# Patient Record
Sex: MALE | Race: BLACK OR AFRICAN AMERICAN | Employment: OTHER
[De-identification: names, ages, dates, MRNs, and addresses within clinical notes are randomized per-mention and may not be internally consistent; named-entity substitution may affect disease eponyms.]

---

## 2022-03-18 PROBLEM — R42 POSTURAL DIZZINESS WITH PRESYNCOPE: Status: ACTIVE | Noted: 2020-01-04

## 2022-03-18 PROBLEM — E78.5 DYSLIPIDEMIA: Status: ACTIVE | Noted: 2018-09-12

## 2022-03-18 PROBLEM — N17.9 AKI (ACUTE KIDNEY INJURY) (HCC): Status: ACTIVE | Noted: 2020-01-04

## 2022-03-18 PROBLEM — I95.1 ORTHOSTATIC HYPOTENSION: Status: ACTIVE | Noted: 2020-01-04

## 2022-03-18 PROBLEM — Z71.89 ACP (ADVANCE CARE PLANNING): Status: ACTIVE | Noted: 2018-09-27

## 2022-03-18 PROBLEM — R55 POSTURAL DIZZINESS WITH PRESYNCOPE: Status: ACTIVE | Noted: 2020-01-04

## 2022-03-18 PROBLEM — F39 MOOD DISORDER (HCC): Status: ACTIVE | Noted: 2018-10-29

## 2022-03-19 PROBLEM — I50.32 CHF (CONGESTIVE HEART FAILURE), NYHA CLASS III, CHRONIC, DIASTOLIC (HCC): Status: ACTIVE | Noted: 2020-01-29

## 2022-03-19 PROBLEM — I25.10 CORONARY ARTERY DISEASE INVOLVING NATIVE HEART: Status: ACTIVE | Noted: 2018-09-12

## 2022-03-19 PROBLEM — J44.9 CHRONIC OBSTRUCTIVE PULMONARY DISEASE (HCC): Status: ACTIVE | Noted: 2018-09-12

## 2022-03-19 PROBLEM — I48.92 ATRIAL FLUTTER (HCC): Status: ACTIVE | Noted: 2018-10-04

## 2022-03-19 PROBLEM — R06.09 DYSPNEA ON EXERTION: Status: ACTIVE | Noted: 2020-01-10

## 2022-03-19 PROBLEM — I25.708 CORONARY ARTERY DISEASE OF BYPASS GRAFT WITH STABLE ANGINA PECTORIS (HCC): Status: ACTIVE | Noted: 2018-10-04

## 2022-03-19 PROBLEM — E11.21 TYPE 2 DIABETES WITH NEPHROPATHY (HCC): Status: ACTIVE | Noted: 2018-10-29

## 2022-03-19 PROBLEM — I50.9 CONGESTIVE HEART FAILURE (HCC): Status: ACTIVE | Noted: 2020-03-21

## 2022-03-19 PROBLEM — E11.65 TYPE 2 DIABETES MELLITUS WITH HYPERGLYCEMIA, WITHOUT LONG-TERM CURRENT USE OF INSULIN (HCC): Status: ACTIVE | Noted: 2018-09-12

## 2022-03-20 PROBLEM — I20.89 STABLE ANGINA: Status: ACTIVE | Noted: 2020-01-10

## 2022-03-20 PROBLEM — I10 ESSENTIAL HYPERTENSION: Status: ACTIVE | Noted: 2018-09-12

## 2022-03-20 PROBLEM — I50.32 CHRONIC DIASTOLIC CONGESTIVE HEART FAILURE (HCC): Status: ACTIVE | Noted: 2018-10-04

## 2022-03-20 PROBLEM — Z95.1 S/P CABG X 4: Status: ACTIVE | Noted: 2018-09-12

## 2023-01-23 PROBLEM — I77.819 AORTIC DILATATION (HCC): Status: ACTIVE | Noted: 2023-01-23

## 2023-01-23 PROBLEM — N18.30 CHRONIC RENAL DISEASE, STAGE III (HCC): Status: ACTIVE | Noted: 2023-01-23

## 2023-06-10 ENCOUNTER — APPOINTMENT (OUTPATIENT)
Facility: HOSPITAL | Age: 83
End: 2023-06-10
Payer: MEDICARE

## 2023-06-10 ENCOUNTER — HOSPITAL ENCOUNTER (EMERGENCY)
Facility: HOSPITAL | Age: 83
Discharge: HOME | End: 2023-06-10
Attending: EMERGENCY MEDICINE
Payer: MEDICARE

## 2023-06-10 VITALS
TEMPERATURE: 99 F | HEIGHT: 70 IN | BODY MASS INDEX: 35.79 KG/M2 | HEART RATE: 76 BPM | WEIGHT: 250 LBS | OXYGEN SATURATION: 93 % | DIASTOLIC BLOOD PRESSURE: 61 MMHG | RESPIRATION RATE: 18 BRPM | SYSTOLIC BLOOD PRESSURE: 118 MMHG

## 2023-06-10 DIAGNOSIS — M71.22 SYNOVIAL CYST OF LEFT POPLITEAL SPACE: ICD-10-CM

## 2023-06-10 DIAGNOSIS — M25.562 ACUTE PAIN OF LEFT KNEE: Primary | ICD-10-CM

## 2023-06-10 PROCEDURE — 73562 X-RAY EXAM OF KNEE 3: CPT

## 2023-06-10 PROCEDURE — 73610 X-RAY EXAM OF ANKLE: CPT

## 2023-06-10 PROCEDURE — 6360000002 HC RX W HCPCS: Performed by: EMERGENCY MEDICINE

## 2023-06-10 PROCEDURE — 73502 X-RAY EXAM HIP UNI 2-3 VIEWS: CPT

## 2023-06-10 PROCEDURE — 73700 CT LOWER EXTREMITY W/O DYE: CPT

## 2023-06-10 PROCEDURE — 6370000000 HC RX 637 (ALT 250 FOR IP): Performed by: EMERGENCY MEDICINE

## 2023-06-10 RX ORDER — KETOROLAC TROMETHAMINE 15 MG/ML
30 INJECTION, SOLUTION INTRAMUSCULAR; INTRAVENOUS
Status: COMPLETED | OUTPATIENT
Start: 2023-06-10 | End: 2023-06-10

## 2023-06-10 RX ORDER — HYDROCODONE BITARTRATE AND ACETAMINOPHEN 5; 325 MG/1; MG/1
1 TABLET ORAL
Status: COMPLETED | OUTPATIENT
Start: 2023-06-10 | End: 2023-06-10

## 2023-06-10 RX ADMIN — KETOROLAC TROMETHAMINE 30 MG: 15 INJECTION, SOLUTION INTRAMUSCULAR; INTRAVENOUS at 10:57

## 2023-06-10 RX ADMIN — HYDROCODONE BITARTRATE AND ACETAMINOPHEN 1 TABLET: 5; 325 TABLET ORAL at 13:29

## 2023-06-10 ASSESSMENT — PAIN SCALES - GENERAL: PAINLEVEL_OUTOF10: 5

## 2023-06-10 ASSESSMENT — LIFESTYLE VARIABLES
HOW OFTEN DO YOU HAVE A DRINK CONTAINING ALCOHOL: MONTHLY OR LESS
HOW MANY STANDARD DRINKS CONTAINING ALCOHOL DO YOU HAVE ON A TYPICAL DAY: 1 OR 2

## 2023-06-10 ASSESSMENT — PAIN - FUNCTIONAL ASSESSMENT: PAIN_FUNCTIONAL_ASSESSMENT: 0-10

## 2023-06-10 NOTE — ED NOTES
CT left knee completed. Pt sitting on edge of bed.  Waiting for results     Anna Vences RN  06/10/23 1400

## 2023-06-10 NOTE — ED NOTES
Attempted to ambulate the pt.  Pt stood and stated he could not take a step or bear weight on his left leg due to knee pain     Carli Yousif RN  06/10/23 8829

## 2023-06-10 NOTE — ED PROVIDER NOTES
sulfate HFA (PROVENTIL;VENTOLIN;PROAIR) 108 (90 Base) MCG/ACT inhaler Inhale 1 puff into the lungs every 4 hours as needed      ammonium lactate (LAC-HYDRIN) 12 % lotion Apply 12 Bottles topically 2 times daily as needed      apixaban (ELIQUIS) 5 MG TABS tablet Take 0.5 tablets by mouth 2 times daily      aspirin 81 MG EC tablet Take 1 tablet by mouth daily      vitamin D (CHOLECALCIFEROL) 25 MCG (1000 UT) TABS tablet Take 1 tablet by mouth 2 times daily      docusate (COLACE, DULCOLAX) 100 MG CAPS Take 100 mg by mouth daily      ferrous sulfate (FE TABS 325) 325 (65 Fe) MG EC tablet Take 1 tablet by mouth daily      fluticasone-umeclidin-vilant (TRELEGY ELLIPTA) 200-62.5-25 MCG/ACT AEPB inhaler Inhale 1 puff into the lungs daily      furosemide (LASIX) 40 MG tablet Take 1 tablet by mouth daily      metOLazone (ZAROXOLYN) 5 MG tablet Take 1 tablet by mouth daily      nitroGLYCERIN (NITROSTAT) 0.3 MG SL tablet Place 1 tablet under the tongue      pantoprazole (PROTONIX) 40 MG tablet Take 1 tablet by mouth daily      potassium chloride (KLOR-CON M) 20 MEQ extended release tablet Take 1 tablet by mouth 2 times daily      pravastatin (PRAVACHOL) 20 MG tablet Take 1 tablet by mouth      sertraline (ZOLOFT) 100 MG tablet Take 1.5 tablets by mouth daily      Magnesium 500 MG TABS Take 500 mg by mouth daily 90 tablet 1       ALLERGIES:  Allergies   Allergen Reactions    Penicillin G Swelling       SOCIAL DETERMINANTS OF HEALTH:  Social Determinants of Health     Tobacco Use: Low Risk     Smoking Tobacco Use: Never    Smokeless Tobacco Use: Never    Passive Exposure: Not on file   Alcohol Use: Not on file   Financial Resource Strain: Low Risk     Difficulty of Paying Living Expenses: Not very hard   Food Insecurity: No Food Insecurity    Worried About Running Out of Food in the Last Year: Never true    Ran Out of Food in the Last Year: Never true   Transportation Needs: Unknown    Lack of Transportation (Medical):  Not on

## 2023-06-10 NOTE — ED NOTES
Patient poor historian, wife not at bedside.    Triage- home medications pending per her arrival.      Jazmyn Sales RN  06/10/23 9902

## 2023-06-26 ENCOUNTER — OFFICE VISIT (OUTPATIENT)
Age: 83
End: 2023-06-26

## 2023-06-26 ENCOUNTER — TELEPHONE (OUTPATIENT)
Dept: SLEEP MEDICINE | Facility: HOSPITAL | Age: 83
End: 2023-06-26

## 2023-06-26 VITALS
WEIGHT: 235.2 LBS | HEART RATE: 68 BPM | DIASTOLIC BLOOD PRESSURE: 71 MMHG | TEMPERATURE: 98.2 F | OXYGEN SATURATION: 94 % | HEIGHT: 70 IN | RESPIRATION RATE: 18 BRPM | BODY MASS INDEX: 33.67 KG/M2 | SYSTOLIC BLOOD PRESSURE: 140 MMHG

## 2023-06-26 DIAGNOSIS — G47.33 OSA AND COPD OVERLAP SYNDROME (HCC): Primary | ICD-10-CM

## 2023-06-26 DIAGNOSIS — J44.9 OSA AND COPD OVERLAP SYNDROME (HCC): Primary | ICD-10-CM

## 2023-06-26 DIAGNOSIS — E66.9 OBESITY (BMI 30-39.9): ICD-10-CM

## 2023-06-26 DIAGNOSIS — I50.32 CHRONIC DIASTOLIC CONGESTIVE HEART FAILURE (HCC): ICD-10-CM

## 2023-06-26 DIAGNOSIS — I10 HYPERTENSION, UNSPECIFIED TYPE: ICD-10-CM

## 2023-06-26 DIAGNOSIS — I27.20 PULMONARY HYPERTENSION, UNSPECIFIED (HCC): ICD-10-CM

## 2023-06-26 ASSESSMENT — SLEEP AND FATIGUE QUESTIONNAIRES
HOW LIKELY ARE YOU TO NOD OFF OR FALL ASLEEP WHILE SITTING INACTIVE IN A PUBLIC PLACE: 2
ESS TOTAL SCORE: 16
HOW LIKELY ARE YOU TO NOD OFF OR FALL ASLEEP WHILE WATCHING TV: 3
HOW LIKELY ARE YOU TO NOD OFF OR FALL ASLEEP WHEN YOU ARE A PASSENGER IN A CAR FOR AN HOUR WITHOUT A BREAK: 2
HOW LIKELY ARE YOU TO NOD OFF OR FALL ASLEEP WHILE SITTING AND READING: 1
HOW LIKELY ARE YOU TO NOD OFF OR FALL ASLEEP IN A CAR, WHILE STOPPED FOR A FEW MINUTES IN TRAFFIC: 0
HOW LIKELY ARE YOU TO NOD OFF OR FALL ASLEEP WHILE LYING DOWN TO REST IN THE AFTERNOON WHEN CIRCUMSTANCES PERMIT: 3
NECK CIRCUMFERENCE (INCHES): 16
HOW LIKELY ARE YOU TO NOD OFF OR FALL ASLEEP WHILE SITTING AND TALKING TO SOMEONE: 2
HOW LIKELY ARE YOU TO NOD OFF OR FALL ASLEEP WHILE SITTING QUIETLY AFTER LUNCH WITHOUT ALCOHOL: 3

## 2023-06-26 ASSESSMENT — PATIENT HEALTH QUESTIONNAIRE - PHQ9
SUM OF ALL RESPONSES TO PHQ QUESTIONS 1-9: 0
SUM OF ALL RESPONSES TO PHQ9 QUESTIONS 1 & 2: 0
2. FEELING DOWN, DEPRESSED OR HOPELESS: 0
SUM OF ALL RESPONSES TO PHQ QUESTIONS 1-9: 0
1. LITTLE INTEREST OR PLEASURE IN DOING THINGS: 0
SUM OF ALL RESPONSES TO PHQ QUESTIONS 1-9: 0
SUM OF ALL RESPONSES TO PHQ QUESTIONS 1-9: 0

## 2023-06-27 ENCOUNTER — OFFICE VISIT (OUTPATIENT)
Facility: CLINIC | Age: 83
End: 2023-06-27
Payer: MEDICARE

## 2023-06-27 VITALS
SYSTOLIC BLOOD PRESSURE: 171 MMHG | HEART RATE: 94 BPM | DIASTOLIC BLOOD PRESSURE: 97 MMHG | WEIGHT: 234 LBS | TEMPERATURE: 97.8 F | BODY MASS INDEX: 34.66 KG/M2 | RESPIRATION RATE: 20 BRPM | HEIGHT: 69 IN | OXYGEN SATURATION: 91 %

## 2023-06-27 DIAGNOSIS — I25.708 ATHEROSCLEROSIS OF CORONARY ARTERY BYPASS GRAFT(S), UNSPECIFIED, WITH OTHER FORMS OF ANGINA PECTORIS (HCC): ICD-10-CM

## 2023-06-27 DIAGNOSIS — E78.5 HYPERLIPIDEMIA, UNSPECIFIED HYPERLIPIDEMIA TYPE: ICD-10-CM

## 2023-06-27 DIAGNOSIS — R26.89 POOR BALANCE: ICD-10-CM

## 2023-06-27 DIAGNOSIS — Z74.09 MOBILITY IMPAIRED: ICD-10-CM

## 2023-06-27 DIAGNOSIS — I50.32 CHRONIC DIASTOLIC (CONGESTIVE) HEART FAILURE (HCC): Primary | ICD-10-CM

## 2023-06-27 DIAGNOSIS — I10 ESSENTIAL (PRIMARY) HYPERTENSION: ICD-10-CM

## 2023-06-27 DIAGNOSIS — K59.00 CONSTIPATION, UNSPECIFIED CONSTIPATION TYPE: ICD-10-CM

## 2023-06-27 DIAGNOSIS — N18.30 STAGE 3 CHRONIC KIDNEY DISEASE, UNSPECIFIED WHETHER STAGE 3A OR 3B CKD (HCC): ICD-10-CM

## 2023-06-27 DIAGNOSIS — J44.9 CHRONIC OBSTRUCTIVE PULMONARY DISEASE, UNSPECIFIED COPD TYPE (HCC): ICD-10-CM

## 2023-06-27 DIAGNOSIS — E87.6 HYPOKALEMIA: ICD-10-CM

## 2023-06-27 DIAGNOSIS — Z91.81 AT HIGH RISK FOR FALLS: ICD-10-CM

## 2023-06-27 PROBLEM — M54.50 LOW BACK PAIN: Status: ACTIVE | Noted: 2023-06-27

## 2023-06-27 PROBLEM — H93.19 TINNITUS: Status: ACTIVE | Noted: 2023-06-27

## 2023-06-27 PROBLEM — J98.4 RESTRICTIVE LUNG DISEASE: Status: ACTIVE | Noted: 2023-06-27

## 2023-06-27 PROBLEM — K57.30 DIVERTICULAR DISEASE OF COLON: Status: ACTIVE | Noted: 2023-06-27

## 2023-06-27 PROBLEM — K70.0 ALCOHOLIC FATTY LIVER: Status: ACTIVE | Noted: 2023-06-27

## 2023-06-27 PROBLEM — D13.1 BENIGN NEOPLASM OF STOMACH: Status: ACTIVE | Noted: 2023-06-27

## 2023-06-27 PROBLEM — N18.2 BENIGN HYPERTENSIVE HEART AND KIDNEY DISEASE WITH CHRONIC KIDNEY DISEASE, STAGE II: Status: ACTIVE | Noted: 2023-06-27

## 2023-06-27 PROBLEM — D12.6 BENIGN NEOPLASM OF COLON: Status: ACTIVE | Noted: 2023-06-27

## 2023-06-27 PROBLEM — F43.10 POSTTRAUMATIC STRESS DISORDER: Status: ACTIVE | Noted: 2023-06-27

## 2023-06-27 PROBLEM — I25.2 OLD MYOCARDIAL INFARCTION: Status: ACTIVE | Noted: 2023-06-27

## 2023-06-27 PROBLEM — H90.3 ASYMMETRICAL SENSORINEURAL HEARING LOSS: Status: ACTIVE | Noted: 2023-06-27

## 2023-06-27 PROBLEM — E78.00 HYPERCHOLESTEROLEMIA: Status: ACTIVE | Noted: 2023-06-27

## 2023-06-27 PROBLEM — H25.019 CORTICAL SENILE CATARACT: Status: ACTIVE | Noted: 2023-06-27

## 2023-06-27 PROBLEM — H11.30 CONJUNCTIVAL HEMORRHAGE: Status: ACTIVE | Noted: 2023-06-27

## 2023-06-27 PROBLEM — I67.9 CEREBROVASCULAR DISEASE: Status: ACTIVE | Noted: 2023-06-27

## 2023-06-27 PROBLEM — E66.01 MORBID OBESITY (HCC): Status: ACTIVE | Noted: 2020-01-04

## 2023-06-27 PROBLEM — G47.33 OBSTRUCTIVE SLEEP APNEA SYNDROME: Status: ACTIVE | Noted: 2023-06-27

## 2023-06-27 PROBLEM — I13.10 BENIGN HYPERTENSIVE HEART AND KIDNEY DISEASE WITH CHRONIC KIDNEY DISEASE, STAGE II: Status: ACTIVE | Noted: 2023-06-27

## 2023-06-27 PROBLEM — B35.1 ONYCHOMYCOSIS: Status: ACTIVE | Noted: 2023-06-27

## 2023-06-27 PROBLEM — H04.129 TEAR FILM INSUFFICIENCY: Status: ACTIVE | Noted: 2023-06-27

## 2023-06-27 PROBLEM — R69 OTHER ILL-DEFINED AND UNKNOWN CAUSES OF MORBIDITY AND MORTALITY: Status: ACTIVE | Noted: 2023-06-27

## 2023-06-27 PROBLEM — K21.9 GASTROESOPHAGEAL REFLUX DISEASE: Status: ACTIVE | Noted: 2023-06-27

## 2023-06-27 PROBLEM — H52.4 PRESBYOPIA: Status: ACTIVE | Noted: 2023-06-27

## 2023-06-27 PROCEDURE — 3023F SPIROM DOC REV: CPT | Performed by: FAMILY MEDICINE

## 2023-06-27 PROCEDURE — 3074F SYST BP LT 130 MM HG: CPT | Performed by: FAMILY MEDICINE

## 2023-06-27 PROCEDURE — G8417 CALC BMI ABV UP PARAM F/U: HCPCS | Performed by: FAMILY MEDICINE

## 2023-06-27 PROCEDURE — G8427 DOCREV CUR MEDS BY ELIG CLIN: HCPCS | Performed by: FAMILY MEDICINE

## 2023-06-27 PROCEDURE — 1123F ACP DISCUSS/DSCN MKR DOCD: CPT | Performed by: FAMILY MEDICINE

## 2023-06-27 PROCEDURE — 3078F DIAST BP <80 MM HG: CPT | Performed by: FAMILY MEDICINE

## 2023-06-27 PROCEDURE — 1036F TOBACCO NON-USER: CPT | Performed by: FAMILY MEDICINE

## 2023-06-27 PROCEDURE — 99215 OFFICE O/P EST HI 40 MIN: CPT | Performed by: FAMILY MEDICINE

## 2023-06-27 RX ORDER — PRAVASTATIN SODIUM 20 MG
20 TABLET ORAL DAILY
Qty: 90 TABLET | Refills: 1 | Status: SHIPPED | OUTPATIENT
Start: 2023-06-27

## 2023-06-27 RX ORDER — POTASSIUM CHLORIDE 20 MEQ/1
20 TABLET, EXTENDED RELEASE ORAL 2 TIMES DAILY
Qty: 180 TABLET | Refills: 1 | Status: SHIPPED | OUTPATIENT
Start: 2023-06-27

## 2023-06-27 RX ORDER — PSEUDOEPHEDRINE HCL 30 MG
100 TABLET ORAL DAILY PRN
Qty: 90 CAPSULE | Refills: 1 | Status: SHIPPED | OUTPATIENT
Start: 2023-06-27

## 2023-06-27 SDOH — ECONOMIC STABILITY: INCOME INSECURITY: HOW HARD IS IT FOR YOU TO PAY FOR THE VERY BASICS LIKE FOOD, HOUSING, MEDICAL CARE, AND HEATING?: NOT VERY HARD

## 2023-06-27 SDOH — ECONOMIC STABILITY: HOUSING INSECURITY
IN THE LAST 12 MONTHS, WAS THERE A TIME WHEN YOU DID NOT HAVE A STEADY PLACE TO SLEEP OR SLEPT IN A SHELTER (INCLUDING NOW)?: NO

## 2023-06-27 SDOH — ECONOMIC STABILITY: FOOD INSECURITY: WITHIN THE PAST 12 MONTHS, YOU WORRIED THAT YOUR FOOD WOULD RUN OUT BEFORE YOU GOT MONEY TO BUY MORE.: NEVER TRUE

## 2023-06-27 SDOH — ECONOMIC STABILITY: FOOD INSECURITY: WITHIN THE PAST 12 MONTHS, THE FOOD YOU BOUGHT JUST DIDN'T LAST AND YOU DIDN'T HAVE MONEY TO GET MORE.: NEVER TRUE

## 2023-06-27 ASSESSMENT — PATIENT HEALTH QUESTIONNAIRE - PHQ9
SUM OF ALL RESPONSES TO PHQ QUESTIONS 1-9: 0
SUM OF ALL RESPONSES TO PHQ QUESTIONS 1-9: 0
1. LITTLE INTEREST OR PLEASURE IN DOING THINGS: 0
2. FEELING DOWN, DEPRESSED OR HOPELESS: 0
SUM OF ALL RESPONSES TO PHQ QUESTIONS 1-9: 0
SUM OF ALL RESPONSES TO PHQ QUESTIONS 1-9: 0
SUM OF ALL RESPONSES TO PHQ9 QUESTIONS 1 & 2: 0

## 2023-06-30 RX ORDER — AMLODIPINE BESYLATE 10 MG/1
10 TABLET ORAL DAILY
Qty: 30 TABLET | Refills: 5 | Status: SHIPPED | OUTPATIENT
Start: 2023-06-30

## 2023-07-05 ENCOUNTER — TELEPHONE (OUTPATIENT)
Facility: CLINIC | Age: 83
End: 2023-07-05

## 2023-07-05 NOTE — TELEPHONE ENCOUNTER
----- Message from Patrizia Kern MD sent at 6/30/2023  2:56 PM EDT -----  Please let patient know his blood pressure is elevated. I will send in amlodipine to take 10 mg daily. I will follow-up at next visit.   Patrizia Kern MD      Message left on voicemail

## 2023-07-10 ENCOUNTER — TELEPHONE (OUTPATIENT)
Age: 83
End: 2023-07-10

## 2023-07-10 ENCOUNTER — OFFICE VISIT (OUTPATIENT)
Age: 83
End: 2023-07-10
Payer: MEDICARE

## 2023-07-10 VITALS
DIASTOLIC BLOOD PRESSURE: 80 MMHG | RESPIRATION RATE: 18 BRPM | BODY MASS INDEX: 35.43 KG/M2 | HEIGHT: 69 IN | OXYGEN SATURATION: 93 % | WEIGHT: 239.2 LBS | SYSTOLIC BLOOD PRESSURE: 133 MMHG | TEMPERATURE: 97.9 F | HEART RATE: 94 BPM

## 2023-07-10 DIAGNOSIS — I27.20 PULMONARY HYPERTENSION, UNSPECIFIED (HCC): ICD-10-CM

## 2023-07-10 DIAGNOSIS — R53.81 PHYSICAL DECONDITIONING: ICD-10-CM

## 2023-07-10 DIAGNOSIS — B37.0 THRUSH: ICD-10-CM

## 2023-07-10 DIAGNOSIS — J43.2 CENTRILOBULAR EMPHYSEMA (HCC): Primary | ICD-10-CM

## 2023-07-10 DIAGNOSIS — M79.89 LEG SWELLING: ICD-10-CM

## 2023-07-10 PROCEDURE — 3074F SYST BP LT 130 MM HG: CPT | Performed by: INTERNAL MEDICINE

## 2023-07-10 PROCEDURE — G8417 CALC BMI ABV UP PARAM F/U: HCPCS | Performed by: INTERNAL MEDICINE

## 2023-07-10 PROCEDURE — 99213 OFFICE O/P EST LOW 20 MIN: CPT | Performed by: INTERNAL MEDICINE

## 2023-07-10 PROCEDURE — 3023F SPIROM DOC REV: CPT | Performed by: INTERNAL MEDICINE

## 2023-07-10 PROCEDURE — 1036F TOBACCO NON-USER: CPT | Performed by: INTERNAL MEDICINE

## 2023-07-10 PROCEDURE — G8428 CUR MEDS NOT DOCUMENT: HCPCS | Performed by: INTERNAL MEDICINE

## 2023-07-10 PROCEDURE — 3078F DIAST BP <80 MM HG: CPT | Performed by: INTERNAL MEDICINE

## 2023-07-10 PROCEDURE — 1123F ACP DISCUSS/DSCN MKR DOCD: CPT | Performed by: INTERNAL MEDICINE

## 2023-07-10 RX ORDER — FLUTICASONE FUROATE, UMECLIDINIUM BROMIDE AND VILANTEROL TRIFENATATE 200; 62.5; 25 UG/1; UG/1; UG/1
1 POWDER RESPIRATORY (INHALATION) DAILY
Qty: 3 EACH | Refills: 3 | Status: SHIPPED | OUTPATIENT
Start: 2023-07-10

## 2023-07-10 RX ORDER — ALBUTEROL SULFATE 2.5 MG/3ML
2.5 SOLUTION RESPIRATORY (INHALATION) EVERY 4 HOURS PRN
Qty: 120 EACH | Refills: 5 | Status: SHIPPED | OUTPATIENT
Start: 2023-07-10

## 2023-07-10 RX ORDER — ALBUTEROL SULFATE 90 UG/1
1 AEROSOL, METERED RESPIRATORY (INHALATION) EVERY 4 HOURS PRN
Qty: 18 G | Refills: 5 | Status: SHIPPED | OUTPATIENT
Start: 2023-07-10

## 2023-07-10 NOTE — PATIENT INSTRUCTIONS
What is the plan?  -Continue Trelegy and Albuterol rescue inhaler as prescribed  -Rinse mouth after each use of your Trelegy inhaler    -Start on Nystatin as prescribed  -Start physical therapy    -Complete ultrasound of your legs    -Follow-up your Cardiologist as well.

## 2023-07-10 NOTE — PROGRESS NOTES
Kartik Emerson presents today for   Chief Complaint   Patient presents with    COPD       Is someone accompanying this pt? Wife     Is the patient using any DME equipment during OV? Cpap and Wheelchair     -DME Company NA    Depression Screening:    PHQ-9 Questionaire 6/27/2023   Little interest or pleasure in doing things 0   Feeling down, depressed, or hopeless 0   Trouble falling or staying asleep, or sleeping too much -   Feeling tired or having little energy -   Poor appetite or overeating -   Feeling bad about yourself - or that you are a failure or have let yourself or your family down -   Trouble concentrating on things, such as reading the newspaper or watching television -   Moving or speaking so slowly that other people could have noticed. Or the opposite - being so fidgety or restless that you have been moving around a lot more than usual -   PHQ-9 Total Score 0       Learning Assessment:    No flowsheet data found. Abuse Screening:    No flowsheet data found. Fall Risk    No flowsheet data found. Coordination of Care:    1. Have you been to the ER, urgent care clinic since your last visit? Hospitalized since your last visit? No    2. Have you seen or consulted any other health care providers outside of the [address] [place] [address] since your last visit? Include any pap smears or colon screening. No    Medication list has been update per patient.
Low back pain    Morbid obesity (HCC)    Cerebrovascular disease    Obstructive sleep apnea syndrome    Old myocardial infarction    Onychomycosis    Other ill-defined and unknown causes of morbidity and mortality    Posttraumatic stress disorder    Presbyopia    Restrictive lung disease    Tear film insufficiency    Secondary pulmonary hypertension    Tinnitus       IMPRESSION:   Obstructive lung disease: Never smoker, etiology of his obstructive lung disease unknown at this time. ?Occupational exposures leading to presentation. Suspect symptoms also likely secondary to HFpEF/hypervolemia with pulmonary hypertension. Prior lab work negative for anemia. SpO2 93% on room air in clinic today. PFT w/ moderate airflow obstruction with BD reactivity along with reduced DLCO. SMW without hypoxia but ambulation severely limited by knee pain. Pulmonary hypertension: PASP noted at 64 mmHg on most recent TTE. Likely group 2 & 3 disease. No RHC available for review. JUAN on CPAP: following with sleep medicine at the [facility]. Has had machine for ~8 years. Defer management to Sleep medicine - seeing. Dr. Isreal Ruggiero. Leg swelling: noted interval worsening of LE edema; asymmetric and recent fall. Oropharyngeal thrush  Comorbid conditions: HFrEF(RV dysfunction) and diastolic HF, Afib (on Eliquis). RECOMMENDATIONS:   Obtain LE U/S r/o DVT. Continue Trelegy ellipta 200/62.5/25 mcg inhaler 1 puff daily as it continues to provide significant relief; patient instructed to rinse mouth after each use  Continue albuterol rescue inhaler 1 to 2 puffs every 4-6 hours as needed for shortness of breath  Start on Nystatin oral suspension  Continue albuterol nebulizer every 4-6 hours for shortness of breath  Recommend continued follow-up with Cardiology along with strict adherence to diuretic regimen  Pulmonary rehab - referral provided; patient declined. Amenable to home PT - order placed.   Vaccination: recommend all age-appropriate

## 2023-07-13 ENCOUNTER — TELEPHONE (OUTPATIENT)
Age: 83
End: 2023-07-13

## 2023-07-13 NOTE — TELEPHONE ENCOUNTER
Sent order to [facility] to fax [phone]

## 2023-07-14 DIAGNOSIS — M79.89 LEG SWELLING: Primary | ICD-10-CM

## 2023-07-14 DIAGNOSIS — R53.81 PHYSICAL DECONDITIONING: ICD-10-CM

## 2023-07-17 ENCOUNTER — TELEPHONE (OUTPATIENT)
Age: 83
End: 2023-07-17

## 2023-07-17 NOTE — TELEPHONE ENCOUNTER
Sent Physical Theraphy order to  [employer] at fax [phone], telephone# [phone] [address] [place] [address]

## 2023-07-19 ENCOUNTER — HOSPITAL ENCOUNTER (OUTPATIENT)
Facility: HOSPITAL | Age: 83
Discharge: HOME | End: 2023-07-21
Attending: INTERNAL MEDICINE
Payer: MEDICARE

## 2023-07-19 DIAGNOSIS — M79.89 LEG SWELLING: ICD-10-CM

## 2023-07-19 PROCEDURE — 93970 EXTREMITY STUDY: CPT | Performed by: INTERNAL MEDICINE

## 2023-07-19 PROCEDURE — 93970 EXTREMITY STUDY: CPT

## 2023-07-24 ENCOUNTER — CLINICAL DOCUMENTATION (OUTPATIENT)
Facility: CLINIC | Age: 83
End: 2023-07-24

## 2023-07-24 NOTE — PROGRESS NOTES
Last office notes, face sheet and orders for motorized scooter and shower grab bars faxed to [facility]

## 2023-08-08 ENCOUNTER — OFFICE VISIT (OUTPATIENT)
Facility: CLINIC | Age: 83
End: 2023-08-08
Payer: MEDICARE

## 2023-08-08 VITALS
TEMPERATURE: 97.7 F | HEIGHT: 69 IN | OXYGEN SATURATION: 95 % | BODY MASS INDEX: 35.4 KG/M2 | WEIGHT: 239 LBS | DIASTOLIC BLOOD PRESSURE: 79 MMHG | RESPIRATION RATE: 16 BRPM | SYSTOLIC BLOOD PRESSURE: 137 MMHG | HEART RATE: 100 BPM

## 2023-08-08 DIAGNOSIS — Z74.09 IMPAIRED MOBILITY: ICD-10-CM

## 2023-08-08 DIAGNOSIS — M19.90 ARTHRITIS: ICD-10-CM

## 2023-08-08 DIAGNOSIS — Z74.09 MOBILITY IMPAIRED: ICD-10-CM

## 2023-08-08 DIAGNOSIS — N18.30 STAGE 3 CHRONIC KIDNEY DISEASE, UNSPECIFIED WHETHER STAGE 3A OR 3B CKD (HCC): ICD-10-CM

## 2023-08-08 DIAGNOSIS — E78.5 HYPERLIPIDEMIA, UNSPECIFIED HYPERLIPIDEMIA TYPE: ICD-10-CM

## 2023-08-08 DIAGNOSIS — J44.9 CHRONIC OBSTRUCTIVE PULMONARY DISEASE, UNSPECIFIED COPD TYPE (HCC): ICD-10-CM

## 2023-08-08 DIAGNOSIS — I50.32 CHRONIC DIASTOLIC (CONGESTIVE) HEART FAILURE (HCC): Primary | ICD-10-CM

## 2023-08-08 DIAGNOSIS — K21.9 GASTRO-ESOPHAGEAL REFLUX DISEASE WITHOUT ESOPHAGITIS: ICD-10-CM

## 2023-08-08 DIAGNOSIS — E66.9 OBESITY (BMI 30-39.9): ICD-10-CM

## 2023-08-08 DIAGNOSIS — R26.89 POOR BALANCE: ICD-10-CM

## 2023-08-08 DIAGNOSIS — I25.708 ATHEROSCLEROSIS OF CORONARY ARTERY BYPASS GRAFT(S), UNSPECIFIED, WITH OTHER FORMS OF ANGINA PECTORIS (HCC): ICD-10-CM

## 2023-08-08 DIAGNOSIS — I10 ESSENTIAL (PRIMARY) HYPERTENSION: ICD-10-CM

## 2023-08-08 DIAGNOSIS — Z91.81 AT HIGH RISK FOR FALLS: ICD-10-CM

## 2023-08-08 PROCEDURE — G8417 CALC BMI ABV UP PARAM F/U: HCPCS | Performed by: FAMILY MEDICINE

## 2023-08-08 PROCEDURE — 99215 OFFICE O/P EST HI 40 MIN: CPT | Performed by: FAMILY MEDICINE

## 2023-08-08 PROCEDURE — G8427 DOCREV CUR MEDS BY ELIG CLIN: HCPCS | Performed by: FAMILY MEDICINE

## 2023-08-08 PROCEDURE — 1036F TOBACCO NON-USER: CPT | Performed by: FAMILY MEDICINE

## 2023-08-08 PROCEDURE — 1123F ACP DISCUSS/DSCN MKR DOCD: CPT | Performed by: FAMILY MEDICINE

## 2023-08-08 PROCEDURE — 3023F SPIROM DOC REV: CPT | Performed by: FAMILY MEDICINE

## 2023-08-08 PROCEDURE — 3074F SYST BP LT 130 MM HG: CPT | Performed by: FAMILY MEDICINE

## 2023-08-08 PROCEDURE — 3078F DIAST BP <80 MM HG: CPT | Performed by: FAMILY MEDICINE

## 2023-08-08 RX ORDER — METOLAZONE 5 MG/1
5 TABLET ORAL DAILY
Qty: 30 TABLET | Refills: 1 | Status: SHIPPED | OUTPATIENT
Start: 2023-08-08

## 2023-08-08 SDOH — ECONOMIC STABILITY: FOOD INSECURITY: WITHIN THE PAST 12 MONTHS, YOU WORRIED THAT YOUR FOOD WOULD RUN OUT BEFORE YOU GOT MONEY TO BUY MORE.: NEVER TRUE

## 2023-08-08 SDOH — ECONOMIC STABILITY: INCOME INSECURITY: HOW HARD IS IT FOR YOU TO PAY FOR THE VERY BASICS LIKE FOOD, HOUSING, MEDICAL CARE, AND HEATING?: NOT HARD AT ALL

## 2023-08-08 SDOH — ECONOMIC STABILITY: FOOD INSECURITY: WITHIN THE PAST 12 MONTHS, THE FOOD YOU BOUGHT JUST DIDN'T LAST AND YOU DIDN'T HAVE MONEY TO GET MORE.: NEVER TRUE

## 2023-08-08 ASSESSMENT — PATIENT HEALTH QUESTIONNAIRE - PHQ9
SUM OF ALL RESPONSES TO PHQ QUESTIONS 1-9: 0
1. LITTLE INTEREST OR PLEASURE IN DOING THINGS: 0
SUM OF ALL RESPONSES TO PHQ QUESTIONS 1-9: 0
SUM OF ALL RESPONSES TO PHQ QUESTIONS 1-9: 0
2. FEELING DOWN, DEPRESSED OR HOPELESS: 0
SUM OF ALL RESPONSES TO PHQ9 QUESTIONS 1 & 2: 0
SUM OF ALL RESPONSES TO PHQ QUESTIONS 1-9: 0

## 2023-08-14 ENCOUNTER — HOSPITAL ENCOUNTER (EMERGENCY)
Facility: HOSPITAL | Age: 83
Discharge: HOME | End: 2023-08-14
Attending: STUDENT IN AN ORGANIZED HEALTH CARE EDUCATION/TRAINING PROGRAM
Payer: MEDICARE

## 2023-08-14 ENCOUNTER — APPOINTMENT (OUTPATIENT)
Facility: HOSPITAL | Age: 83
End: 2023-08-14
Payer: MEDICARE

## 2023-08-14 VITALS
TEMPERATURE: 97.5 F | BODY MASS INDEX: 35.4 KG/M2 | WEIGHT: 239 LBS | DIASTOLIC BLOOD PRESSURE: 73 MMHG | OXYGEN SATURATION: 95 % | HEART RATE: 81 BPM | RESPIRATION RATE: 17 BRPM | SYSTOLIC BLOOD PRESSURE: 119 MMHG | HEIGHT: 69 IN

## 2023-08-14 DIAGNOSIS — R60.9 FLUID RETENTION: Primary | ICD-10-CM

## 2023-08-14 DIAGNOSIS — I50.9 CONGESTIVE HEART FAILURE, UNSPECIFIED HF CHRONICITY, UNSPECIFIED HEART FAILURE TYPE (HCC): ICD-10-CM

## 2023-08-14 LAB
ANION GAP SERPL CALC-SCNC: 6 MMOL/L (ref 3–18)
BASOPHILS # BLD: 0 K/UL (ref 0–0.1)
BASOPHILS NFR BLD: 0 % (ref 0–2)
BUN SERPL-MCNC: 25 MG/DL (ref 7–18)
BUN/CREAT SERPL: 17 (ref 12–20)
CALCIUM SERPL-MCNC: 9.1 MG/DL (ref 8.5–10.1)
CHLORIDE SERPL-SCNC: 102 MMOL/L (ref 100–111)
CO2 SERPL-SCNC: 32 MMOL/L (ref 21–32)
CREAT SERPL-MCNC: 1.49 MG/DL (ref 0.6–1.3)
DIFFERENTIAL METHOD BLD: ABNORMAL
EKG ATRIAL RATE: 92 BPM
EKG DIAGNOSIS: NORMAL
EKG Q-T INTERVAL: 318 MS
EKG QRS DURATION: 90 MS
EKG QTC CALCULATION (BAZETT): 408 MS
EKG R AXIS: -59 DEGREES
EKG T AXIS: 79 DEGREES
EKG VENTRICULAR RATE: 99 BPM
EOSINOPHIL # BLD: 0.1 K/UL (ref 0–0.4)
EOSINOPHIL NFR BLD: 1 % (ref 0–5)
ERYTHROCYTE [DISTWIDTH] IN BLOOD BY AUTOMATED COUNT: 15.2 % (ref 11.6–14.5)
GLUCOSE SERPL-MCNC: 108 MG/DL (ref 74–99)
HCT VFR BLD AUTO: 38.4 % (ref 36–48)
HGB BLD-MCNC: 12.3 G/DL (ref 13–16)
IMM GRANULOCYTES # BLD AUTO: 0 K/UL (ref 0–0.04)
IMM GRANULOCYTES NFR BLD AUTO: 0 % (ref 0–0.5)
LYMPHOCYTES # BLD: 2.4 K/UL (ref 0.9–3.6)
LYMPHOCYTES NFR BLD: 31 % (ref 21–52)
MAGNESIUM SERPL-MCNC: 1.4 MG/DL (ref 1.6–2.6)
MCH RBC QN AUTO: 31.1 PG (ref 24–34)
MCHC RBC AUTO-ENTMCNC: 32 G/DL (ref 31–37)
MCV RBC AUTO: 97.2 FL (ref 78–100)
MONOCYTES # BLD: 1.1 K/UL (ref 0.05–1.2)
MONOCYTES NFR BLD: 14 % (ref 3–10)
NEUTS SEG # BLD: 4.1 K/UL (ref 1.8–8)
NEUTS SEG NFR BLD: 54 % (ref 40–73)
NRBC # BLD: 0 K/UL (ref 0–0.01)
NRBC BLD-RTO: 0 PER 100 WBC
NT PRO BNP: 7921 PG/ML (ref 0–1800)
PLATELET # BLD AUTO: 143 K/UL (ref 135–420)
PMV BLD AUTO: 10.7 FL (ref 9.2–11.8)
POTASSIUM SERPL-SCNC: 3.7 MMOL/L (ref 3.5–5.5)
RBC # BLD AUTO: 3.95 M/UL (ref 4.35–5.65)
SODIUM SERPL-SCNC: 140 MMOL/L (ref 136–145)
TROPONIN I SERPL HS-MCNC: 156 NG/L (ref 0–78)
TROPONIN I SERPL HS-MCNC: 171 NG/L (ref 0–78)
WBC # BLD AUTO: 7.6 K/UL (ref 4.6–13.2)

## 2023-08-14 PROCEDURE — 84484 ASSAY OF TROPONIN QUANT: CPT

## 2023-08-14 PROCEDURE — 6360000002 HC RX W HCPCS: Performed by: STUDENT IN AN ORGANIZED HEALTH CARE EDUCATION/TRAINING PROGRAM

## 2023-08-14 PROCEDURE — 80048 BASIC METABOLIC PNL TOTAL CA: CPT

## 2023-08-14 PROCEDURE — 83735 ASSAY OF MAGNESIUM: CPT

## 2023-08-14 PROCEDURE — 93005 ELECTROCARDIOGRAM TRACING: CPT | Performed by: STUDENT IN AN ORGANIZED HEALTH CARE EDUCATION/TRAINING PROGRAM

## 2023-08-14 PROCEDURE — 99285 EMERGENCY DEPT VISIT HI MDM: CPT

## 2023-08-14 PROCEDURE — 83880 ASSAY OF NATRIURETIC PEPTIDE: CPT

## 2023-08-14 PROCEDURE — 85025 COMPLETE CBC W/AUTO DIFF WBC: CPT

## 2023-08-14 PROCEDURE — 94761 N-INVAS EAR/PLS OXIMETRY MLT: CPT

## 2023-08-14 PROCEDURE — 93010 ELECTROCARDIOGRAM REPORT: CPT | Performed by: INTERNAL MEDICINE

## 2023-08-14 PROCEDURE — 71045 X-RAY EXAM CHEST 1 VIEW: CPT

## 2023-08-14 PROCEDURE — 96374 THER/PROPH/DIAG INJ IV PUSH: CPT

## 2023-08-14 RX ORDER — FUROSEMIDE 10 MG/ML
40 INJECTION INTRAMUSCULAR; INTRAVENOUS
Status: COMPLETED | OUTPATIENT
Start: 2023-08-14 | End: 2023-08-14

## 2023-08-14 RX ADMIN — FUROSEMIDE 40 MG: 10 INJECTION, SOLUTION INTRAMUSCULAR; INTRAVENOUS at 07:30

## 2023-08-14 ASSESSMENT — ENCOUNTER SYMPTOMS
VOMITING: 0
SHORTNESS OF BREATH: 1
DIARRHEA: 0
ABDOMINAL PAIN: 0
CHEST TIGHTNESS: 0
NAUSEA: 0
SORE THROAT: 0

## 2023-08-14 NOTE — ED NOTES
Patient ambulated, O2 saturation 99% on RA. Patient denies SOB at this time. Patient returned back to room, resting on stretcher. No acute distress noted. Patient reconnected to cardiac monitor.      Lynne Wharton RN  08/14/23 0900

## 2023-08-14 NOTE — ED NOTES
Received nursing report from [place], [place]. Patient resting comfortably on stretcher, spouse at bedside. Provider talking with family. VSS. Awaiting further orders from provider.      Lynne Wharton RN  08/14/23 9156

## 2023-08-14 NOTE — ED TRIAGE NOTES
Pt present with SOB for past couple days and edema to BLE worsening today. Pt has hx CHF. Pt feels as if having gas and belching.

## 2023-08-14 NOTE — ED NOTES
Pt A0X4, 1X assist to stretcher. Noted pt SOB repositioning self from wheelchair to stretcher. Placed pt on cardiac monitor noted afib, 94% on room air, bed low and locked, call bell within reach, will continue to monitor.      Dayo Molina RN  08/14/23 6425

## 2023-08-14 NOTE — ED PROVIDER NOTES
Absolute 0.0 0.0 - 0.1 K/UL    Absolute Immature Granulocyte 0.0 0.00 - 0.04 K/UL    Differential Type AUTOMATED     Magnesium    Collection Time: 08/14/23  6:38 AM   Result Value Ref Range    Magnesium 1.4 (L) 1.6 - 2.6 mg/dL   Troponin    Collection Time: 08/14/23  6:38 AM   Result Value Ref Range    Troponin, High Sensitivity 171 (HH) 0 - 78 ng/L   Brain Natriuretic Peptide    Collection Time: 08/14/23  6:38 AM   Result Value Ref Range    NT Pro-BNP 7,921 (H) 0 - 1,800 PG/ML   Troponin    Collection Time: 08/14/23  7:33 AM   Result Value Ref Range    Troponin, High Sensitivity 156 (HH) 0 - 78 ng/L       Radiologic Studies -   Non-plain film images such as CT, Ultrasound and MRI are read by the radiologist. Plain radiographic images are visualized and preliminarily interpreted by the emergency physician. XR CHEST PORTABLE   Final Result      Stable mild cardiomegaly. Atherosclerosis. Hypoinflation. Medical Decision Making   I am the first provider for this patient. I reviewed the vital signs, available nursing notes, past medical history, past surgical history, family history and social history. Vital Signs-Reviewed the patient's vital signs. EKG: All EKG's are interpreted by the Emergency Department Physician who either signs or Co-signs this chart in the absence of a cardiologist.      Atrial fibrillation. Interpretation per the Radiologist below, if available at the time of this note:    ED Course: Progress Notes, Reevaluation, and Consults:    Provider Notes (Medical Decision Making):       MDM  Number of Diagnoses or Management Options  Congestive heart failure, unspecified HF chronicity, unspecified heart failure type ([address])  Fluid retention  Diagnosis management comments: Patient in no acute distress. Vital signs stable. 95% on room air. Is afebrile. Appears to have some fluid retention with pitting edema and crackles in his lung bases.   Screening labs obtained

## 2023-08-14 NOTE — DISCHARGE INSTRUCTIONS
Please make a follow up appointment with your primary care doctor to discuss you medications for adjustments as necessary for your fluid retention.  Return to the emergency department for any new or worsening symptoms

## 2023-08-15 ENCOUNTER — OFFICE VISIT (OUTPATIENT)
Age: 83
End: 2023-08-15
Payer: MEDICARE

## 2023-08-15 ENCOUNTER — OFFICE VISIT (OUTPATIENT)
Facility: CLINIC | Age: 83
End: 2023-08-15
Payer: MEDICARE

## 2023-08-15 VITALS
SYSTOLIC BLOOD PRESSURE: 149 MMHG | BODY MASS INDEX: 35.55 KG/M2 | WEIGHT: 240 LBS | HEART RATE: 44 BPM | HEIGHT: 69 IN | DIASTOLIC BLOOD PRESSURE: 60 MMHG | OXYGEN SATURATION: 97 %

## 2023-08-15 VITALS
RESPIRATION RATE: 20 BRPM | HEIGHT: 69 IN | SYSTOLIC BLOOD PRESSURE: 123 MMHG | DIASTOLIC BLOOD PRESSURE: 68 MMHG | HEART RATE: 76 BPM | OXYGEN SATURATION: 95 % | BODY MASS INDEX: 35.1 KG/M2 | WEIGHT: 237 LBS | TEMPERATURE: 98.2 F

## 2023-08-15 DIAGNOSIS — I25.708 ATHEROSCLEROSIS OF CORONARY ARTERY BYPASS GRAFT(S), UNSPECIFIED, WITH OTHER FORMS OF ANGINA PECTORIS (HCC): ICD-10-CM

## 2023-08-15 DIAGNOSIS — I50.32 CHRONIC DIASTOLIC (CONGESTIVE) HEART FAILURE (HCC): Primary | ICD-10-CM

## 2023-08-15 DIAGNOSIS — I48.19 OTHER PERSISTENT ATRIAL FIBRILLATION (HCC): ICD-10-CM

## 2023-08-15 DIAGNOSIS — E66.9 OBESITY (BMI 30-39.9): ICD-10-CM

## 2023-08-15 DIAGNOSIS — I50.33 ACUTE ON CHRONIC DIASTOLIC HEART FAILURE (HCC): ICD-10-CM

## 2023-08-15 DIAGNOSIS — I25.708 ATHEROSCLEROSIS OF CORONARY ARTERY BYPASS GRAFT(S), UNSPECIFIED, WITH OTHER FORMS OF ANGINA PECTORIS (HCC): Primary | ICD-10-CM

## 2023-08-15 DIAGNOSIS — R77.8 ELEVATED TROPONIN: ICD-10-CM

## 2023-08-15 DIAGNOSIS — Z95.1 PRESENCE OF AORTOCORONARY BYPASS GRAFT: ICD-10-CM

## 2023-08-15 DIAGNOSIS — J44.9 CHRONIC OBSTRUCTIVE PULMONARY DISEASE, UNSPECIFIED COPD TYPE (HCC): ICD-10-CM

## 2023-08-15 DIAGNOSIS — I10 ESSENTIAL (PRIMARY) HYPERTENSION: ICD-10-CM

## 2023-08-15 DIAGNOSIS — Z79.01 LONG TERM (CURRENT) USE OF ANTICOAGULANTS: ICD-10-CM

## 2023-08-15 DIAGNOSIS — Z95.818 PRESENCE OF CARDIOMEMS HF SYSTEM: ICD-10-CM

## 2023-08-15 PROCEDURE — 3077F SYST BP >= 140 MM HG: CPT | Performed by: INTERNAL MEDICINE

## 2023-08-15 PROCEDURE — 99214 OFFICE O/P EST MOD 30 MIN: CPT | Performed by: INTERNAL MEDICINE

## 2023-08-15 PROCEDURE — 3078F DIAST BP <80 MM HG: CPT | Performed by: FAMILY MEDICINE

## 2023-08-15 PROCEDURE — G8427 DOCREV CUR MEDS BY ELIG CLIN: HCPCS | Performed by: INTERNAL MEDICINE

## 2023-08-15 PROCEDURE — 99214 OFFICE O/P EST MOD 30 MIN: CPT | Performed by: FAMILY MEDICINE

## 2023-08-15 PROCEDURE — G8427 DOCREV CUR MEDS BY ELIG CLIN: HCPCS | Performed by: FAMILY MEDICINE

## 2023-08-15 PROCEDURE — 1036F TOBACCO NON-USER: CPT | Performed by: FAMILY MEDICINE

## 2023-08-15 PROCEDURE — G8417 CALC BMI ABV UP PARAM F/U: HCPCS | Performed by: FAMILY MEDICINE

## 2023-08-15 PROCEDURE — G8417 CALC BMI ABV UP PARAM F/U: HCPCS | Performed by: INTERNAL MEDICINE

## 2023-08-15 PROCEDURE — 3078F DIAST BP <80 MM HG: CPT | Performed by: INTERNAL MEDICINE

## 2023-08-15 PROCEDURE — 1036F TOBACCO NON-USER: CPT | Performed by: INTERNAL MEDICINE

## 2023-08-15 PROCEDURE — 3023F SPIROM DOC REV: CPT | Performed by: FAMILY MEDICINE

## 2023-08-15 PROCEDURE — 3074F SYST BP LT 130 MM HG: CPT | Performed by: FAMILY MEDICINE

## 2023-08-15 PROCEDURE — 1123F ACP DISCUSS/DSCN MKR DOCD: CPT | Performed by: FAMILY MEDICINE

## 2023-08-15 PROCEDURE — 1123F ACP DISCUSS/DSCN MKR DOCD: CPT | Performed by: INTERNAL MEDICINE

## 2023-08-15 RX ORDER — THIAMINE HCL 100 MG
500 TABLET ORAL DAILY
Qty: 90 TABLET | Refills: 1 | Status: SHIPPED | OUTPATIENT
Start: 2023-08-15

## 2023-08-15 RX ORDER — LIDOCAINE 50 MG/G
1 PATCH TOPICAL DAILY PRN
COMMUNITY

## 2023-08-15 RX ORDER — METHOCARBAMOL 500 MG/1
TABLET, FILM COATED ORAL
COMMUNITY
Start: 2023-02-07 | End: 2024-02-07

## 2023-08-15 RX ORDER — PANTOPRAZOLE SODIUM 40 MG/1
40 TABLET, DELAYED RELEASE ORAL DAILY
Qty: 90 TABLET | Refills: 1 | Status: SHIPPED | OUTPATIENT
Start: 2023-08-15

## 2023-08-15 SDOH — ECONOMIC STABILITY: FOOD INSECURITY: WITHIN THE PAST 12 MONTHS, YOU WORRIED THAT YOUR FOOD WOULD RUN OUT BEFORE YOU GOT MONEY TO BUY MORE.: NEVER TRUE

## 2023-08-15 SDOH — ECONOMIC STABILITY: FOOD INSECURITY: WITHIN THE PAST 12 MONTHS, THE FOOD YOU BOUGHT JUST DIDN'T LAST AND YOU DIDN'T HAVE MONEY TO GET MORE.: NEVER TRUE

## 2023-08-15 SDOH — ECONOMIC STABILITY: INCOME INSECURITY: HOW HARD IS IT FOR YOU TO PAY FOR THE VERY BASICS LIKE FOOD, HOUSING, MEDICAL CARE, AND HEATING?: NOT VERY HARD

## 2023-08-15 ASSESSMENT — PATIENT HEALTH QUESTIONNAIRE - PHQ9
SUM OF ALL RESPONSES TO PHQ QUESTIONS 1-9: 0
1. LITTLE INTEREST OR PLEASURE IN DOING THINGS: 0
SUM OF ALL RESPONSES TO PHQ QUESTIONS 1-9: 0
SUM OF ALL RESPONSES TO PHQ9 QUESTIONS 1 & 2: 0
SUM OF ALL RESPONSES TO PHQ QUESTIONS 1-9: 0
SUM OF ALL RESPONSES TO PHQ9 QUESTIONS 1 & 2: 0
2. FEELING DOWN, DEPRESSED OR HOPELESS: 0
SUM OF ALL RESPONSES TO PHQ QUESTIONS 1-9: 0
1. LITTLE INTEREST OR PLEASURE IN DOING THINGS: 0
2. FEELING DOWN, DEPRESSED OR HOPELESS: 0

## 2023-08-15 NOTE — PROGRESS NOTES
1. \"Have you been to the ER, urgent care clinic since your last visit? Hospitalized since your last visit? \"Yes Where: [facility]    2. \"Have you seen or consulted any other health care providers outside of the [address] [place] [address] since your last visit? \" No    3. For patients aged 43-73: Has the patient had a colonoscopy / FIT/ Cologuard? Not applicable      If the patient is female:    4. For patients aged 43-66: Has the patient had a mammogram within the past 2 years? Not applicable      5. For patients aged 21-65: Has the patient had a pap smear?  Not applicable Patient returned Nurse's call, Nurse was unavailable, please call, Thanks

## 2023-08-15 NOTE — PROGRESS NOTES
MARLYN Lewis comes in for follow-up care. He is accompanied by his wife. CHF: Patient has a history of chronic diastolic CHF. Patient has acute exacerbation of the CHF. He has lower extremity swelling and had shortness of breath. He was seen in the emergency room and given IV Lasix. He has been seen by the cardiologist this morning. Patient has been advised to increase furosemide and metolazone over the next 3 days. We discussed keeping a weight log over this time. Patient has also been scheduled for echocardiogram and nuclear stress test.  Was noted to have elevated troponins at his visit in the emergency room. Patient will follow-up with the cardiologist and I will follow-up with him at next visit. Patient has CardioMEMS in place. Hypertension: Patient has hypertension. Blood pressure is stable. Patient is on amlodipine. He will continue with the current treatment plan. CAD: Patient has a history of coronary artery disease. He denies chest pain. He has occasional shortness of breath. Patient had a cough but this has improved. This is likely due to exacerbated CHF. Patient denies diaphoresis. Troponin levels were elevated recently in the emergency room. Patient will get nuclear stress test and echocardiogram done. COPD: Patient has COPD. Patient is on albuterol and Trelegy Ellipta. He has been followed up by the pulmonologist.  He will continue with the current treatment plan. Hyperlipidemia: Patient has dyslipidemia. Patient is on pravastatin. Patient will take a diet low in polysaturated fats. We will recheck lipid panel at next visit.       Past Medical History  Past Medical History:   Diagnosis Date    CAD (coronary artery disease)     Congestive heart failure ([address])     Diabetes ([address])     Heart failure ([address])     History of low potassium     Hypertension     Stroke [facility])        Surgical History  Past Surgical History:   Procedure Laterality Date    EYE SURGERY      MAy 2

## 2023-08-16 ENCOUNTER — TELEPHONE (OUTPATIENT)
Age: 83
End: 2023-08-16

## 2023-08-16 NOTE — TELEPHONE ENCOUNTER
Pt spouse advised that the pillow kept repeating the same thing and not transmitting-provided [employer] number for troubleshooting: [phone] as well as my contact for follow up through to resolution.

## 2023-08-16 NOTE — TELEPHONE ENCOUNTER
Rep advised last transmission was over one year ago-no cord with pillow to complete testing--will contact Pt to coordinate testing and possible replacement.

## 2023-08-24 ENCOUNTER — HOSPITAL ENCOUNTER (OUTPATIENT)
Dept: SLEEP MEDICINE | Facility: HOSPITAL | Age: 83
Discharge: HOME | End: 2023-08-24
Attending: INTERNAL MEDICINE
Payer: MEDICARE

## 2023-08-24 DIAGNOSIS — G47.33 OSA AND COPD OVERLAP SYNDROME (HCC): ICD-10-CM

## 2023-08-24 DIAGNOSIS — J44.9 OSA AND COPD OVERLAP SYNDROME (HCC): ICD-10-CM

## 2023-08-24 DIAGNOSIS — I50.32 CHRONIC DIASTOLIC CONGESTIVE HEART FAILURE (HCC): ICD-10-CM

## 2023-08-24 DIAGNOSIS — I10 HYPERTENSION, UNSPECIFIED TYPE: ICD-10-CM

## 2023-08-24 DIAGNOSIS — E66.9 OBESITY (BMI 30-39.9): ICD-10-CM

## 2023-08-24 PROCEDURE — 95811 POLYSOM 6/>YRS CPAP 4/> PARM: CPT

## 2023-08-24 ASSESSMENT — SLEEP AND FATIGUE QUESTIONNAIRES
HOW LIKELY ARE YOU TO NOD OFF OR FALL ASLEEP WHILE SITTING QUIETLY AFTER LUNCH WITHOUT ALCOHOL: 2
HOW LIKELY ARE YOU TO NOD OFF OR FALL ASLEEP WHILE SITTING AND READING: 2
HOW LIKELY ARE YOU TO NOD OFF OR FALL ASLEEP WHILE LYING DOWN TO REST IN THE AFTERNOON WHEN CIRCUMSTANCES PERMIT: 3
HOW LIKELY ARE YOU TO NOD OFF OR FALL ASLEEP IN A CAR, WHILE STOPPED FOR A FEW MINUTES IN TRAFFIC: 0
HOW LIKELY ARE YOU TO NOD OFF OR FALL ASLEEP WHILE WATCHING TV: 3
HOW LIKELY ARE YOU TO NOD OFF OR FALL ASLEEP WHILE SITTING INACTIVE IN A PUBLIC PLACE: 3
HOW LIKELY ARE YOU TO NOD OFF OR FALL ASLEEP WHEN YOU ARE A PASSENGER IN A CAR FOR AN HOUR WITHOUT A BREAK: 3
NECK CIRCUMFERENCE (INCHES): 16.5
ESS TOTAL SCORE: 17
HOW LIKELY ARE YOU TO NOD OFF OR FALL ASLEEP WHILE SITTING AND TALKING TO SOMEONE: 1

## 2023-08-25 VITALS
HEART RATE: 53 BPM | SYSTOLIC BLOOD PRESSURE: 126 MMHG | BODY MASS INDEX: 33 KG/M2 | HEIGHT: 69 IN | WEIGHT: 222.8 LBS | DIASTOLIC BLOOD PRESSURE: 80 MMHG

## 2023-09-06 ENCOUNTER — OFFICE VISIT (OUTPATIENT)
Facility: CLINIC | Age: 83
End: 2023-09-06
Payer: MEDICARE

## 2023-09-06 VITALS
OXYGEN SATURATION: 96 % | WEIGHT: 224 LBS | BODY MASS INDEX: 33.18 KG/M2 | SYSTOLIC BLOOD PRESSURE: 139 MMHG | HEART RATE: 69 BPM | DIASTOLIC BLOOD PRESSURE: 74 MMHG | TEMPERATURE: 98.2 F | RESPIRATION RATE: 20 BRPM | HEIGHT: 69 IN

## 2023-09-06 DIAGNOSIS — I25.708 ATHEROSCLEROSIS OF CORONARY ARTERY BYPASS GRAFT(S), UNSPECIFIED, WITH OTHER FORMS OF ANGINA PECTORIS (HCC): ICD-10-CM

## 2023-09-06 DIAGNOSIS — L30.9 DERMATITIS: ICD-10-CM

## 2023-09-06 DIAGNOSIS — Z23 NEED FOR IMMUNIZATION AGAINST INFLUENZA: ICD-10-CM

## 2023-09-06 DIAGNOSIS — Z74.09 IMPAIRED MOBILITY: ICD-10-CM

## 2023-09-06 DIAGNOSIS — D68.9 COAGULATION DEFECT (HCC): ICD-10-CM

## 2023-09-06 DIAGNOSIS — F39 UNSPECIFIED MOOD (AFFECTIVE) DISORDER (HCC): ICD-10-CM

## 2023-09-06 DIAGNOSIS — E78.5 HYPERLIPIDEMIA, UNSPECIFIED HYPERLIPIDEMIA TYPE: ICD-10-CM

## 2023-09-06 DIAGNOSIS — I50.32 CHRONIC DIASTOLIC (CONGESTIVE) HEART FAILURE (HCC): Primary | ICD-10-CM

## 2023-09-06 DIAGNOSIS — I10 ESSENTIAL (PRIMARY) HYPERTENSION: ICD-10-CM

## 2023-09-06 DIAGNOSIS — K21.9 GASTRO-ESOPHAGEAL REFLUX DISEASE WITHOUT ESOPHAGITIS: ICD-10-CM

## 2023-09-06 DIAGNOSIS — N18.30 STAGE 3 CHRONIC KIDNEY DISEASE, UNSPECIFIED WHETHER STAGE 3A OR 3B CKD (HCC): ICD-10-CM

## 2023-09-06 PROCEDURE — G8427 DOCREV CUR MEDS BY ELIG CLIN: HCPCS | Performed by: FAMILY MEDICINE

## 2023-09-06 PROCEDURE — 1036F TOBACCO NON-USER: CPT | Performed by: FAMILY MEDICINE

## 2023-09-06 PROCEDURE — G8417 CALC BMI ABV UP PARAM F/U: HCPCS | Performed by: FAMILY MEDICINE

## 2023-09-06 PROCEDURE — 99215 OFFICE O/P EST HI 40 MIN: CPT | Performed by: FAMILY MEDICINE

## 2023-09-06 PROCEDURE — 1123F ACP DISCUSS/DSCN MKR DOCD: CPT | Performed by: FAMILY MEDICINE

## 2023-09-06 PROCEDURE — 3078F DIAST BP <80 MM HG: CPT | Performed by: FAMILY MEDICINE

## 2023-09-06 PROCEDURE — G0008 ADMIN INFLUENZA VIRUS VAC: HCPCS | Performed by: FAMILY MEDICINE

## 2023-09-06 PROCEDURE — 90694 VACC AIIV4 NO PRSRV 0.5ML IM: CPT | Performed by: FAMILY MEDICINE

## 2023-09-06 PROCEDURE — 3074F SYST BP LT 130 MM HG: CPT | Performed by: FAMILY MEDICINE

## 2023-09-06 RX ORDER — TRIAMCINOLONE ACETONIDE 0.25 MG/G
CREAM TOPICAL
Qty: 80 G | Refills: 1 | Status: SHIPPED | OUTPATIENT
Start: 2023-09-06

## 2023-09-06 SDOH — ECONOMIC STABILITY: FOOD INSECURITY: WITHIN THE PAST 12 MONTHS, THE FOOD YOU BOUGHT JUST DIDN'T LAST AND YOU DIDN'T HAVE MONEY TO GET MORE.: NEVER TRUE

## 2023-09-06 SDOH — ECONOMIC STABILITY: FOOD INSECURITY: WITHIN THE PAST 12 MONTHS, YOU WORRIED THAT YOUR FOOD WOULD RUN OUT BEFORE YOU GOT MONEY TO BUY MORE.: NEVER TRUE

## 2023-09-06 SDOH — ECONOMIC STABILITY: INCOME INSECURITY: HOW HARD IS IT FOR YOU TO PAY FOR THE VERY BASICS LIKE FOOD, HOUSING, MEDICAL CARE, AND HEATING?: NOT VERY HARD

## 2023-09-06 ASSESSMENT — PATIENT HEALTH QUESTIONNAIRE - PHQ9
2. FEELING DOWN, DEPRESSED OR HOPELESS: 0
SUM OF ALL RESPONSES TO PHQ QUESTIONS 1-9: 0
1. LITTLE INTEREST OR PLEASURE IN DOING THINGS: 0
SUM OF ALL RESPONSES TO PHQ QUESTIONS 1-9: 0
SUM OF ALL RESPONSES TO PHQ9 QUESTIONS 1 & 2: 0
SUM OF ALL RESPONSES TO PHQ QUESTIONS 1-9: 0
SUM OF ALL RESPONSES TO PHQ QUESTIONS 1-9: 0

## 2023-09-06 NOTE — PROGRESS NOTES
Patient received fluad quad adjuvanted immunization IM to left deltoid. Hetolerated procedure well. Patient was observed for 10 minutes, no adverse effects noted. He left ambulatory with no complaints of pain or distress noted. Patient hasreceived immunizations in office prior to today. 1. \"Have you been to the ER, urgent care clinic since your last visit? Hospitalized since your last visit? \"No    2. \"Have you seen or consulted any other health care providers outside of the [address] [place] [address] since your last visit? \" No    3. For patients aged 43-73: Has the patient had a colonoscopy / FIT/ Cologuard? Not applicable      If the patient is female:    4. For patients aged 43-66: Has the patient had a mammogram within the past 2 years? Not applicable      5. For patients aged 21-65: Has the patient had a pap smear?  Not applicable
puff into the lungs daily 3 each 3    amLODIPine (NORVASC) 10 MG tablet Take 1 tablet by mouth daily 30 tablet 5    docusate (COLACE, DULCOLAX) 100 MG CAPS Take 100 mg by mouth daily as needed (constipation) 90 capsule 1    pravastatin (PRAVACHOL) 20 MG tablet Take 1 tablet by mouth daily 90 tablet 1    potassium chloride (KLOR-CON M) 20 MEQ extended release tablet Take 1 tablet by mouth 2 times daily 180 tablet 1    Acetaminophen 325 MG CAPS Take 2 tablets by mouth 4 times daily as needed      ammonium lactate (LAC-HYDRIN) 12 % lotion Apply 12 Bottles topically 2 times daily as needed      apixaban (ELIQUIS) 5 MG TABS tablet Take 0.5 tablets by mouth 2 times daily      aspirin 81 MG EC tablet Take 1 tablet by mouth daily      vitamin D (CHOLECALCIFEROL) 25 MCG (1000 UT) TABS tablet Take 1 tablet by mouth 2 times daily      ferrous sulfate (FE TABS 325) 325 (65 Fe) MG EC tablet Take 1 tablet by mouth daily      furosemide (LASIX) 40 MG tablet Take 1 tablet by mouth daily      nitroGLYCERIN (NITROSTAT) 0.3 MG SL tablet Place 1 tablet under the tongue      sertraline (ZOLOFT) 100 MG tablet Take 1.5 tablets by mouth daily       No current facility-administered medications for this visit. Allergies  Allergies   Allergen Reactions    Penicillin G Swelling    Niacin      itching         Family History  History reviewed. No pertinent family history.     Social History  Social History     Socioeconomic History    Marital status:      Spouse name: Not on file    Number of children: Not on file    Years of education: Not on file    Highest education level: Not on file   Occupational History    Not on file   Tobacco Use    Smoking status: Never     Passive exposure: Never    Smokeless tobacco: Never   Substance and Sexual Activity    Alcohol use: No    Drug use: No    Sexual activity: Not on file   Other Topics Concern    Not on file   Social History Narrative    Not on file     Social Determinants of Health

## 2023-09-07 ENCOUNTER — OFFICE VISIT (OUTPATIENT)
Age: 83
End: 2023-09-07
Payer: MEDICARE

## 2023-09-07 VITALS
HEIGHT: 69 IN | BODY MASS INDEX: 32.88 KG/M2 | OXYGEN SATURATION: 96 % | WEIGHT: 222 LBS | SYSTOLIC BLOOD PRESSURE: 122 MMHG | DIASTOLIC BLOOD PRESSURE: 76 MMHG | HEART RATE: 80 BPM

## 2023-09-07 DIAGNOSIS — Z79.01 LONG TERM (CURRENT) USE OF ANTICOAGULANTS: ICD-10-CM

## 2023-09-07 DIAGNOSIS — I50.33 ACUTE ON CHRONIC DIASTOLIC HEART FAILURE (HCC): ICD-10-CM

## 2023-09-07 DIAGNOSIS — I25.708 ATHEROSCLEROSIS OF CORONARY ARTERY BYPASS GRAFT(S), UNSPECIFIED, WITH OTHER FORMS OF ANGINA PECTORIS (HCC): Primary | ICD-10-CM

## 2023-09-07 DIAGNOSIS — Z95.1 PRESENCE OF AORTOCORONARY BYPASS GRAFT: ICD-10-CM

## 2023-09-07 DIAGNOSIS — I48.19 OTHER PERSISTENT ATRIAL FIBRILLATION (HCC): ICD-10-CM

## 2023-09-07 DIAGNOSIS — I10 ESSENTIAL (PRIMARY) HYPERTENSION: ICD-10-CM

## 2023-09-07 PROCEDURE — 3078F DIAST BP <80 MM HG: CPT | Performed by: NURSE PRACTITIONER

## 2023-09-07 PROCEDURE — 1123F ACP DISCUSS/DSCN MKR DOCD: CPT | Performed by: NURSE PRACTITIONER

## 2023-09-07 PROCEDURE — 1036F TOBACCO NON-USER: CPT | Performed by: NURSE PRACTITIONER

## 2023-09-07 PROCEDURE — G8417 CALC BMI ABV UP PARAM F/U: HCPCS | Performed by: NURSE PRACTITIONER

## 2023-09-07 PROCEDURE — G8427 DOCREV CUR MEDS BY ELIG CLIN: HCPCS | Performed by: NURSE PRACTITIONER

## 2023-09-07 PROCEDURE — 3074F SYST BP LT 130 MM HG: CPT | Performed by: NURSE PRACTITIONER

## 2023-09-07 PROCEDURE — 99214 OFFICE O/P EST MOD 30 MIN: CPT | Performed by: NURSE PRACTITIONER

## 2023-09-07 ASSESSMENT — PATIENT HEALTH QUESTIONNAIRE - PHQ9
SUM OF ALL RESPONSES TO PHQ QUESTIONS 1-9: 0
2. FEELING DOWN, DEPRESSED OR HOPELESS: 0
1. LITTLE INTEREST OR PLEASURE IN DOING THINGS: 0
SUM OF ALL RESPONSES TO PHQ QUESTIONS 1-9: 0
SUM OF ALL RESPONSES TO PHQ9 QUESTIONS 1 & 2: 0

## 2023-09-07 NOTE — PROGRESS NOTES
1. Have you been to the ER, urgent care clinic since your last visit? Hospitalized since your last visit?     no    2. Have you seen or consulted any other health care providers outside of the [address] [place] [address] since your last visit? Include any pap smears or colon screening.       Yes pcp

## 2023-09-07 NOTE — PROGRESS NOTES
HISTORY OF PRESENT ILLNESS  Laura Pastor is a 79 y.o.  male. CHF   The history is provided by the Patient. This is a chronic problem. The problem occurs daily. The problem has been gradually  improving. Associated symptoms include shortness of breath. Hypertension   The history is provided by the Patient. This is a chronic problem. The problem occurs every several days. The problem has been  gradually improving. Associated symptoms include shortness of breath. Shortness of Breath   The history is provided by the Patient. This is a chronic problem. The problem occurs intermittently. The problem has been gradually improving. Pertinent negatives include no fever, no ear pain, no neck pain, no cough, no sputum production, no hemoptysis, no wheezing, no PND,  no orthopnea, no vomiting, no rash, no leg swelling and no claudication. Associated medical issues include CAD and heart failure. Review of Systems    Constitutional:  Negative for chills, diaphoresis, fever, malaise/fatigue and weight loss. HENT:  Negative for congestion, ear discharge, ear pain, hearing loss, nosebleeds and tinnitus. Eyes:  Negative for blurred vision. Respiratory:  Positive for shortness of breath. Negative for cough, hemoptysis, sputum production, wheezing and stridor. Cardiovascular:  Negative for palpitations, orthopnea, claudication, leg swelling and PND. Gastrointestinal:  Negative for heartburn, nausea and vomiting. Musculoskeletal:  Negative for myalgias and neck pain. Skin:  Negative for itching and rash. Neurological:  Negative for tingling, tremors, focal weakness, loss of consciousness and weakness. Psychiatric/Behavioral:  Negative for depression and suicidal ideas. No family history on file.     Past Medical History:   Diagnosis Date    CAD (coronary artery disease)     Congestive heart failure (HCC)     Diabetes ([address] )     Heart failure ([address] )     History of low potassium

## 2023-09-07 NOTE — PATIENT INSTRUCTIONS
Continue current diuretics    Low sodium diet    Follow up regarding cardiac mems pillow    Wear compression stockings

## 2023-09-12 ENCOUNTER — TELEPHONE (OUTPATIENT)
Age: 83
End: 2023-09-12

## 2023-09-12 NOTE — TELEPHONE ENCOUNTER
Pt spouse called to advise she has been working with vendor ([employer]), however feels that she has not received appropriate assistance to resolve the issue.  Pt requested this RN contact vendor to assist.

## 2023-09-12 NOTE — TELEPHONE ENCOUNTER
Contacted [employer] customer care and shared Pt concerns-customer care suggested conferencing with the Pt and the remote care team to facilitate resolution.

## 2023-09-12 NOTE — TELEPHONE ENCOUNTER
This RN contacted Pt spouse to relay information from customer care-- Josh Edwar agreed conference could be beneficial and advised she would contact me tomorrow to complete.

## 2023-09-15 NOTE — TELEPHONE ENCOUNTER
Contacted Pt spouse Ángel Oliver) who advised that she is unable to locate cord that plugs into the pillow for testing--This RN will contact the vendor to request a replacement cord.

## 2023-09-15 NOTE — TELEPHONE ENCOUNTER
Contacted [employer] to request replacement cord for patient. A charge will be required for the replacement cord. Contacted Pt and Pt spouse at number listed on file, validated both sides of cord are lost. Pt aware that charge will be required for replacement cord and would like to move forward. Advised Mr. And Mrs. Mccormick Amanda that I would obtain contact information to complete the transaction and relay to them. Pt requested LVM with information.     LVM for Pt with vendor contact:  Blaise [phone], press option #4

## 2023-09-21 ENCOUNTER — OFFICE VISIT (OUTPATIENT)
Facility: CLINIC | Age: 83
End: 2023-09-21
Payer: MEDICARE

## 2023-09-21 DIAGNOSIS — I50.32 CHRONIC DIASTOLIC (CONGESTIVE) HEART FAILURE (HCC): ICD-10-CM

## 2023-09-21 DIAGNOSIS — K21.9 GASTRO-ESOPHAGEAL REFLUX DISEASE WITHOUT ESOPHAGITIS: ICD-10-CM

## 2023-09-21 DIAGNOSIS — J44.9 CHRONIC OBSTRUCTIVE PULMONARY DISEASE, UNSPECIFIED COPD TYPE (HCC): ICD-10-CM

## 2023-09-21 DIAGNOSIS — N18.30 STAGE 3 CHRONIC KIDNEY DISEASE, UNSPECIFIED WHETHER STAGE 3A OR 3B CKD (HCC): ICD-10-CM

## 2023-09-21 DIAGNOSIS — Z00.00 MEDICARE ANNUAL WELLNESS VISIT, SUBSEQUENT: Primary | ICD-10-CM

## 2023-09-21 DIAGNOSIS — I10 ESSENTIAL (PRIMARY) HYPERTENSION: ICD-10-CM

## 2023-09-21 DIAGNOSIS — E66.9 OBESITY (BMI 30-39.9): ICD-10-CM

## 2023-09-21 DIAGNOSIS — E78.5 HYPERLIPIDEMIA, UNSPECIFIED HYPERLIPIDEMIA TYPE: ICD-10-CM

## 2023-09-21 PROCEDURE — 1123F ACP DISCUSS/DSCN MKR DOCD: CPT | Performed by: FAMILY MEDICINE

## 2023-09-21 PROCEDURE — 3074F SYST BP LT 130 MM HG: CPT | Performed by: FAMILY MEDICINE

## 2023-09-21 PROCEDURE — G0439 PPPS, SUBSEQ VISIT: HCPCS | Performed by: FAMILY MEDICINE

## 2023-09-21 PROCEDURE — 3078F DIAST BP <80 MM HG: CPT | Performed by: FAMILY MEDICINE

## 2023-09-21 SDOH — ECONOMIC STABILITY: FOOD INSECURITY: WITHIN THE PAST 12 MONTHS, YOU WORRIED THAT YOUR FOOD WOULD RUN OUT BEFORE YOU GOT MONEY TO BUY MORE.: NEVER TRUE

## 2023-09-21 SDOH — ECONOMIC STABILITY: INCOME INSECURITY: HOW HARD IS IT FOR YOU TO PAY FOR THE VERY BASICS LIKE FOOD, HOUSING, MEDICAL CARE, AND HEATING?: NOT VERY HARD

## 2023-09-21 SDOH — ECONOMIC STABILITY: FOOD INSECURITY: WITHIN THE PAST 12 MONTHS, THE FOOD YOU BOUGHT JUST DIDN'T LAST AND YOU DIDN'T HAVE MONEY TO GET MORE.: NEVER TRUE

## 2023-09-21 ASSESSMENT — PATIENT HEALTH QUESTIONNAIRE - PHQ9
SUM OF ALL RESPONSES TO PHQ QUESTIONS 1-9: 0
1. LITTLE INTEREST OR PLEASURE IN DOING THINGS: 0
2. FEELING DOWN, DEPRESSED OR HOPELESS: 0
SUM OF ALL RESPONSES TO PHQ9 QUESTIONS 1 & 2: 0
SUM OF ALL RESPONSES TO PHQ QUESTIONS 1-9: 0

## 2023-09-21 NOTE — PROGRESS NOTES
Medicare Annual Wellness Visit    Jeremy Flow is here for Medicare AWV and Hypertension    Assessment & Plan    Diagnosis Orders   1. Medicare annual wellness visit, subsequent        2. Chronic diastolic (congestive) heart failure ([address])        3. Essential (primary) hypertension        4. Gastro-esophageal reflux disease without esophagitis        5. Hyperlipidemia, unspecified hyperlipidemia type        6. Obesity (BMI 30-39.9)        7. Chronic obstructive pulmonary disease, unspecified COPD type ([address])        8. Stage 3 chronic kidney disease, unspecified whether stage 3a or 3b CKD ([address])          Recommendations for Preventive Services Due: see orders and patient instructions/AVS.  Recommended screening schedule for the next 5-10 years is provided to the patient in written form: see Patient Instructions/AVS.     Return if symptoms worsen or fail to improve, for Hypertension. Subjective   Jeremy Flow comes in for Medicare wellness exam.  CHF: Patient has chronic diastolic CHF. He has been followed up with a cardiologist.  Currently stable. He had pedal edema previously but this has gone down markedly. He is on furosemide, aspirin, Eliquis, metolazone as needed. He also takes potassium supplementation. Overall he is stable. We will continue current treatment plan. Patient has a cardioMEMS in place. He denies chest pain, shortness of breath or diaphoresis. Hypertension: Patient has hypertension. Blood pressure is stable. Patient is on amlodipine. We will continue current treatment plan. GERD: Patient has gastroesophageal reflux disease. He gets heartburn on and off. He is on Protonix. Stable on medication. Denies dark stools or hematemesis. Mood disorder: Patient has mood disorder with anxiety and depression. He takes Zoloft. Stable on medication. Dyslipidemia: Patient has dyslipidemia. He is on pravastatin. Stable on medication.   He will take a diet low in polysaturated

## 2023-09-21 NOTE — PROGRESS NOTES
1. \"Have you been to the ER, urgent care clinic since your last visit? Hospitalized since your last visit? \"No    2. \"Have you seen or consulted any other health care providers outside of the [address] [place] [address] since your last visit? \" No    3. For patients aged 43-73: Has the patient had a colonoscopy / FIT/ Cologuard? Yes      If the patient is female:    4. For patients aged 43-66: Has the patient had a mammogram within the past 2 years? Not applicable      5. For patients aged 21-65: Has the patient had a pap smear?  Not applicable

## 2023-09-24 VITALS
WEIGHT: 215 LBS | OXYGEN SATURATION: 97 % | RESPIRATION RATE: 20 BRPM | HEIGHT: 69 IN | SYSTOLIC BLOOD PRESSURE: 133 MMHG | HEART RATE: 74 BPM | DIASTOLIC BLOOD PRESSURE: 86 MMHG | TEMPERATURE: 98.8 F | BODY MASS INDEX: 31.84 KG/M2

## 2023-11-20 ENCOUNTER — OFFICE VISIT (OUTPATIENT)
Age: 83
End: 2023-11-20
Payer: MEDICARE

## 2023-11-20 VITALS
SYSTOLIC BLOOD PRESSURE: 125 MMHG | OXYGEN SATURATION: 97 % | DIASTOLIC BLOOD PRESSURE: 73 MMHG | BODY MASS INDEX: 31.84 KG/M2 | HEART RATE: 79 BPM | RESPIRATION RATE: 16 BRPM | WEIGHT: 215 LBS | TEMPERATURE: 97.1 F | HEIGHT: 69 IN

## 2023-11-20 DIAGNOSIS — I27.20 PULMONARY HYPERTENSION (HCC): ICD-10-CM

## 2023-11-20 DIAGNOSIS — G47.33 OSA (OBSTRUCTIVE SLEEP APNEA): ICD-10-CM

## 2023-11-20 DIAGNOSIS — J44.9 CHRONIC OBSTRUCTIVE PULMONARY DISEASE, UNSPECIFIED COPD TYPE (HCC): Primary | ICD-10-CM

## 2023-11-20 PROCEDURE — 3074F SYST BP LT 130 MM HG: CPT | Performed by: INTERNAL MEDICINE

## 2023-11-20 PROCEDURE — G8484 FLU IMMUNIZE NO ADMIN: HCPCS | Performed by: INTERNAL MEDICINE

## 2023-11-20 PROCEDURE — G8417 CALC BMI ABV UP PARAM F/U: HCPCS | Performed by: INTERNAL MEDICINE

## 2023-11-20 PROCEDURE — G8428 CUR MEDS NOT DOCUMENT: HCPCS | Performed by: INTERNAL MEDICINE

## 2023-11-20 PROCEDURE — 1036F TOBACCO NON-USER: CPT | Performed by: INTERNAL MEDICINE

## 2023-11-20 PROCEDURE — 3078F DIAST BP <80 MM HG: CPT | Performed by: INTERNAL MEDICINE

## 2023-11-20 PROCEDURE — 99214 OFFICE O/P EST MOD 30 MIN: CPT | Performed by: INTERNAL MEDICINE

## 2023-11-20 PROCEDURE — 3023F SPIROM DOC REV: CPT | Performed by: INTERNAL MEDICINE

## 2023-11-20 PROCEDURE — 1123F ACP DISCUSS/DSCN MKR DOCD: CPT | Performed by: INTERNAL MEDICINE

## 2023-11-20 RX ORDER — ALBUTEROL SULFATE 2.5 MG/3ML
2.5 SOLUTION RESPIRATORY (INHALATION) EVERY 4 HOURS PRN
Qty: 120 EACH | Refills: 5 | Status: SHIPPED | OUTPATIENT
Start: 2023-11-20

## 2023-11-20 RX ORDER — ALBUTEROL SULFATE 90 UG/1
1 AEROSOL, METERED RESPIRATORY (INHALATION) EVERY 4 HOURS PRN
Qty: 18 G | Refills: 5 | Status: SHIPPED | OUTPATIENT
Start: 2023-11-20

## 2023-11-20 RX ORDER — FLUTICASONE FUROATE, UMECLIDINIUM BROMIDE AND VILANTEROL TRIFENATATE 200; 62.5; 25 UG/1; UG/1; UG/1
1 POWDER RESPIRATORY (INHALATION) DAILY
Qty: 3 EACH | Refills: 5 | Status: SHIPPED | OUTPATIENT
Start: 2023-11-20

## 2023-11-20 NOTE — PATIENT INSTRUCTIONS
What is the plan?  -Continue Trelegy as prescribed  -Continue Albuterol rescue inhaler as needed  -Complete X-ray of your chest  -Follow-up with Sleep medicine - Dr. Lionel Chavez  -Increase activity as much as able      SLEEP INSTRUCTION:  Use your PAP during all sleep time including naps. Use it more than 70% of the time. AIM FOR AT LEAST 5 HOURS OF USE PER NIGHT EVERY NIGHT! If not compliant within 90 day trial period you will likely have to repeat testing per your insurance. >Remember that mask fit is very important to success and to call me or your DME supplier right away if you have mask issues after PAP set up. >Remember that current data strongly suggests that adequate CPAP use reduces cardiovascular risk. >Avoid sedating medications, including narcotics, and alcohol, as these may exacerbate sleep apnea and/or underlying respiratory disorder.      > Return for office follow up appointment in 8 weeks for re-evaluation and renewal of your CPAP prescription. Bring your CPAP data card. > Arrive 10 - 15 minutes early to fill out insurance and health update.  > I recommend that you replace your mask every 3 - 6 months to help make sure it's comfortable and easily maintains it's seal.  > If you are sleepy, don't perform activities that require an alert mind to assure safety (eg., driving, operating machinery, etc). > Continue efforts at weight control through diet and prudent exercise.  > Continue the medicines prescribed by you other physicians.   > Review the medication list - if there are any errors, let us know.  > Continue your follow up with your other physicians for your other medical conditions

## 2023-11-20 NOTE — PROGRESS NOTES
[facility]                                                       Pulmonary, Critical Care, and Sleep Medicine      Pulmonary Office Progress Notes. Name: Lalo Lizarraga     : 1940     Date: 2023        Subjective:   Chief complaint: COPD  Patient is a 79 y.o. male with a PMHx of CAD s/p CABG x4, HFrEF s/p CardioMEMs device, CKD, HTN, HFpEF, Afib (on Eliquis), CVA, DM, Dyslipidemia, Mood disorder and Central sleep apnea. HPI(23): In the interim, seen at [facility] ED due to LE edema due to decompensated heart failure. Nuclear stress test which was consistent with moderate risk. Today in clinic, he states that his breathing has improved and he reports significant relief of his symptoms with Trelegy. No cough but admits to intermittent wheezing. Notes that he goes some days without getting up to walk around at all. Ambulates with a walker and wheelchair. Notes that he is limited by leg weakness and dyspnea. Admits to weight loss of ~56Lbs (25Lbs in the past year). Notes he doesn't eat as much as he used to in the past.  Physical Therapy - scheduled for Dec 6th, 2023. Currently on:  -Trelegy 200/62.5/25 mcg - using 1 puff daily; rinsing mouth after use.  -Albuterol rescue inhaler - using prn.  -Albuterol nebulizer - using prn. Sleep - completed split night study. Not currently on CPAP therapy. He is here today with his wife who supplements HPI. HPI (7/10/23): Of note, he was seen in the ED last week due to a fall and notes persistent LLE stiffness. LE CT done with baker's cyst noted on LLE. Today in clinic, he states he feels better overall. Notes significant improvement in his symptoms with use of Trelegy. Admits to slight cough, sometimes productive of clear phlegm. Notes slight weight gain since last office visit. Notes increase in his LE edema in the interim as well.   Per wife,

## 2023-11-20 NOTE — PROGRESS NOTES
Markell Mattson presents today for   Chief Complaint   Patient presents with    COPD       Is someone accompanying this pt? Wife    Is the patient using any DME equipment during OV? Wheelchair    -DME Company NA    Depression Screenin/21/2023    11:02 AM   PHQ-9 Questionaire   Little interest or pleasure in doing things 0   Feeling down, depressed, or hopeless 0   PHQ-9 Total Score 0       Learning Assessment:    No flowsheet data found. Abuse Screening:         No data to display                Fall Risk    No flowsheet data found. Coordination of Care:    1. Have you been to the ER, urgent care clinic since your last visit? Hospitalized since your last visit? No    2. Have you seen or consulted any other health care providers outside of the [address] [place] [address] since your last visit? Include any pap smears or colon screening. No    Medication list has been update per patient.

## 2023-11-22 ENCOUNTER — HOSPITAL ENCOUNTER (OUTPATIENT)
Facility: HOSPITAL | Age: 83
Discharge: HOME | End: 2023-11-25
Payer: MEDICARE

## 2023-11-22 DIAGNOSIS — J44.9 CHRONIC OBSTRUCTIVE PULMONARY DISEASE, UNSPECIFIED COPD TYPE (HCC): ICD-10-CM

## 2023-11-22 PROCEDURE — 71046 X-RAY EXAM CHEST 2 VIEWS: CPT

## 2023-11-30 ENCOUNTER — CLINICAL DOCUMENTATION (OUTPATIENT)
Age: 83
End: 2023-11-30

## 2023-12-05 ENCOUNTER — TELEPHONE (OUTPATIENT)
Facility: HOSPITAL | Age: 83
End: 2023-12-05

## 2023-12-05 NOTE — TELEPHONE ENCOUNTER
Patient Cancel < 24 HRS Pt is not feeling well and asked to reschedule at the end of the month. I did offer pt appt for the 20th, 21, & 22nd but they did not want any of those dates and preferred to wait until after the new year.

## 2023-12-06 ENCOUNTER — OFFICE VISIT (OUTPATIENT)
Facility: CLINIC | Age: 83
End: 2023-12-06
Payer: MEDICARE

## 2023-12-06 VITALS
OXYGEN SATURATION: 97 % | WEIGHT: 197 LBS | SYSTOLIC BLOOD PRESSURE: 131 MMHG | TEMPERATURE: 97.6 F | BODY MASS INDEX: 29.18 KG/M2 | HEART RATE: 59 BPM | RESPIRATION RATE: 20 BRPM | DIASTOLIC BLOOD PRESSURE: 76 MMHG | HEIGHT: 69 IN

## 2023-12-06 DIAGNOSIS — N18.30 STAGE 3 CHRONIC KIDNEY DISEASE, UNSPECIFIED WHETHER STAGE 3A OR 3B CKD (HCC): ICD-10-CM

## 2023-12-06 DIAGNOSIS — E78.5 HYPERLIPIDEMIA, UNSPECIFIED HYPERLIPIDEMIA TYPE: ICD-10-CM

## 2023-12-06 DIAGNOSIS — I10 ESSENTIAL (PRIMARY) HYPERTENSION: ICD-10-CM

## 2023-12-06 DIAGNOSIS — E07.9 THYROID DISORDER: ICD-10-CM

## 2023-12-06 DIAGNOSIS — E11.65 TYPE 2 DIABETES MELLITUS WITH HYPERGLYCEMIA, WITHOUT LONG-TERM CURRENT USE OF INSULIN (HCC): ICD-10-CM

## 2023-12-06 DIAGNOSIS — R19.4 CHANGE IN BOWEL HABIT: ICD-10-CM

## 2023-12-06 DIAGNOSIS — J44.9 CHRONIC OBSTRUCTIVE PULMONARY DISEASE, UNSPECIFIED COPD TYPE (HCC): ICD-10-CM

## 2023-12-06 DIAGNOSIS — R63.4 WEIGHT LOSS: ICD-10-CM

## 2023-12-06 DIAGNOSIS — G47.00 INSOMNIA, UNSPECIFIED TYPE: Primary | ICD-10-CM

## 2023-12-06 DIAGNOSIS — F39 MOOD DISORDER (HCC): ICD-10-CM

## 2023-12-06 PROCEDURE — 3023F SPIROM DOC REV: CPT | Performed by: FAMILY MEDICINE

## 2023-12-06 PROCEDURE — 3044F HG A1C LEVEL LT 7.0%: CPT | Performed by: FAMILY MEDICINE

## 2023-12-06 PROCEDURE — G8484 FLU IMMUNIZE NO ADMIN: HCPCS | Performed by: FAMILY MEDICINE

## 2023-12-06 PROCEDURE — 3078F DIAST BP <80 MM HG: CPT | Performed by: FAMILY MEDICINE

## 2023-12-06 PROCEDURE — 99215 OFFICE O/P EST HI 40 MIN: CPT | Performed by: FAMILY MEDICINE

## 2023-12-06 PROCEDURE — G8417 CALC BMI ABV UP PARAM F/U: HCPCS | Performed by: FAMILY MEDICINE

## 2023-12-06 PROCEDURE — G8427 DOCREV CUR MEDS BY ELIG CLIN: HCPCS | Performed by: FAMILY MEDICINE

## 2023-12-06 PROCEDURE — 3074F SYST BP LT 130 MM HG: CPT | Performed by: FAMILY MEDICINE

## 2023-12-06 PROCEDURE — 1036F TOBACCO NON-USER: CPT | Performed by: FAMILY MEDICINE

## 2023-12-06 PROCEDURE — 1123F ACP DISCUSS/DSCN MKR DOCD: CPT | Performed by: FAMILY MEDICINE

## 2023-12-06 RX ORDER — AMLODIPINE BESYLATE 10 MG/1
10 TABLET ORAL DAILY
Qty: 30 TABLET | Refills: 5 | Status: SHIPPED | OUTPATIENT
Start: 2023-12-06

## 2023-12-06 RX ORDER — MIRTAZAPINE 7.5 MG/1
7.5 TABLET, FILM COATED ORAL NIGHTLY
Qty: 30 TABLET | Refills: 5 | Status: SHIPPED | OUTPATIENT
Start: 2023-12-06

## 2023-12-06 SDOH — ECONOMIC STABILITY: INCOME INSECURITY: HOW HARD IS IT FOR YOU TO PAY FOR THE VERY BASICS LIKE FOOD, HOUSING, MEDICAL CARE, AND HEATING?: NOT VERY HARD

## 2023-12-06 SDOH — ECONOMIC STABILITY: FOOD INSECURITY: WITHIN THE PAST 12 MONTHS, THE FOOD YOU BOUGHT JUST DIDN'T LAST AND YOU DIDN'T HAVE MONEY TO GET MORE.: NEVER TRUE

## 2023-12-06 SDOH — ECONOMIC STABILITY: FOOD INSECURITY: WITHIN THE PAST 12 MONTHS, YOU WORRIED THAT YOUR FOOD WOULD RUN OUT BEFORE YOU GOT MONEY TO BUY MORE.: NEVER TRUE

## 2023-12-06 ASSESSMENT — PATIENT HEALTH QUESTIONNAIRE - PHQ9
SUM OF ALL RESPONSES TO PHQ9 QUESTIONS 1 & 2: 0
1. LITTLE INTEREST OR PLEASURE IN DOING THINGS: 0
SUM OF ALL RESPONSES TO PHQ QUESTIONS 1-9: 0
SUM OF ALL RESPONSES TO PHQ QUESTIONS 1-9: 0
2. FEELING DOWN, DEPRESSED OR HOPELESS: 0
SUM OF ALL RESPONSES TO PHQ QUESTIONS 1-9: 0
SUM OF ALL RESPONSES TO PHQ QUESTIONS 1-9: 0

## 2023-12-06 NOTE — PROGRESS NOTES
1. \"Have you been to the ER, urgent care clinic since your last visit? Hospitalized since your last visit? \"No    2. \"Have you seen or consulted any other health care providers outside of the [address] [place] [address] since your last visit? \" No    3. For patients aged 43-73: Has the patient had a colonoscopy / FIT/ Cologuard? Not applicable      If the patient is female:    4. For patients aged 43-66: Has the patient had a mammogram within the past 2 years? Not applicable      5. For patients aged 21-65: Has the patient had a pap smear?  Not applicable
diet, weight control and daily exercise discussed, home glucose monitoring emphasized, all medications, side effects and compliance discussed carefully, foot care discussed and Podiatry visits discussed, annual eye examinations at Ophthalmology discussed, glycohemoglobin and other lab monitoring discussed, and long term diabetic complications discussed  radiology results and schedule of future radiology studies reviewed with patient      I have discussed the diagnosis with the patient and the intended plan of care as seen in the above orders. The patient has received an after-visit summary and questions were answered concerning future plans. I have discussed medication, side effects, and warnings with the patient in detail. The patient verbalized understanding and is in agreement with the plan of care. The patient will contact the office with any additional concerns. I spent 40 minutes with this established patient. Massimo Le MD    PLEASE NOTE:   This document has been produced using voice recognition software.  Unrecognized errors in transcription may be present

## 2023-12-13 ENCOUNTER — TELEPHONE (OUTPATIENT)
Facility: HOSPITAL | Age: 83
End: 2023-12-13

## 2023-12-15 ENCOUNTER — TELEPHONE (OUTPATIENT)
Facility: CLINIC | Age: 83
End: 2023-12-15

## 2023-12-15 LAB
A/G RATIO: 1.1 RATIO (ref 1.1–2.6)
ALBUMIN SERPL-MCNC: 4.1 G/DL (ref 3.5–5)
ALP BLD-CCNC: 87 U/L (ref 40–125)
ALT SERPL-CCNC: 24 U/L (ref 5–40)
ANION GAP SERPL CALCULATED.3IONS-SCNC: 14 MMOL/L (ref 3–15)
AST SERPL-CCNC: 35 U/L (ref 10–37)
AVERAGE GLUCOSE: 137 MG/DL (ref 91–123)
BASOPHILS # BLD: 1 % (ref 0–2)
BASOPHILS ABSOLUTE: 0 K/UL (ref 0–0.2)
BILIRUB SERPL-MCNC: 0.6 MG/DL (ref 0.2–1.2)
BUN BLDV-MCNC: 50 MG/DL (ref 6–22)
CALCIUM SERPL-MCNC: 10.6 MG/DL (ref 8.4–10.5)
CHLORIDE BLD-SCNC: 92 MMOL/L (ref 98–110)
CO2: 33 MMOL/L (ref 20–32)
CREAT SERPL-MCNC: 1.5 MG/DL (ref 0.8–1.6)
EOSINOPHIL # BLD: 1 % (ref 0–6)
EOSINOPHILS ABSOLUTE: 0.1 K/UL (ref 0–0.5)
GLOBULIN: 3.7 G/DL (ref 2–4)
GLOMERULAR FILTRATION RATE: 44.5 ML/MIN/1.73 SQ.M.
GLUCOSE: 124 MG/DL (ref 70–99)
HBA1C MFR BLD: 6.4 % (ref 4.8–5.6)
HCT VFR BLD CALC: 44.5 % (ref 37.8–52.2)
HEMOGLOBIN: 15 G/DL (ref 12.6–17.1)
LYMPHOCYTES # BLD: 31 % (ref 20–45)
LYMPHOCYTES ABSOLUTE: 2.5 K/UL (ref 1–4.8)
MCH RBC QN AUTO: 31 PG (ref 26–34)
MCHC RBC AUTO-ENTMCNC: 34 G/DL (ref 31–36)
MCV RBC AUTO: 93 FL (ref 80–95)
MONOCYTES ABSOLUTE: 0.7 K/UL (ref 0.1–1)
MONOCYTES: 8 % (ref 3–12)
NEUTROPHILS ABSOLUTE: 4.9 K/UL (ref 1.8–7.7)
NEUTROPHILS: 59 % (ref 40–75)
PDW BLD-RTO: 13.4 % (ref 10–15.5)
PLATELET # BLD: 184 K/UL (ref 140–440)
PMV BLD AUTO: 9.8 FL (ref 9–13)
POTASSIUM SERPL-SCNC: 2.6 MMOL/L (ref 3.5–5.5)
RBC: 4.77 M/UL (ref 3.8–5.8)
SODIUM BLD-SCNC: 139 MMOL/L (ref 133–145)
TOTAL PROTEIN: 7.8 G/DL (ref 6.2–8.1)
TSH SERPL DL<=0.05 MIU/L-ACNC: 2.35 MCU/ML (ref 0.27–4.2)
WBC: 8.3 K/UL (ref 4–11)

## 2023-12-15 NOTE — TELEPHONE ENCOUNTER
Received a critical lab value from [facility] lab. .. potassium level was at 2.6    Per provider at this office ref patient to the ER for evaluation/treatment. Spoke with patient's wife and advised she needed to take patient for treatment for low potassium to the ED and have rechecked.   She verbalized understanding

## 2024-01-04 ENCOUNTER — OFFICE VISIT (OUTPATIENT)
Facility: CLINIC | Age: 84
End: 2024-01-04
Payer: MEDICARE

## 2024-01-04 VITALS
RESPIRATION RATE: 20 BRPM | HEART RATE: 71 BPM | SYSTOLIC BLOOD PRESSURE: 117 MMHG | HEIGHT: 69 IN | TEMPERATURE: 97.9 F | WEIGHT: 187 LBS | BODY MASS INDEX: 27.7 KG/M2 | OXYGEN SATURATION: 95 % | DIASTOLIC BLOOD PRESSURE: 72 MMHG

## 2024-01-04 DIAGNOSIS — I48.19 OTHER PERSISTENT ATRIAL FIBRILLATION (HCC): ICD-10-CM

## 2024-01-04 DIAGNOSIS — K59.00 CONSTIPATION, UNSPECIFIED CONSTIPATION TYPE: ICD-10-CM

## 2024-01-04 DIAGNOSIS — N18.30 STAGE 3 CHRONIC KIDNEY DISEASE, UNSPECIFIED WHETHER STAGE 3A OR 3B CKD (HCC): ICD-10-CM

## 2024-01-04 DIAGNOSIS — E87.6 HYPOKALEMIA: Primary | ICD-10-CM

## 2024-01-04 DIAGNOSIS — E11.65 TYPE 2 DIABETES MELLITUS WITH HYPERGLYCEMIA, WITHOUT LONG-TERM CURRENT USE OF INSULIN (HCC): ICD-10-CM

## 2024-01-04 DIAGNOSIS — J44.9 CHRONIC OBSTRUCTIVE PULMONARY DISEASE, UNSPECIFIED COPD TYPE (HCC): ICD-10-CM

## 2024-01-04 DIAGNOSIS — E11.21 TYPE 2 DIABETES WITH NEPHROPATHY (HCC): ICD-10-CM

## 2024-01-04 DIAGNOSIS — F39 MOOD DISORDER (HCC): ICD-10-CM

## 2024-01-04 DIAGNOSIS — I77.819 AORTIC ECTASIA, UNSPECIFIED SITE (HCC): ICD-10-CM

## 2024-01-04 DIAGNOSIS — D68.9 COAGULATION DEFECT (HCC): ICD-10-CM

## 2024-01-04 DIAGNOSIS — I50.33 ACUTE ON CHRONIC DIASTOLIC HEART FAILURE (HCC): ICD-10-CM

## 2024-01-04 DIAGNOSIS — G47.00 INSOMNIA, UNSPECIFIED TYPE: ICD-10-CM

## 2024-01-04 DIAGNOSIS — Z09 HOSPITAL DISCHARGE FOLLOW-UP: ICD-10-CM

## 2024-01-04 DIAGNOSIS — I25.708 ATHEROSCLEROSIS OF CORONARY ARTERY BYPASS GRAFT(S), UNSPECIFIED, WITH OTHER FORMS OF ANGINA PECTORIS (HCC): ICD-10-CM

## 2024-01-04 DIAGNOSIS — I27.20 PULMONARY HYPERTENSION (HCC): ICD-10-CM

## 2024-01-04 PROBLEM — R55 NEAR SYNCOPE: Status: ACTIVE | Noted: 2024-01-04

## 2024-01-04 PROBLEM — E83.42 HYPOMAGNESEMIA: Status: ACTIVE | Noted: 2024-01-04

## 2024-01-04 PROBLEM — I50.20 UNSPECIFIED SYSTOLIC (CONGESTIVE) HEART FAILURE (HCC): Status: ACTIVE | Noted: 2024-01-04

## 2024-01-04 PROBLEM — I50.9 ACUTE CHF (HCC): Status: ACTIVE | Noted: 2020-03-21

## 2024-01-04 PROBLEM — E43 SEVERE PROTEIN-CALORIE MALNUTRITION (HCC): Status: ACTIVE | Noted: 2023-12-16

## 2024-01-04 PROBLEM — I48.91 ATRIAL FIBRILLATION (HCC): Status: ACTIVE | Noted: 2020-01-19

## 2024-01-04 PROBLEM — I31.9 DISEASE OF PERICARDIUM: Status: ACTIVE | Noted: 2024-01-04

## 2024-01-04 PROCEDURE — 3078F DIAST BP <80 MM HG: CPT | Performed by: FAMILY MEDICINE

## 2024-01-04 PROCEDURE — 3074F SYST BP LT 130 MM HG: CPT | Performed by: FAMILY MEDICINE

## 2024-01-04 PROCEDURE — G8484 FLU IMMUNIZE NO ADMIN: HCPCS | Performed by: FAMILY MEDICINE

## 2024-01-04 PROCEDURE — 1123F ACP DISCUSS/DSCN MKR DOCD: CPT | Performed by: FAMILY MEDICINE

## 2024-01-04 PROCEDURE — 1111F DSCHRG MED/CURRENT MED MERGE: CPT | Performed by: FAMILY MEDICINE

## 2024-01-04 PROCEDURE — 3023F SPIROM DOC REV: CPT | Performed by: FAMILY MEDICINE

## 2024-01-04 PROCEDURE — 1036F TOBACCO NON-USER: CPT | Performed by: FAMILY MEDICINE

## 2024-01-04 PROCEDURE — G8417 CALC BMI ABV UP PARAM F/U: HCPCS | Performed by: FAMILY MEDICINE

## 2024-01-04 PROCEDURE — 99215 OFFICE O/P EST HI 40 MIN: CPT | Performed by: FAMILY MEDICINE

## 2024-01-04 PROCEDURE — G8427 DOCREV CUR MEDS BY ELIG CLIN: HCPCS | Performed by: FAMILY MEDICINE

## 2024-01-04 RX ORDER — PSEUDOEPHEDRINE HCL 30 MG
100 TABLET ORAL DAILY PRN
Qty: 90 CAPSULE | Refills: 1 | Status: SHIPPED | OUTPATIENT
Start: 2024-01-04

## 2024-01-04 RX ORDER — THIAMINE HCL 100 MG
500 TABLET ORAL DAILY
Qty: 90 TABLET | Refills: 1 | Status: SHIPPED | OUTPATIENT
Start: 2024-01-04

## 2024-01-04 RX ORDER — MIRTAZAPINE 7.5 MG/1
7.5 TABLET, FILM COATED ORAL NIGHTLY
Qty: 30 TABLET | Refills: 5 | Status: SHIPPED | OUTPATIENT
Start: 2024-01-04

## 2024-01-04 ASSESSMENT — PATIENT HEALTH QUESTIONNAIRE - PHQ9
SUM OF ALL RESPONSES TO PHQ QUESTIONS 1-9: 0
2. FEELING DOWN, DEPRESSED OR HOPELESS: 0
SUM OF ALL RESPONSES TO PHQ QUESTIONS 1-9: 0
SUM OF ALL RESPONSES TO PHQ9 QUESTIONS 1 & 2: 0
1. LITTLE INTEREST OR PLEASURE IN DOING THINGS: 0
SUM OF ALL RESPONSES TO PHQ QUESTIONS 1-9: 0
SUM OF ALL RESPONSES TO PHQ QUESTIONS 1-9: 0

## 2024-01-07 PROBLEM — E43 SEVERE PROTEIN-CALORIE MALNUTRITION (HCC): Status: RESOLVED | Noted: 2023-12-16 | Resolved: 2024-01-07

## 2024-01-07 NOTE — PROGRESS NOTES
1. \"Have you been to the ER, urgent care clinic since your last visit?  Hospitalized since your last visit?\"Yes When: [facility]    2. \"Have you seen or consulted any other health care providers outside of the [facility] since your last visit?\" No    3. For patients aged 45-75: Has the patient had a colonoscopy / FIT/ Cologuard? N/A      If the patient is female:    4. For patients aged 40-74: Has the patient had a mammogram within the past 2 years? Not applicable      5. For patients aged 21-65: Has the patient had a pap smear? Not applicable  
patient has received an after-visit summary and questions were answered concerning future plans. I have discussed medication, side effects, and warnings with the patient in detail. The patient verbalized understanding and is in agreement with the plan of care. The patient will contact the office with any additional concerns.    I spent 40 minutes with this established patient.    Felton Oneill MD    PLEASE NOTE:   This document has been produced using voice recognition software. Unrecognized errors in transcription may be present

## 2024-01-08 ENCOUNTER — TELEPHONE (OUTPATIENT)
Facility: CLINIC | Age: 84
End: 2024-01-08

## 2024-01-08 LAB
ANION GAP SERPL CALCULATED.3IONS-SCNC: 13 MMOL/L (ref 3–15)
BUN BLDV-MCNC: 36 MG/DL (ref 6–22)
CALCIUM SERPL-MCNC: 10.1 MG/DL (ref 8.4–10.5)
CHLORIDE BLD-SCNC: 93 MMOL/L (ref 98–110)
CHOLESTEROL/HDL RATIO: 4.6 (ref 0–5)
CHOLESTEROL: 222 MG/DL (ref 110–200)
CO2: 34 MMOL/L (ref 20–32)
CREAT SERPL-MCNC: 1.7 MG/DL (ref 0.8–1.6)
GLOMERULAR FILTRATION RATE: 38.6 ML/MIN/1.73 SQ.M.
GLUCOSE: 123 MG/DL (ref 70–99)
HDLC SERPL-MCNC: 48 MG/DL
LDL CHOLESTEROL CALCULATED: 125 MG/DL (ref 50–99)
LDL/HDL RATIO: 2.6
NON-HDL CHOLESTEROL: 174 MG/DL
POTASSIUM SERPL-SCNC: 2.3 MMOL/L (ref 3.5–5.5)
SODIUM BLD-SCNC: 140 MMOL/L (ref 133–145)
TRIGL SERPL-MCNC: 245 MG/DL (ref 40–149)
VLDLC SERPL CALC-MCNC: 49 MG/DL (ref 8–30)

## 2024-01-08 NOTE — TELEPHONE ENCOUNTER
Received critical potassium level lab.    Provider notified and advised patient to go to ED.    Patient wife was notified and verbalized understanding

## 2024-01-18 ENCOUNTER — TELEPHONE (OUTPATIENT)
Facility: CLINIC | Age: 84
End: 2024-01-18

## 2024-01-18 LAB
A/G RATIO: 1.1 RATIO (ref 1.1–2.6)
ALBUMIN SERPL-MCNC: 3.8 G/DL (ref 3.5–5)
ALP BLD-CCNC: 91 U/L (ref 40–125)
ALT SERPL-CCNC: 23 U/L (ref 5–40)
ANION GAP SERPL CALCULATED.3IONS-SCNC: 11 MMOL/L (ref 3–15)
AST SERPL-CCNC: 26 U/L (ref 10–37)
AVERAGE GLUCOSE: 137 MG/DL (ref 91–123)
BASOPHILS # BLD: 0 % (ref 0–2)
BASOPHILS ABSOLUTE: 0 K/UL (ref 0–0.2)
BILIRUB SERPL-MCNC: 0.4 MG/DL (ref 0.2–1.2)
BUN BLDV-MCNC: 21 MG/DL (ref 6–22)
CALCIUM SERPL-MCNC: 9.8 MG/DL (ref 8.4–10.5)
CHLORIDE BLD-SCNC: 99 MMOL/L (ref 98–110)
CO2: 34 MMOL/L (ref 20–32)
CREAT SERPL-MCNC: 1.3 MG/DL (ref 0.8–1.6)
EOSINOPHIL # BLD: 3 % (ref 0–6)
EOSINOPHILS ABSOLUTE: 0.2 K/UL (ref 0–0.5)
GLOBULIN: 3.5 G/DL (ref 2–4)
GLOMERULAR FILTRATION RATE: 52.4 ML/MIN/1.73 SQ.M.
GLUCOSE: 121 MG/DL (ref 70–99)
HBA1C MFR BLD: 6.4 % (ref 4.8–5.6)
HCT VFR BLD CALC: 38.3 % (ref 37.8–52.2)
HEMOGLOBIN: 12.6 G/DL (ref 12.6–17.1)
LYMPHOCYTES # BLD: 33 % (ref 20–45)
LYMPHOCYTES ABSOLUTE: 2.4 K/UL (ref 1–4.8)
MCH RBC QN AUTO: 31 PG (ref 26–34)
MCHC RBC AUTO-ENTMCNC: 33 G/DL (ref 31–36)
MCV RBC AUTO: 95 FL (ref 80–95)
MONOCYTES ABSOLUTE: 0.5 K/UL (ref 0.1–1)
MONOCYTES: 7 % (ref 3–12)
NEUTROPHILS ABSOLUTE: 4.2 K/UL (ref 1.8–7.7)
NEUTROPHILS: 57 % (ref 40–75)
PDW BLD-RTO: 13.6 % (ref 10–15.5)
PLATELET # BLD: 231 K/UL (ref 140–440)
PMV BLD AUTO: 10.1 FL (ref 9–13)
POTASSIUM SERPL-SCNC: 2.6 MMOL/L (ref 3.5–5.5)
RBC: 4.03 M/UL (ref 3.8–5.8)
SODIUM BLD-SCNC: 144 MMOL/L (ref 133–145)
TOTAL PROTEIN: 7.3 G/DL (ref 6.2–8.1)
TSH SERPL DL<=0.05 MIU/L-ACNC: 1.94 MCU/ML (ref 0.27–4.2)
WBC: 7.4 K/UL (ref 4–11)

## 2024-01-18 NOTE — TELEPHONE ENCOUNTER
Received critical lab ref patient potassium at 2.6 drawn at 948 this am by [facility].    Provider notified and advised patient to return to ED for evaluation/treatment.    Patient's wife notified and verbalized understanding to have patient return to ED

## 2024-01-24 ENCOUNTER — OFFICE VISIT (OUTPATIENT)
Age: 84
End: 2024-01-24
Payer: MEDICARE

## 2024-01-24 ENCOUNTER — OFFICE VISIT (OUTPATIENT)
Facility: CLINIC | Age: 84
End: 2024-01-24

## 2024-01-24 VITALS
HEART RATE: 62 BPM | SYSTOLIC BLOOD PRESSURE: 118 MMHG | TEMPERATURE: 98.7 F | WEIGHT: 205 LBS | HEIGHT: 69 IN | RESPIRATION RATE: 20 BRPM | DIASTOLIC BLOOD PRESSURE: 57 MMHG | OXYGEN SATURATION: 97 % | BODY MASS INDEX: 30.36 KG/M2

## 2024-01-24 VITALS
BODY MASS INDEX: 30.51 KG/M2 | SYSTOLIC BLOOD PRESSURE: 137 MMHG | DIASTOLIC BLOOD PRESSURE: 83 MMHG | WEIGHT: 206 LBS | HEIGHT: 69 IN | HEART RATE: 65 BPM | OXYGEN SATURATION: 96 %

## 2024-01-24 DIAGNOSIS — I95.1 ORTHOSTATIC HYPOTENSION: Primary | ICD-10-CM

## 2024-01-24 DIAGNOSIS — N18.30 STAGE 3 CHRONIC KIDNEY DISEASE, UNSPECIFIED WHETHER STAGE 3A OR 3B CKD (HCC): ICD-10-CM

## 2024-01-24 DIAGNOSIS — Z79.01 LONG TERM (CURRENT) USE OF ANTICOAGULANTS: ICD-10-CM

## 2024-01-24 DIAGNOSIS — G47.00 INSOMNIA, UNSPECIFIED TYPE: ICD-10-CM

## 2024-01-24 DIAGNOSIS — M54.50 CHRONIC MIDLINE LOW BACK PAIN, UNSPECIFIED WHETHER SCIATICA PRESENT: ICD-10-CM

## 2024-01-24 DIAGNOSIS — E66.9 OBESITY (BMI 30-39.9): ICD-10-CM

## 2024-01-24 DIAGNOSIS — I10 ESSENTIAL HYPERTENSION: ICD-10-CM

## 2024-01-24 DIAGNOSIS — E78.5 HYPERLIPIDEMIA, UNSPECIFIED HYPERLIPIDEMIA TYPE: ICD-10-CM

## 2024-01-24 DIAGNOSIS — I25.700 CORONARY ARTERY DISEASE INVOLVING CORONARY BYPASS GRAFT OF NATIVE HEART WITH UNSTABLE ANGINA PECTORIS (HCC): ICD-10-CM

## 2024-01-24 DIAGNOSIS — Z09 HOSPITAL DISCHARGE FOLLOW-UP: ICD-10-CM

## 2024-01-24 DIAGNOSIS — I48.3 TYPICAL ATRIAL FLUTTER (HCC): ICD-10-CM

## 2024-01-24 DIAGNOSIS — E87.6 HYPOKALEMIA: ICD-10-CM

## 2024-01-24 DIAGNOSIS — I50.33 ACUTE ON CHRONIC DIASTOLIC HEART FAILURE (HCC): Primary | ICD-10-CM

## 2024-01-24 DIAGNOSIS — G89.29 CHRONIC MIDLINE LOW BACK PAIN, UNSPECIFIED WHETHER SCIATICA PRESENT: ICD-10-CM

## 2024-01-24 DIAGNOSIS — I50.32 CHF (CONGESTIVE HEART FAILURE), NYHA CLASS III, CHRONIC, DIASTOLIC (HCC): ICD-10-CM

## 2024-01-24 DIAGNOSIS — I10 ESSENTIAL (PRIMARY) HYPERTENSION: ICD-10-CM

## 2024-01-24 DIAGNOSIS — I25.708 ATHEROSCLEROSIS OF CORONARY ARTERY BYPASS GRAFT(S), UNSPECIFIED, WITH OTHER FORMS OF ANGINA PECTORIS (HCC): ICD-10-CM

## 2024-01-24 DIAGNOSIS — J44.9 CHRONIC OBSTRUCTIVE PULMONARY DISEASE, UNSPECIFIED COPD TYPE (HCC): ICD-10-CM

## 2024-01-24 DIAGNOSIS — F39 MOOD DISORDER (HCC): ICD-10-CM

## 2024-01-24 PROCEDURE — G8484 FLU IMMUNIZE NO ADMIN: HCPCS | Performed by: NURSE PRACTITIONER

## 2024-01-24 PROCEDURE — 99214 OFFICE O/P EST MOD 30 MIN: CPT | Performed by: NURSE PRACTITIONER

## 2024-01-24 PROCEDURE — 3079F DIAST BP 80-89 MM HG: CPT | Performed by: NURSE PRACTITIONER

## 2024-01-24 PROCEDURE — 3075F SYST BP GE 130 - 139MM HG: CPT | Performed by: NURSE PRACTITIONER

## 2024-01-24 PROCEDURE — 1036F TOBACCO NON-USER: CPT | Performed by: NURSE PRACTITIONER

## 2024-01-24 PROCEDURE — G8417 CALC BMI ABV UP PARAM F/U: HCPCS | Performed by: NURSE PRACTITIONER

## 2024-01-24 PROCEDURE — 1123F ACP DISCUSS/DSCN MKR DOCD: CPT | Performed by: NURSE PRACTITIONER

## 2024-01-24 PROCEDURE — G8428 CUR MEDS NOT DOCUMENT: HCPCS | Performed by: NURSE PRACTITIONER

## 2024-01-24 RX ORDER — SERTRALINE HYDROCHLORIDE 100 MG/1
150 TABLET, FILM COATED ORAL DAILY
Qty: 90 TABLET | Refills: 1 | Status: SHIPPED | OUTPATIENT
Start: 2024-01-24

## 2024-01-24 RX ORDER — LIDOCAINE 50 MG/G
1 PATCH TOPICAL DAILY
Qty: 90 PATCH | Refills: 0 | Status: SHIPPED | OUTPATIENT
Start: 2024-01-24 | End: 2024-04-23

## 2024-01-24 RX ORDER — MIRTAZAPINE 7.5 MG/1
7.5 TABLET, FILM COATED ORAL NIGHTLY
Qty: 90 TABLET | Refills: 1 | Status: SHIPPED | OUTPATIENT
Start: 2024-01-24

## 2024-01-24 ASSESSMENT — PATIENT HEALTH QUESTIONNAIRE - PHQ9
1. LITTLE INTEREST OR PLEASURE IN DOING THINGS: 0
SUM OF ALL RESPONSES TO PHQ QUESTIONS 1-9: 0
SUM OF ALL RESPONSES TO PHQ QUESTIONS 1-9: 0
2. FEELING DOWN, DEPRESSED OR HOPELESS: 0
SUM OF ALL RESPONSES TO PHQ QUESTIONS 1-9: 0
SUM OF ALL RESPONSES TO PHQ QUESTIONS 1-9: 0
SUM OF ALL RESPONSES TO PHQ9 QUESTIONS 1 & 2: 0

## 2024-01-24 NOTE — PROGRESS NOTES
1. Have you been to the ER, urgent care clinic since your last visit?  Hospitalized since your last visit?     Yes [facility] for potassium was low    2. Have you seen or consulted any other health care providers outside of the [facility] since your last visit?  Include any pap smears or colon screening.      Yes pcp

## 2024-01-24 NOTE — PROGRESS NOTES
1. \"Have you been to the ER, urgent care clinic since your last visit?  Hospitalized since your last visit?\"Yes When: [facility]    2. \"Have you seen or consulted any other health care providers outside of the [facility] since your last visit?\" No    3. For patients aged 45-75: Has the patient had a colonoscopy / FIT/ Cologuard? Not applicable      If the patient is female:    4. For patients aged 40-74: Has the patient had a mammogram within the past 2 years? Not applicable      5. For patients aged 21-65: Has the patient had a pap smear? Not applicable  
supple, no significant adenopathy  Lymphatics - no palpable lymphadenopathy  Chest - decreased air entry noted bilateral lung bases  Heart - systolic murmur 3/6 at 2nd right intercostal space  Abdomen - no rebound tenderness noted  Back exam - limited range of motion, pain with motion noted during exam, tenderness noted midline lumbar spine and paralumbar muscles  Neurological - abnormal neurological exam unchanged from prior examinations  Musculoskeletal - osteoarthritic changes noted in both hands  Extremities - no pedal edema noted, intact peripheral pulses      Results  No results found for this visit on 01/24/24.    ASSESSMENT and PLAN    ICD-10-CM    1. Acute on chronic diastolic heart failure (HCC)  I50.33 Basic Metabolic Panel      2. Insomnia, unspecified type  G47.00 mirtazapine (REMERON) 7.5 MG tablet      3. Mood disorder ([facility])  F39 mirtazapine (REMERON) 7.5 MG tablet     sertraline (ZOLOFT) 100 MG tablet      4. Hypokalemia  E87.6       5. Atherosclerosis of coronary artery bypass graft(s), unspecified, with other forms of angina pectoris ([facility])  I25.708       6. Stage 3 chronic kidney disease, unspecified whether stage 3a or 3b CKD ([facility])  N18.30       7. Hyperlipidemia, unspecified hyperlipidemia type  E78.5       8. Essential (primary) hypertension  I10       9. Chronic obstructive pulmonary disease, unspecified COPD type ([facility])  J44.9       10. Obesity (BMI 30-39.9)  E66.9       11. Chronic midline low back pain, unspecified whether sciatica present  M54.50     G89.29       12. Hospital discharge follow-up  Z09 LA DISCHARGE MEDS RECONCILED W/ CURRENT OUTPATIENT MED LIST      lab results and schedule of future lab studies reviewed with patient  reviewed diet, exercise and weight control  cardiovascular risk and specific lipid/LDL goals reviewed  reviewed medications and side effects in detail  radiology results and schedule of future radiology studies reviewed with patient      I have discussed the

## 2024-01-24 NOTE — PROGRESS NOTES
HISTORY OF PRESENT ILLNESS  Jose Corado Jr. is a 82 y.o.  male.    CHF   The history is provided by the Patient. This is a chronic problem. The problem occurs daily. The problem has been gradually  improving. Associated symptoms include shortness of breath.    Hypertension   The history is provided by the Patient. This is a chronic problem. The problem occurs every several days. The problem has been  gradually improving. Associated symptoms include shortness of breath.    Shortness of Breath   The history is provided by the Patient. This is a chronic problem. The problem occurs intermittently.The problem has been gradually improving. Pertinent negatives include no fever, no ear pain, no neck pain, no cough, no sputum production, no hemoptysis, no wheezing, no PND,  no orthopnea, no vomiting, no rash, no leg swelling and no claudication. Associated medical issues include CAD and heart failure.       Review of Systems    Constitutional:  Negative for chills, diaphoresis, fever, malaise/fatigue and weight loss.    HENT:  Negative for congestion, ear discharge, ear pain, hearing loss, nosebleeds and tinnitus.     Eyes:  Negative for blurred vision.    Respiratory:  Positive for shortness of breath. Negative for cough, hemoptysis, sputum production, wheezing and stridor.     Cardiovascular:  Negative for palpitations, orthopnea, claudication, leg swelling and PND.    Gastrointestinal:  Negative for heartburn, nausea and vomiting.    Musculoskeletal:  Negative for myalgias and neck pain.    Skin:  Negative for itching and rash.    Neurological:  Negative for tingling, tremors, focal weakness, loss of consciousness and weakness.    Psychiatric/Behavioral:  Negative for depression and suicidal ideas.    No family history on file.    Past Medical History:   Diagnosis Date    CAD (coronary artery disease)     Congestive heart failure (HCC)     Diabetes (HCC)     Heart failure (HCC)     History of low potassium

## 2024-01-26 DIAGNOSIS — E87.6 HYPOKALEMIA: Primary | ICD-10-CM

## 2024-01-26 LAB
ANION GAP SERPL CALCULATED.3IONS-SCNC: 10 MMOL/L (ref 3–15)
BUN BLDV-MCNC: 16 MG/DL (ref 6–22)
CALCIUM SERPL-MCNC: 9.8 MG/DL (ref 8.4–10.5)
CHLORIDE BLD-SCNC: 101 MMOL/L (ref 98–110)
CO2: 35 MMOL/L (ref 20–32)
CREAT SERPL-MCNC: 1.2 MG/DL (ref 0.8–1.6)
GLOMERULAR FILTRATION RATE: 57.4 ML/MIN/1.73 SQ.M.
GLUCOSE: 98 MG/DL (ref 70–99)
POTASSIUM SERPL-SCNC: 3.3 MMOL/L (ref 3.5–5.5)
SODIUM BLD-SCNC: 146 MMOL/L (ref 133–145)

## 2024-02-05 ENCOUNTER — OFFICE VISIT (OUTPATIENT)
Facility: CLINIC | Age: 84
End: 2024-02-05
Payer: MEDICARE

## 2024-02-05 VITALS
TEMPERATURE: 98 F | DIASTOLIC BLOOD PRESSURE: 65 MMHG | RESPIRATION RATE: 20 BRPM | HEART RATE: 62 BPM | SYSTOLIC BLOOD PRESSURE: 132 MMHG | BODY MASS INDEX: 31.25 KG/M2 | OXYGEN SATURATION: 96 % | WEIGHT: 211 LBS | HEIGHT: 69 IN

## 2024-02-05 DIAGNOSIS — F39 MOOD DISORDER (HCC): ICD-10-CM

## 2024-02-05 DIAGNOSIS — I10 ESSENTIAL (PRIMARY) HYPERTENSION: ICD-10-CM

## 2024-02-05 DIAGNOSIS — N18.30 STAGE 3 CHRONIC KIDNEY DISEASE, UNSPECIFIED WHETHER STAGE 3A OR 3B CKD (HCC): ICD-10-CM

## 2024-02-05 DIAGNOSIS — I25.708 ATHEROSCLEROSIS OF CORONARY ARTERY BYPASS GRAFT(S), UNSPECIFIED, WITH OTHER FORMS OF ANGINA PECTORIS (HCC): ICD-10-CM

## 2024-02-05 DIAGNOSIS — I50.33 ACUTE ON CHRONIC DIASTOLIC HEART FAILURE (HCC): ICD-10-CM

## 2024-02-05 DIAGNOSIS — E87.6 HYPOKALEMIA: Primary | ICD-10-CM

## 2024-02-05 PROCEDURE — 99214 OFFICE O/P EST MOD 30 MIN: CPT | Performed by: FAMILY MEDICINE

## 2024-02-05 PROCEDURE — G8417 CALC BMI ABV UP PARAM F/U: HCPCS | Performed by: FAMILY MEDICINE

## 2024-02-05 PROCEDURE — 3075F SYST BP GE 130 - 139MM HG: CPT | Performed by: FAMILY MEDICINE

## 2024-02-05 PROCEDURE — 3078F DIAST BP <80 MM HG: CPT | Performed by: FAMILY MEDICINE

## 2024-02-05 PROCEDURE — 1123F ACP DISCUSS/DSCN MKR DOCD: CPT | Performed by: FAMILY MEDICINE

## 2024-02-05 PROCEDURE — G8484 FLU IMMUNIZE NO ADMIN: HCPCS | Performed by: FAMILY MEDICINE

## 2024-02-05 PROCEDURE — 1036F TOBACCO NON-USER: CPT | Performed by: FAMILY MEDICINE

## 2024-02-05 PROCEDURE — G8427 DOCREV CUR MEDS BY ELIG CLIN: HCPCS | Performed by: FAMILY MEDICINE

## 2024-02-05 ASSESSMENT — PATIENT HEALTH QUESTIONNAIRE - PHQ9
SUM OF ALL RESPONSES TO PHQ QUESTIONS 1-9: 0
SUM OF ALL RESPONSES TO PHQ9 QUESTIONS 1 & 2: 0
SUM OF ALL RESPONSES TO PHQ QUESTIONS 1-9: 0
2. FEELING DOWN, DEPRESSED OR HOPELESS: 0
SUM OF ALL RESPONSES TO PHQ QUESTIONS 1-9: 0
SUM OF ALL RESPONSES TO PHQ QUESTIONS 1-9: 0
1. LITTLE INTEREST OR PLEASURE IN DOING THINGS: 0

## 2024-02-05 NOTE — PROGRESS NOTES
\"Have you been to the ER, urgent care clinic since your last visit?  Hospitalized since your last visit?\"    NO    “Have you seen or consulted any other health care providers outside of [facility] since your last visit?”    NO         
FAILURE MONITORING DEVICE performed by Mirna Velasco MD at [facility] CARDIAC CATH LAB        Medications  Current Outpatient Medications   Medication Sig Dispense Refill    mirtazapine (REMERON) 7.5 MG tablet Take 1 tablet by mouth nightly 90 tablet 1    sertraline (ZOLOFT) 100 MG tablet Take 1.5 tablets by mouth daily 90 tablet 1    lidocaine (LIDODERM) 5 % Place 1 patch onto the skin daily 12 hours on, 12 hours off. 90 patch 0    diclofenac sodium (VOLTAREN) 1 % GEL Apply 2 g topically 4 times daily 350 g 0    Magnesium 500 MG TABS Take 500 mg by mouth daily 90 tablet 1    docusate (COLACE, DULCOLAX) 100 MG CAPS Take 100 mg by mouth daily as needed (constipation) 90 capsule 1    amLODIPine (NORVASC) 10 MG tablet Take 1 tablet by mouth daily 30 tablet 5    vitamin D (CHOLECALCIFEROL) 25 MCG (1000 UT) TABS tablet Take 1 tablet by mouth 2 times daily 90 tablet 1    albuterol (PROVENTIL) (2.5 MG/3ML) 0.083% nebulizer solution Take 3 mLs by nebulization every 4 hours as needed for Shortness of Breath or Wheezing 120 each 5    fluticasone-umeclidin-vilant (TRELEGY ELLIPTA) 200-62.5-25 MCG/ACT AEPB inhaler Inhale 1 puff into the lungs daily 3 each 5    albuterol sulfate HFA (PROVENTIL;VENTOLIN;PROAIR) 108 (90 Base) MCG/ACT inhaler Inhale 1 puff into the lungs every 4 hours as needed for Shortness of Breath or Wheezing 18 g 5    triamcinolone (KENALOG) 0.025 % cream Apply topically 2 times daily. 80 g 1    glucose-vitamin C 4-6 GM-MG CHEW chewable tablet Take 16 g by mouth as needed      lidocaine (LIDODERM) 5 % Place 1 patch onto the skin daily as needed      methocarbamol (ROBAXIN) 500 MG tablet May cause drowsiness.      pantoprazole (PROTONIX) 40 MG tablet Take 1 tablet by mouth daily 90 tablet 1    metOLazone (ZAROXOLYN) 5 MG tablet Take 1 tablet by mouth daily (Patient taking differently: Take 1 tablet by mouth daily 1 tablet every other day as needed) 30 tablet 1    pravastatin (PRAVACHOL) 20 MG tablet Take 1

## 2024-02-19 ENCOUNTER — TELEPHONE (OUTPATIENT)
Facility: CLINIC | Age: 84
End: 2024-02-19

## 2024-02-19 DIAGNOSIS — E87.6 HYPOKALEMIA: ICD-10-CM

## 2024-02-19 LAB
ANION GAP SERPL CALCULATED.3IONS-SCNC: 9 MMOL/L (ref 3–15)
BUN BLDV-MCNC: 18 MG/DL (ref 6–22)
CALCIUM SERPL-MCNC: 9.7 MG/DL (ref 8.4–10.5)
CHLORIDE BLD-SCNC: 102 MMOL/L (ref 98–110)
CO2: 32 MMOL/L (ref 20–32)
CREAT SERPL-MCNC: 1.2 MG/DL (ref 0.8–1.6)
GLOMERULAR FILTRATION RATE: 57.4 ML/MIN/1.73 SQ.M.
GLUCOSE: 97 MG/DL (ref 70–99)
POTASSIUM SERPL-SCNC: 3 MMOL/L (ref 3.5–5.5)
SODIUM BLD-SCNC: 143 MMOL/L (ref 133–145)

## 2024-02-19 RX ORDER — POTASSIUM CHLORIDE 20 MEQ/1
20 TABLET, EXTENDED RELEASE ORAL 3 TIMES DAILY
Qty: 180 TABLET | Refills: 1 | Status: SHIPPED | OUTPATIENT
Start: 2024-02-19

## 2024-02-19 NOTE — TELEPHONE ENCOUNTER
Maria from [facility] is calling to ask if a speech therapy can go to this patient's home for incoginetive therapy. Please contact back to inform [phone]

## 2024-02-21 ENCOUNTER — OFFICE VISIT (OUTPATIENT)
Facility: CLINIC | Age: 84
End: 2024-02-21

## 2024-02-21 VITALS
SYSTOLIC BLOOD PRESSURE: 135 MMHG | BODY MASS INDEX: 31.1 KG/M2 | TEMPERATURE: 97.8 F | OXYGEN SATURATION: 100 % | HEIGHT: 69 IN | DIASTOLIC BLOOD PRESSURE: 84 MMHG | WEIGHT: 210 LBS | HEART RATE: 65 BPM | RESPIRATION RATE: 20 BRPM

## 2024-02-21 DIAGNOSIS — I25.708 ATHEROSCLEROSIS OF CORONARY ARTERY BYPASS GRAFT(S), UNSPECIFIED, WITH OTHER FORMS OF ANGINA PECTORIS (HCC): ICD-10-CM

## 2024-02-21 DIAGNOSIS — E66.9 OBESITY (BMI 30-39.9): ICD-10-CM

## 2024-02-21 DIAGNOSIS — F39 MOOD DISORDER (HCC): ICD-10-CM

## 2024-02-21 DIAGNOSIS — I50.33 ACUTE ON CHRONIC DIASTOLIC HEART FAILURE (HCC): ICD-10-CM

## 2024-02-21 DIAGNOSIS — J44.9 CHRONIC OBSTRUCTIVE PULMONARY DISEASE, UNSPECIFIED COPD TYPE (HCC): ICD-10-CM

## 2024-02-21 DIAGNOSIS — E87.6 HYPOKALEMIA: Primary | ICD-10-CM

## 2024-02-21 DIAGNOSIS — I67.9 CEREBROVASCULAR DISEASE: ICD-10-CM

## 2024-02-21 DIAGNOSIS — I10 ESSENTIAL (PRIMARY) HYPERTENSION: ICD-10-CM

## 2024-02-21 ASSESSMENT — PATIENT HEALTH QUESTIONNAIRE - PHQ9
1. LITTLE INTEREST OR PLEASURE IN DOING THINGS: 0
2. FEELING DOWN, DEPRESSED OR HOPELESS: 0
SUM OF ALL RESPONSES TO PHQ QUESTIONS 1-9: 0
SUM OF ALL RESPONSES TO PHQ9 QUESTIONS 1 & 2: 0

## 2024-02-21 NOTE — PROGRESS NOTES
HPI  Jose Corado Jr comes in for follow-up care.  Hypokalemia: Patient has a history of chronic hypokalemia.  He is on diuretic medications due to CHF.  Most recent potassium level was 3.0.  Will have him increase his potassium to 20 mill equivalents 3 times a day.  Will recheck labs within a week.  CHF: Patient has a history of chronic systolic CHF.  Patient has a CardioMEMS implant in place.  He has been monitoring his weight.  He is followed up with a cardiologist.  Patient is on furosemide, aspirin, metolazone.  Patient will continue with the current medication.  He will follow-up with his specialist.  CAD: Patient has coronary artery disease.  Denies chest pain, shortness of breath or diaphoresis.  Patient is on nitroglycerin to use as needed.  He is on aspirin, Eliquis, pravastatin.  Patient will continue with the current treatment plan.  Mood disorder: Patient has history of mood disorder.  Patient is on Zoloft and Remeron.  Patient will continue with the current treatment plan.  Dyslipidemia: Patient has dyslipidemia.  Patient is on pravastatin.  Continue current treatment plan.  He will take a diet low in polysaturated fats.  CVA: Patient has a history of CVA.  He has been doing supportive care.  Would like evaluation by speech therapy for speech and cognition.  Referral is placed.  May need to have cognitive therapy done.  COPD: Patient has history of COPD.  He is on Trelegy Ellipta.  He uses albuterol as needed for wheeze and shortness of breath.  He will continue current treatment plan.      Past Medical History  Past Medical History:   Diagnosis Date    CAD (coronary artery disease)     Congestive heart failure (HCC)     Diabetes (HCC)     Heart failure (HCC)     History of low potassium     Hypertension     Stroke (HCC)        Surgical History  Past Surgical History:   Procedure Laterality Date    EYE SURGERY      MAy 2 and May 17 cataract sugery in both eyes    [place] UNLISTED PROCEDURE CARDIAC

## 2024-03-05 ENCOUNTER — OFFICE VISIT (OUTPATIENT)
Facility: CLINIC | Age: 84
End: 2024-03-05
Payer: MEDICARE

## 2024-03-05 VITALS
HEART RATE: 66 BPM | RESPIRATION RATE: 20 BRPM | DIASTOLIC BLOOD PRESSURE: 67 MMHG | HEIGHT: 69 IN | BODY MASS INDEX: 31.55 KG/M2 | WEIGHT: 213 LBS | TEMPERATURE: 97.8 F | SYSTOLIC BLOOD PRESSURE: 120 MMHG | OXYGEN SATURATION: 92 %

## 2024-03-05 DIAGNOSIS — G89.29 CHRONIC PAIN OF LEFT KNEE: ICD-10-CM

## 2024-03-05 DIAGNOSIS — G47.30 SLEEP APNEA, UNSPECIFIED TYPE: ICD-10-CM

## 2024-03-05 DIAGNOSIS — E87.6 HYPOKALEMIA: Primary | ICD-10-CM

## 2024-03-05 DIAGNOSIS — M25.562 CHRONIC PAIN OF LEFT KNEE: ICD-10-CM

## 2024-03-05 DIAGNOSIS — I50.32 CHRONIC DIASTOLIC CONGESTIVE HEART FAILURE (HCC): ICD-10-CM

## 2024-03-05 PROBLEM — E87.8 ELECTROLYTE ABNORMALITY: Status: ACTIVE | Noted: 2024-01-18

## 2024-03-05 PROCEDURE — G8427 DOCREV CUR MEDS BY ELIG CLIN: HCPCS | Performed by: FAMILY MEDICINE

## 2024-03-05 PROCEDURE — G8417 CALC BMI ABV UP PARAM F/U: HCPCS | Performed by: FAMILY MEDICINE

## 2024-03-05 PROCEDURE — 3074F SYST BP LT 130 MM HG: CPT | Performed by: FAMILY MEDICINE

## 2024-03-05 PROCEDURE — 1123F ACP DISCUSS/DSCN MKR DOCD: CPT | Performed by: FAMILY MEDICINE

## 2024-03-05 PROCEDURE — G8484 FLU IMMUNIZE NO ADMIN: HCPCS | Performed by: FAMILY MEDICINE

## 2024-03-05 PROCEDURE — 99214 OFFICE O/P EST MOD 30 MIN: CPT | Performed by: FAMILY MEDICINE

## 2024-03-05 PROCEDURE — 3078F DIAST BP <80 MM HG: CPT | Performed by: FAMILY MEDICINE

## 2024-03-05 PROCEDURE — 1036F TOBACCO NON-USER: CPT | Performed by: FAMILY MEDICINE

## 2024-03-05 ASSESSMENT — PATIENT HEALTH QUESTIONNAIRE - PHQ9
SUM OF ALL RESPONSES TO PHQ9 QUESTIONS 1 & 2: 0
SUM OF ALL RESPONSES TO PHQ QUESTIONS 1-9: 0
SUM OF ALL RESPONSES TO PHQ QUESTIONS 1-9: 0
2. FEELING DOWN, DEPRESSED OR HOPELESS: 0
1. LITTLE INTEREST OR PLEASURE IN DOING THINGS: 0
SUM OF ALL RESPONSES TO PHQ QUESTIONS 1-9: 0
SUM OF ALL RESPONSES TO PHQ QUESTIONS 1-9: 0

## 2024-03-05 NOTE — PROGRESS NOTES
HPI  Jose ASHBY Mak Fry comes in for follow-up care.  He is accompanied by his wife.  Hypokalemia: Patient has hypokalemia.  He is on potassium 20 mill equivalents 3 times daily.  Hypokalemia likely due to use of diuretics.  We have increased his potassium to 3 times daily and his potassium is up to 3.3.  Will recheck this in a month.  Continue current management plan.  CHF: Patient has a history of chronic diastolic CHF.  Currently he is on diuretic medication.  He is on metolazone and furosemide.  He is also on aspirin.  We will continue with this medication.  Chronic knee pain: Patient has chronic left knee pain.  He has had x-rays done in the past that showed degenerative disease.  He has been doing supportive care.  Pain is noted with range of motion including flexion and extension.  We discussed supportive care measures.  He can take Tylenol for pain as needed.  He has diclofenac gel to apply.  I will refer him to the orthopedist for follow-up care.  Sleep apnea: Patient has sleep apnea.  He has been seen in the past by the pulmonologist.  He was told he would need to get a CPAP machine.  [facility]  [facility].  Referral will be placed.      Past Medical History  Past Medical History:   Diagnosis Date    CAD (coronary artery disease)     Congestive heart failure (HCC)     Diabetes (HCC)     Heart failure (HCC)     History of low potassium     Hypertension     Stroke (HCC)        Surgical History  Past Surgical History:   Procedure Laterality Date    EYE SURGERY      MAy 2 and May 17 cataract sugery in both eyes    [place] UNLISTED PROCEDURE CARDIAC SURGERY  2007    bypass    TCAT IMPL WRLS P-ART PRS SNR L-T HEMODYN MNTR Right 2/10/2020    (Cardio MEMS) IMPLANT HEART FAILURE MONITORING DEVICE performed by Mirna Velasco MD at [facility] CARDIAC CATH LAB        Medications  Current Outpatient Medications   Medication Sig

## 2024-03-21 ENCOUNTER — TELEPHONE (OUTPATIENT)
Facility: CLINIC | Age: 84
End: 2024-03-21

## 2024-03-21 NOTE — TELEPHONE ENCOUNTER
[facility] is [facility]. However, the patient has [employer] Medicare as primary and  as his secondary insurance. We need a [facility] referral number in order to [facility] a [facility]. Called pt to discuss, no answer, left VM to call office back.

## 2024-04-08 ENCOUNTER — OFFICE VISIT (OUTPATIENT)
Facility: CLINIC | Age: 84
End: 2024-04-08
Payer: MEDICARE

## 2024-04-08 VITALS
HEIGHT: 69 IN | BODY MASS INDEX: 31.1 KG/M2 | RESPIRATION RATE: 18 BRPM | WEIGHT: 210 LBS | OXYGEN SATURATION: 97 % | TEMPERATURE: 98 F | DIASTOLIC BLOOD PRESSURE: 78 MMHG | SYSTOLIC BLOOD PRESSURE: 117 MMHG | HEART RATE: 75 BPM

## 2024-04-08 DIAGNOSIS — E66.9 OBESITY (BMI 30-39.9): ICD-10-CM

## 2024-04-08 DIAGNOSIS — F39 MOOD DISORDER (HCC): ICD-10-CM

## 2024-04-08 DIAGNOSIS — I50.32 CHRONIC DIASTOLIC CONGESTIVE HEART FAILURE (HCC): ICD-10-CM

## 2024-04-08 DIAGNOSIS — I10 ESSENTIAL (PRIMARY) HYPERTENSION: ICD-10-CM

## 2024-04-08 DIAGNOSIS — E78.5 HYPERLIPIDEMIA, UNSPECIFIED HYPERLIPIDEMIA TYPE: ICD-10-CM

## 2024-04-08 DIAGNOSIS — G47.30 SLEEP APNEA, UNSPECIFIED TYPE: Primary | ICD-10-CM

## 2024-04-08 DIAGNOSIS — N18.30 STAGE 3 CHRONIC KIDNEY DISEASE, UNSPECIFIED WHETHER STAGE 3A OR 3B CKD (HCC): ICD-10-CM

## 2024-04-08 PROCEDURE — 3074F SYST BP LT 130 MM HG: CPT | Performed by: FAMILY MEDICINE

## 2024-04-08 PROCEDURE — 3078F DIAST BP <80 MM HG: CPT | Performed by: FAMILY MEDICINE

## 2024-04-08 PROCEDURE — 1036F TOBACCO NON-USER: CPT | Performed by: FAMILY MEDICINE

## 2024-04-08 PROCEDURE — 99214 OFFICE O/P EST MOD 30 MIN: CPT | Performed by: FAMILY MEDICINE

## 2024-04-08 PROCEDURE — G8427 DOCREV CUR MEDS BY ELIG CLIN: HCPCS | Performed by: FAMILY MEDICINE

## 2024-04-08 PROCEDURE — 1123F ACP DISCUSS/DSCN MKR DOCD: CPT | Performed by: FAMILY MEDICINE

## 2024-04-08 PROCEDURE — G8417 CALC BMI ABV UP PARAM F/U: HCPCS | Performed by: FAMILY MEDICINE

## 2024-04-08 ASSESSMENT — PATIENT HEALTH QUESTIONNAIRE - PHQ9
SUM OF ALL RESPONSES TO PHQ9 QUESTIONS 1 & 2: 0
1. LITTLE INTEREST OR PLEASURE IN DOING THINGS: NOT AT ALL
SUM OF ALL RESPONSES TO PHQ QUESTIONS 1-9: 0
2. FEELING DOWN, DEPRESSED OR HOPELESS: NOT AT ALL

## 2024-04-08 NOTE — PROGRESS NOTES
Food in the Last Year: Never true     Ran Out of Food in the Last Year: Never true   Transportation Needs: Unknown (12/6/2023)    PRAPARE - Transportation     Lack of Transportation (Medical): Not on file     Lack of Transportation (Non-Medical): No   Physical Activity: Inactive (9/21/2023)    Exercise Vital Sign     Days of Exercise per Week: 0 days     Minutes of Exercise per Session: 0 min   Stress: Not on file   Social Connections: Not on file   Intimate Partner Violence: Not on file   Housing Stability: Unknown (12/6/2023)    Housing Stability Vital Sign     Unable to Pay for Housing in the Last Year: Not on file     Number of Places Lived in the Last Year: Not on file     Unstable Housing in the Last Year: No       Review of Systems  Review of Systems - Review of all systems is negative except as noted above in the HPI.    Vital Signs  /78   Pulse 75   Temp 98 °F (36.7 °C) (Oral)   Resp 18   Ht 1.753 m (5' 9\")   Wt 95.3 kg (210 lb)   SpO2 97%   BMI 31.01 kg/m²       Physical Exam  Physical Examination: General appearance - oriented to person, place, and time, acyanotic, in no respiratory distress, and chronically ill appearing  Mental status - affect appropriate to mood  Lymphatics - no palpable lymphadenopathy  Chest - decreased air entry noted bilateral lung bases  Heart - systolic murmur 3/6 at lower left sternal border  Abdomen - no rebound tenderness noted  Back exam - limited range of motion  Neurological - abnormal neurological exam unchanged from prior examinations  Musculoskeletal - osteoarthritic changes noted in both hands  Extremities - pedal edema 1 +, intact peripheral pulses        Results  No results found for this visit on 04/08/24.    ASSESSMENT and PLAN    ICD-10-CM    1. Sleep apnea, unspecified type  G47.30       2. Chronic diastolic congestive heart failure (HCC)  I50.32       3. Mood disorder (HCC)  F39       4. Essential (primary) hypertension  I10       5. Obesity (BMI

## 2024-04-15 ENCOUNTER — APPOINTMENT (OUTPATIENT)
Facility: HOSPITAL | Age: 84
End: 2024-04-15
Payer: MEDICARE

## 2024-04-15 ENCOUNTER — HOSPITAL ENCOUNTER (INPATIENT)
Facility: HOSPITAL | Age: 84
LOS: 4 days | Discharge: HOME HEALTH | End: 2024-04-19
Attending: EMERGENCY MEDICINE | Admitting: INTERNAL MEDICINE
Payer: MEDICARE

## 2024-04-15 DIAGNOSIS — E83.42 HYPOMAGNESEMIA: ICD-10-CM

## 2024-04-15 DIAGNOSIS — E87.8 ELECTROLYTE ABNORMALITY: ICD-10-CM

## 2024-04-15 DIAGNOSIS — M10.9 ACUTE GOUT OF RIGHT WRIST, UNSPECIFIED CAUSE: Primary | ICD-10-CM

## 2024-04-15 DIAGNOSIS — E87.6 HYPOKALEMIA: ICD-10-CM

## 2024-04-15 DIAGNOSIS — R79.89 ELEVATED TROPONIN: ICD-10-CM

## 2024-04-15 DIAGNOSIS — I50.32 CHRONIC DIASTOLIC CONGESTIVE HEART FAILURE (HCC): ICD-10-CM

## 2024-04-15 DIAGNOSIS — N17.9 AKI (ACUTE KIDNEY INJURY) (HCC): ICD-10-CM

## 2024-04-15 DIAGNOSIS — R00.0 WIDE-COMPLEX TACHYCARDIA: ICD-10-CM

## 2024-04-15 DIAGNOSIS — I48.21 PERMANENT ATRIAL FIBRILLATION (HCC): ICD-10-CM

## 2024-04-15 LAB
ALBUMIN SERPL-MCNC: 3.4 G/DL (ref 3.4–5)
ALBUMIN/GLOB SERPL: 0.8 (ref 0.8–1.7)
ALP SERPL-CCNC: 82 U/L (ref 45–117)
ALT SERPL-CCNC: 23 U/L (ref 16–61)
ANION GAP SERPL CALC-SCNC: 6 MMOL/L (ref 3–18)
APPEARANCE FLD: ABNORMAL
AST SERPL-CCNC: 34 U/L (ref 10–38)
BASOPHILS # BLD: 0 K/UL (ref 0–0.1)
BASOPHILS NFR BLD: 0 % (ref 0–2)
BILIRUB SERPL-MCNC: 0.8 MG/DL (ref 0.2–1)
BUN SERPL-MCNC: 45 MG/DL (ref 7–18)
BUN/CREAT SERPL: 23 (ref 12–20)
CALCIUM SERPL-MCNC: 9 MG/DL (ref 8.5–10.1)
CHLORIDE SERPL-SCNC: 97 MMOL/L (ref 100–111)
CO2 SERPL-SCNC: 36 MMOL/L (ref 21–32)
COLOR FLD: ABNORMAL
CREAT SERPL-MCNC: 1.92 MG/DL (ref 0.6–1.3)
DIFFERENTIAL METHOD BLD: ABNORMAL
EKG ATRIAL RATE: 108 BPM
EKG DIAGNOSIS: NORMAL
EKG DIAGNOSIS: NORMAL
EKG Q-T INTERVAL: 400 MS
EKG Q-T INTERVAL: 448 MS
EKG QRS DURATION: 116 MS
EKG QRS DURATION: 138 MS
EKG QTC CALCULATION (BAZETT): 450 MS
EKG QTC CALCULATION (BAZETT): 583 MS
EKG R AXIS: -63 DEGREES
EKG R AXIS: 92 DEGREES
EKG T AXIS: 263 DEGREES
EKG T AXIS: 97 DEGREES
EKG VENTRICULAR RATE: 102 BPM
EKG VENTRICULAR RATE: 76 BPM
EOSINOPHIL # BLD: 0.1 K/UL (ref 0–0.4)
EOSINOPHIL NFR BLD: 1 % (ref 0–5)
EOSINOPHIL NFR FLD MANUAL: 0 %
ERYTHROCYTE [DISTWIDTH] IN BLOOD BY AUTOMATED COUNT: 13.4 % (ref 11.6–14.5)
GLOBULIN SER CALC-MCNC: 4.4 G/DL (ref 2–4)
GLUCOSE SERPL-MCNC: 117 MG/DL (ref 74–99)
HCT VFR BLD AUTO: 41.1 % (ref 36–48)
HGB BLD-MCNC: 13.9 G/DL (ref 13–16)
IMM GRANULOCYTES # BLD AUTO: 0 K/UL (ref 0–0.04)
IMM GRANULOCYTES NFR BLD AUTO: 0 % (ref 0–0.5)
LACTATE BLD-SCNC: 1.06 MMOL/L (ref 0.4–2)
LYMPHOCYTES # BLD: 2.2 K/UL (ref 0.9–3.6)
LYMPHOCYTES NFR BLD: 23 % (ref 21–52)
LYMPHOCYTES NFR FLD: 20 %
MAGNESIUM SERPL-MCNC: 1.3 MG/DL (ref 1.6–2.6)
MCH RBC QN AUTO: 32.3 PG (ref 24–34)
MCHC RBC AUTO-ENTMCNC: 33.8 G/DL (ref 31–37)
MCV RBC AUTO: 95.6 FL (ref 78–100)
MONOCYTES # BLD: 0.9 K/UL (ref 0.05–1.2)
MONOCYTES NFR BLD: 10 % (ref 3–10)
MONOCYTES NFR FLD: 25 %
NEUTROPHILS NFR FLD: 55 % (ref 0–25)
NEUTS BAND # FLD: 0 %
NEUTS SEG # BLD: 6.2 K/UL (ref 1.8–8)
NEUTS SEG NFR BLD: 66 % (ref 40–73)
NRBC # BLD: 0 K/UL (ref 0–0.01)
NRBC BLD-RTO: 0 PER 100 WBC
NUC CELL # FLD: ABNORMAL /CU MM
PLATELET # BLD AUTO: 185 K/UL (ref 135–420)
PMV BLD AUTO: 10.6 FL (ref 9.2–11.8)
POTASSIUM SERPL-SCNC: 2.6 MMOL/L (ref 3.5–5.5)
PROCALCITONIN SERPL-MCNC: 0.1 NG/ML
PROT SERPL-MCNC: 7.8 G/DL (ref 6.4–8.2)
RBC # BLD AUTO: 4.3 M/UL (ref 4.35–5.65)
RBC # FLD: ABNORMAL /CU MM
SODIUM SERPL-SCNC: 139 MMOL/L (ref 136–145)
SPECIMEN SOURCE FLD: ABNORMAL
TROPONIN I SERPL HS-MCNC: 300 NG/L (ref 0–78)
TROPONIN I SERPL HS-MCNC: 310 NG/L (ref 0–78)
URATE SERPL-MCNC: 10.3 MG/DL (ref 2.6–7.2)
WBC # BLD AUTO: 9.5 K/UL (ref 4.6–13.2)

## 2024-04-15 PROCEDURE — 83605 ASSAY OF LACTIC ACID: CPT

## 2024-04-15 PROCEDURE — 80053 COMPREHEN METABOLIC PANEL: CPT

## 2024-04-15 PROCEDURE — 83735 ASSAY OF MAGNESIUM: CPT

## 2024-04-15 PROCEDURE — 96376 TX/PRO/DX INJ SAME DRUG ADON: CPT

## 2024-04-15 PROCEDURE — 6370000000 HC RX 637 (ALT 250 FOR IP): Performed by: PHYSICIAN ASSISTANT

## 2024-04-15 PROCEDURE — 84145 PROCALCITONIN (PCT): CPT

## 2024-04-15 PROCEDURE — 84484 ASSAY OF TROPONIN QUANT: CPT

## 2024-04-15 PROCEDURE — 99285 EMERGENCY DEPT VISIT HI MDM: CPT

## 2024-04-15 PROCEDURE — 93005 ELECTROCARDIOGRAM TRACING: CPT | Performed by: EMERGENCY MEDICINE

## 2024-04-15 PROCEDURE — 93010 ELECTROCARDIOGRAM REPORT: CPT | Performed by: INTERNAL MEDICINE

## 2024-04-15 PROCEDURE — 1100000003 HC PRIVATE W/ TELEMETRY

## 2024-04-15 PROCEDURE — 0R9N3ZZ DRAINAGE OF RIGHT WRIST JOINT, PERCUTANEOUS APPROACH: ICD-10-PCS | Performed by: INTERNAL MEDICINE

## 2024-04-15 PROCEDURE — 84550 ASSAY OF BLOOD/URIC ACID: CPT

## 2024-04-15 PROCEDURE — 6360000002 HC RX W HCPCS: Performed by: PHYSICIAN ASSISTANT

## 2024-04-15 PROCEDURE — 89051 BODY FLUID CELL COUNT: CPT

## 2024-04-15 PROCEDURE — 85025 COMPLETE CBC W/AUTO DIFF WBC: CPT

## 2024-04-15 PROCEDURE — 96365 THER/PROPH/DIAG IV INF INIT: CPT

## 2024-04-15 PROCEDURE — 73130 X-RAY EXAM OF HAND: CPT

## 2024-04-15 PROCEDURE — 2500000003 HC RX 250 WO HCPCS: Performed by: PHYSICIAN ASSISTANT

## 2024-04-15 RX ORDER — MAGNESIUM SULFATE 1 G/100ML
1000 INJECTION INTRAVENOUS
Status: COMPLETED | OUTPATIENT
Start: 2024-04-15 | End: 2024-04-15

## 2024-04-15 RX ORDER — POTASSIUM CHLORIDE 7.45 MG/ML
10 INJECTION INTRAVENOUS
Status: DISPENSED | OUTPATIENT
Start: 2024-04-15 | End: 2024-04-15

## 2024-04-15 RX ORDER — LIDOCAINE HYDROCHLORIDE 10 MG/ML
10 INJECTION, SOLUTION EPIDURAL; INFILTRATION; INTRACAUDAL; PERINEURAL ONCE
Status: COMPLETED | OUTPATIENT
Start: 2024-04-15 | End: 2024-04-15

## 2024-04-15 RX ADMIN — POTASSIUM CHLORIDE 10 MEQ: 7.46 INJECTION, SOLUTION INTRAVENOUS at 19:21

## 2024-04-15 RX ADMIN — POTASSIUM BICARBONATE 40 MEQ: 782 TABLET, EFFERVESCENT ORAL at 16:48

## 2024-04-15 RX ADMIN — POTASSIUM CHLORIDE 10 MEQ: 7.46 INJECTION, SOLUTION INTRAVENOUS at 21:44

## 2024-04-15 RX ADMIN — POTASSIUM CHLORIDE 10 MEQ: 7.46 INJECTION, SOLUTION INTRAVENOUS at 20:26

## 2024-04-15 RX ADMIN — MAGNESIUM SULFATE HEPTAHYDRATE 1000 MG: 1 INJECTION, SOLUTION INTRAVENOUS at 17:40

## 2024-04-15 RX ADMIN — MAGNESIUM SULFATE HEPTAHYDRATE 1000 MG: 1 INJECTION, SOLUTION INTRAVENOUS at 16:51

## 2024-04-15 RX ADMIN — MAGNESIUM SULFATE HEPTAHYDRATE 1000 MG: 1 INJECTION, SOLUTION INTRAVENOUS at 18:31

## 2024-04-15 RX ADMIN — LIDOCAINE HYDROCHLORIDE 10 ML: 10 INJECTION, SOLUTION EPIDURAL; INFILTRATION; INTRACAUDAL at 13:43

## 2024-04-15 ASSESSMENT — PAIN SCALES - GENERAL: PAINLEVEL_OUTOF10: 6

## 2024-04-15 ASSESSMENT — ENCOUNTER SYMPTOMS
ABDOMINAL PAIN: 0
RHINORRHEA: 0
COLOR CHANGE: 0
DIARRHEA: 0
SHORTNESS OF BREATH: 0
VOMITING: 0

## 2024-04-15 ASSESSMENT — LIFESTYLE VARIABLES
HOW MANY STANDARD DRINKS CONTAINING ALCOHOL DO YOU HAVE ON A TYPICAL DAY: 1 OR 2
HOW OFTEN DO YOU HAVE A DRINK CONTAINING ALCOHOL: MONTHLY OR LESS

## 2024-04-15 ASSESSMENT — PAIN - FUNCTIONAL ASSESSMENT: PAIN_FUNCTIONAL_ASSESSMENT: 0-10

## 2024-04-15 NOTE — ED NOTES
Called access center spoke to LITO Bowens advised her that the patient is now willing to go [facility]  Gogo willis and she will document the info

## 2024-04-15 NOTE — PROGRESS NOTES
Called again admission for this patient.  Patient noted to have presented for wrist pain and a likely gout exacerbation.  Patient went into a wide-complex tachycardia during his stay in the ED.  Patient now is in normal sinus rhythm per the ER physician assistant.  Patient's arrhythmia likely related to his profound electrolyte abnormalities which has been an issue for him in the past.  Advised repletion of electrolytes specifically the potassium and magnesium, he has historically been a unable to tolerate a beta-blocker due to bradycardia.  Patient may benefit from a calcium channel blocker versus amiodarone if a calcium channel blocker does not work.    Patient will remain under the care and management of [facility] ER Physician at this time until Patient physically arrives at [facility]; advice and recommendations given at this time do not indicate that Hospitalist service will be ordering initial tests or stabilizing treatments (changes in Vitals and Critical Lab Results are neither directly nor emergently reported to [facility] Hospitalist service by Nursing Staff or any other mechanism, but presumably are reported to [facility] ER Physicians who must manage the Patient).    General Recommendations for Patients include (except where contraindicated): obtaining CBC and CMP daily while in [facility] ER.  Recommend obtaining PT/INR for Patients on Warfarin or who have INR >3.0 in any case.  Recommend that any Patient with an increased FiO2 requirement have Pulse Oximetry ordered.   Recommend that any Patient placed on Cardiac Monitoring have this ordered for the maximum of 72 hours.  Recommend obtaining Vitals at least q8hrs or at the rate for Level of Care Requested, whichever is more frequent.  Recommend H&H be performed 1 hour after final Unit of PRBCs ordered is Transfused to Patient receiving PRBCs.  Recommend that any Patient that is ordered Single-Dose Antibiotics by signed out to be reviewed daily by other [facility] ER Physicians, as they are

## 2024-04-15 NOTE — ED TRIAGE NOTES
Pt reports right hand redness, swelling and pain for 2 days. He states that it has felt warm to the touch since yesterday. Hx DM

## 2024-04-15 NOTE — ED PROVIDER NOTES
EMERGENCY DEPARTMENT HISTORY AND PHYSICAL EXAM      Date: 4/15/2024  Patient Name: Jose Corado Jr    History of Presenting Illness     Chief Complaint   Patient presents with    Hand Pain    Rash       History (Context): Jose Corado Jr is a 83 y.o. male with significant past medical history of DM ,htn, CAD, CVA, CHF presents ambulatory to the ED today. Patient reports right hand pain and swelling x 2 days. Denies known trauma or injury.  Denies numbness, tingling, weakness. Pt reports distant history of gout.  Patient has not taken anything for symptoms.  Denies fever, chills.  Wife at bedside and reports noticing swelling and erythema which is why she brought him into the ED. Denies CP, SOB, dizziness.  Patient with increased pain with movement.  Has not taken anything for symptoms      PCP: Felton Oneill MD    Current Facility-Administered Medications   Medication Dose Route Frequency Provider Last Rate Last Admin    potassium chloride 10 mEq/100 mL IVPB (Peripheral Line)  10 mEq IntraVENous Q1H Sheridan Moscoso  mL/hr at 04/15/24 1921 10 mEq at 04/15/24 1921     Current Outpatient Medications   Medication Sig Dispense Refill    potassium chloride (KLOR-CON M) 20 MEQ extended release tablet Take 1 tablet by mouth 3 times daily 180 tablet 1    mirtazapine (REMERON) 7.5 MG tablet Take 1 tablet by mouth nightly 90 tablet 1    sertraline (ZOLOFT) 100 MG tablet Take 1.5 tablets by mouth daily 90 tablet 1    lidocaine (LIDODERM) 5 % Place 1 patch onto the skin daily 12 hours on, 12 hours off. 90 patch 0    diclofenac sodium (VOLTAREN) 1 % GEL Apply 2 g topically 4 times daily 350 g 0    Magnesium 500 MG TABS Take 500 mg by mouth daily 90 tablet 1    docusate (COLACE, DULCOLAX) 100 MG CAPS Take 100 mg by mouth daily as needed (constipation) 90 capsule 1    amLODIPine (NORVASC) 10 MG tablet Take 1 tablet by mouth daily 30 tablet 5    vitamin D (CHOLECALCIFEROL) 25 MCG (1000 UT) TABS tablet Take 1

## 2024-04-15 NOTE — ED NOTES
LITO Dent from access center called back and sttd she reached out to the [facility] transfer center and she was told all the [facility] facilities are full and the only facilities that have openings are [place] and [facility] in [place]    AMERICA Lo relayed the information to the patient and his family and per his daughter she is a NP @ [employer] and she is going to see what strings she can pull    I asked LITO Dent to please keep the case open and as soon as we get information back from the daughter we will let them know and she sttd ok

## 2024-04-16 PROBLEM — M10.9 ACUTE GOUT OF RIGHT WRIST: Status: ACTIVE | Noted: 2024-04-16

## 2024-04-16 LAB
ANION GAP SERPL CALC-SCNC: 11 MMOL/L (ref 3–18)
BUN SERPL-MCNC: 41 MG/DL (ref 7–18)
BUN/CREAT SERPL: 23 (ref 12–20)
CALCIUM SERPL-MCNC: 8.8 MG/DL (ref 8.5–10.1)
CHLORIDE SERPL-SCNC: 97 MMOL/L (ref 100–111)
CO2 SERPL-SCNC: 28 MMOL/L (ref 21–32)
CREAT SERPL-MCNC: 1.75 MG/DL (ref 0.6–1.3)
GLUCOSE SERPL-MCNC: 168 MG/DL (ref 74–99)
MAGNESIUM SERPL-MCNC: 2.2 MG/DL (ref 1.6–2.6)
NT PRO BNP: 4817 PG/ML (ref 0–1800)
POTASSIUM SERPL-SCNC: 2.7 MMOL/L (ref 3.5–5.5)
POTASSIUM SERPL-SCNC: 2.7 MMOL/L (ref 3.5–5.5)
SODIUM SERPL-SCNC: 136 MMOL/L (ref 136–145)
TROPONIN I SERPL HS-MCNC: 185 NG/L (ref 0–78)

## 2024-04-16 PROCEDURE — 94761 N-INVAS EAR/PLS OXIMETRY MLT: CPT

## 2024-04-16 PROCEDURE — 6360000002 HC RX W HCPCS: Performed by: INTERNAL MEDICINE

## 2024-04-16 PROCEDURE — 83735 ASSAY OF MAGNESIUM: CPT

## 2024-04-16 PROCEDURE — 84132 ASSAY OF SERUM POTASSIUM: CPT

## 2024-04-16 PROCEDURE — 1100000003 HC PRIVATE W/ TELEMETRY

## 2024-04-16 PROCEDURE — 83880 ASSAY OF NATRIURETIC PEPTIDE: CPT

## 2024-04-16 PROCEDURE — 6370000000 HC RX 637 (ALT 250 FOR IP): Performed by: INTERNAL MEDICINE

## 2024-04-16 PROCEDURE — 2580000003 HC RX 258: Performed by: INTERNAL MEDICINE

## 2024-04-16 PROCEDURE — 94640 AIRWAY INHALATION TREATMENT: CPT

## 2024-04-16 PROCEDURE — 99223 1ST HOSP IP/OBS HIGH 75: CPT | Performed by: SPECIALIST

## 2024-04-16 PROCEDURE — 84484 ASSAY OF TROPONIN QUANT: CPT

## 2024-04-16 PROCEDURE — 99223 1ST HOSP IP/OBS HIGH 75: CPT | Performed by: INTERNAL MEDICINE

## 2024-04-16 PROCEDURE — 80048 BASIC METABOLIC PNL TOTAL CA: CPT

## 2024-04-16 PROCEDURE — 36415 COLL VENOUS BLD VENIPUNCTURE: CPT

## 2024-04-16 RX ORDER — SODIUM CHLORIDE 9 MG/ML
INJECTION, SOLUTION INTRAVENOUS PRN
Status: DISCONTINUED | OUTPATIENT
Start: 2024-04-16 | End: 2024-04-19 | Stop reason: HOSPADM

## 2024-04-16 RX ORDER — MAGNESIUM SULFATE IN WATER 40 MG/ML
2000 INJECTION, SOLUTION INTRAVENOUS PRN
Status: DISCONTINUED | OUTPATIENT
Start: 2024-04-16 | End: 2024-04-19 | Stop reason: HOSPADM

## 2024-04-16 RX ORDER — POTASSIUM CHLORIDE 20 MEQ/1
40 TABLET, EXTENDED RELEASE ORAL
Status: COMPLETED | OUTPATIENT
Start: 2024-04-16 | End: 2024-04-17

## 2024-04-16 RX ORDER — ACETAMINOPHEN 325 MG/1
650 TABLET ORAL EVERY 6 HOURS PRN
Status: DISCONTINUED | OUTPATIENT
Start: 2024-04-16 | End: 2024-04-19 | Stop reason: HOSPADM

## 2024-04-16 RX ORDER — SODIUM CHLORIDE 9 MG/ML
INJECTION, SOLUTION INTRAVENOUS CONTINUOUS
Status: DISCONTINUED | OUTPATIENT
Start: 2024-04-16 | End: 2024-04-16

## 2024-04-16 RX ORDER — ONDANSETRON 2 MG/ML
4 INJECTION INTRAMUSCULAR; INTRAVENOUS EVERY 6 HOURS PRN
Status: DISCONTINUED | OUTPATIENT
Start: 2024-04-16 | End: 2024-04-19 | Stop reason: HOSPADM

## 2024-04-16 RX ORDER — ACETAMINOPHEN 650 MG/1
650 SUPPOSITORY RECTAL EVERY 6 HOURS PRN
Status: DISCONTINUED | OUTPATIENT
Start: 2024-04-16 | End: 2024-04-19 | Stop reason: HOSPADM

## 2024-04-16 RX ORDER — SODIUM CHLORIDE 0.9 % (FLUSH) 0.9 %
5-40 SYRINGE (ML) INJECTION PRN
Status: DISCONTINUED | OUTPATIENT
Start: 2024-04-16 | End: 2024-04-19 | Stop reason: HOSPADM

## 2024-04-16 RX ORDER — POLYETHYLENE GLYCOL 3350 17 G/17G
17 POWDER, FOR SOLUTION ORAL DAILY PRN
Status: DISCONTINUED | OUTPATIENT
Start: 2024-04-16 | End: 2024-04-19 | Stop reason: HOSPADM

## 2024-04-16 RX ORDER — ARFORMOTEROL TARTRATE 15 UG/2ML
15 SOLUTION RESPIRATORY (INHALATION)
Status: DISCONTINUED | OUTPATIENT
Start: 2024-04-16 | End: 2024-04-19 | Stop reason: HOSPADM

## 2024-04-16 RX ORDER — POTASSIUM CHLORIDE 7.45 MG/ML
10 INJECTION INTRAVENOUS PRN
Status: DISCONTINUED | OUTPATIENT
Start: 2024-04-16 | End: 2024-04-19 | Stop reason: HOSPADM

## 2024-04-16 RX ORDER — SODIUM CHLORIDE 0.9 % (FLUSH) 0.9 %
5-40 SYRINGE (ML) INJECTION EVERY 12 HOURS SCHEDULED
Status: DISCONTINUED | OUTPATIENT
Start: 2024-04-16 | End: 2024-04-19 | Stop reason: HOSPADM

## 2024-04-16 RX ORDER — TRAMADOL HYDROCHLORIDE 50 MG/1
50 TABLET ORAL EVERY 6 HOURS PRN
Status: DISCONTINUED | OUTPATIENT
Start: 2024-04-16 | End: 2024-04-19 | Stop reason: HOSPADM

## 2024-04-16 RX ORDER — AMLODIPINE BESYLATE 10 MG/1
10 TABLET ORAL DAILY
Status: DISCONTINUED | OUTPATIENT
Start: 2024-04-16 | End: 2024-04-16

## 2024-04-16 RX ORDER — POTASSIUM CHLORIDE 20 MEQ/1
40 TABLET, EXTENDED RELEASE ORAL PRN
Status: DISCONTINUED | OUTPATIENT
Start: 2024-04-16 | End: 2024-04-19 | Stop reason: HOSPADM

## 2024-04-16 RX ORDER — PRAVASTATIN SODIUM 20 MG
20 TABLET ORAL DAILY
Status: DISCONTINUED | OUTPATIENT
Start: 2024-04-16 | End: 2024-04-19 | Stop reason: HOSPADM

## 2024-04-16 RX ORDER — DOCUSATE SODIUM 100 MG/1
100 CAPSULE, LIQUID FILLED ORAL DAILY PRN
Status: DISCONTINUED | OUTPATIENT
Start: 2024-04-16 | End: 2024-04-19 | Stop reason: HOSPADM

## 2024-04-16 RX ORDER — BUDESONIDE 0.5 MG/2ML
0.5 INHALANT ORAL
Status: DISCONTINUED | OUTPATIENT
Start: 2024-04-16 | End: 2024-04-19 | Stop reason: HOSPADM

## 2024-04-16 RX ORDER — ASPIRIN 81 MG/1
81 TABLET ORAL DAILY
Status: DISCONTINUED | OUTPATIENT
Start: 2024-04-16 | End: 2024-04-19 | Stop reason: HOSPADM

## 2024-04-16 RX ORDER — SODIUM CHLORIDE AND POTASSIUM CHLORIDE 150; 900 MG/100ML; MG/100ML
INJECTION, SOLUTION INTRAVENOUS CONTINUOUS
Status: DISPENSED | OUTPATIENT
Start: 2024-04-16 | End: 2024-04-17

## 2024-04-16 RX ORDER — NITROGLYCERIN 0.4 MG/1
0.4 TABLET SUBLINGUAL EVERY 5 MIN PRN
Status: DISCONTINUED | OUTPATIENT
Start: 2024-04-16 | End: 2024-04-19 | Stop reason: HOSPADM

## 2024-04-16 RX ORDER — ALBUTEROL SULFATE 2.5 MG/3ML
2.5 SOLUTION RESPIRATORY (INHALATION) EVERY 4 HOURS PRN
Status: DISCONTINUED | OUTPATIENT
Start: 2024-04-16 | End: 2024-04-19 | Stop reason: HOSPADM

## 2024-04-16 RX ORDER — ONDANSETRON 4 MG/1
4 TABLET, ORALLY DISINTEGRATING ORAL EVERY 8 HOURS PRN
Status: DISCONTINUED | OUTPATIENT
Start: 2024-04-16 | End: 2024-04-19 | Stop reason: HOSPADM

## 2024-04-16 RX ADMIN — IPRATROPIUM BROMIDE 0.5 MG: 0.5 SOLUTION RESPIRATORY (INHALATION) at 14:21

## 2024-04-16 RX ADMIN — POTASSIUM CHLORIDE 10 MEQ: 7.46 INJECTION, SOLUTION INTRAVENOUS at 12:29

## 2024-04-16 RX ADMIN — SERTRALINE HYDROCHLORIDE 150 MG: 50 TABLET ORAL at 09:24

## 2024-04-16 RX ADMIN — ARFORMOTEROL TARTRATE 15 MCG: 15 SOLUTION RESPIRATORY (INHALATION) at 20:02

## 2024-04-16 RX ADMIN — TRAMADOL HYDROCHLORIDE 50 MG: 50 TABLET ORAL at 02:09

## 2024-04-16 RX ADMIN — POTASSIUM CHLORIDE 10 MEQ: 7.46 INJECTION, SOLUTION INTRAVENOUS at 06:36

## 2024-04-16 RX ADMIN — POTASSIUM CHLORIDE 10 MEQ: 7.46 INJECTION, SOLUTION INTRAVENOUS at 09:26

## 2024-04-16 RX ADMIN — BUDESONIDE 500 MCG: 0.5 INHALANT RESPIRATORY (INHALATION) at 20:02

## 2024-04-16 RX ADMIN — SODIUM CHLORIDE: 9 INJECTION, SOLUTION INTRAVENOUS at 02:10

## 2024-04-16 RX ADMIN — POTASSIUM CHLORIDE 10 MEQ: 7.46 INJECTION, SOLUTION INTRAVENOUS at 13:30

## 2024-04-16 RX ADMIN — POTASSIUM CHLORIDE AND SODIUM CHLORIDE: 900; 150 INJECTION, SOLUTION INTRAVENOUS at 22:23

## 2024-04-16 RX ADMIN — POTASSIUM CHLORIDE 10 MEQ: 7.46 INJECTION, SOLUTION INTRAVENOUS at 15:38

## 2024-04-16 RX ADMIN — POTASSIUM CHLORIDE 10 MEQ: 7.46 INJECTION, SOLUTION INTRAVENOUS at 11:31

## 2024-04-16 RX ADMIN — SODIUM CHLORIDE: 9 INJECTION, SOLUTION INTRAVENOUS at 20:54

## 2024-04-16 RX ADMIN — APIXABAN 2.5 MG: 2.5 TABLET, FILM COATED ORAL at 20:50

## 2024-04-16 RX ADMIN — ASPIRIN 81 MG: 81 TABLET, COATED ORAL at 09:23

## 2024-04-16 RX ADMIN — SODIUM CHLORIDE, PRESERVATIVE FREE 10 ML: 5 INJECTION INTRAVENOUS at 09:29

## 2024-04-16 RX ADMIN — SODIUM CHLORIDE, PRESERVATIVE FREE 10 ML: 5 INJECTION INTRAVENOUS at 20:50

## 2024-04-16 RX ADMIN — IPRATROPIUM BROMIDE 0.5 MG: 0.5 SOLUTION RESPIRATORY (INHALATION) at 20:02

## 2024-04-16 RX ADMIN — POTASSIUM CHLORIDE 40 MEQ: 1500 TABLET, EXTENDED RELEASE ORAL at 22:23

## 2024-04-16 RX ADMIN — TRAMADOL HYDROCHLORIDE 50 MG: 50 TABLET ORAL at 15:38

## 2024-04-16 RX ADMIN — Medication 500 MG: at 09:24

## 2024-04-16 RX ADMIN — APIXABAN 2.5 MG: 2.5 TABLET, FILM COATED ORAL at 09:23

## 2024-04-16 RX ADMIN — TRAMADOL HYDROCHLORIDE 50 MG: 50 TABLET ORAL at 09:24

## 2024-04-16 RX ADMIN — METOPROLOL TARTRATE 12.5 MG: 25 TABLET, FILM COATED ORAL at 09:23

## 2024-04-16 RX ADMIN — PRAVASTATIN SODIUM 20 MG: 20 TABLET ORAL at 09:24

## 2024-04-16 RX ADMIN — APIXABAN 2.5 MG: 2.5 TABLET, FILM COATED ORAL at 02:09

## 2024-04-16 ASSESSMENT — PAIN SCALES - GENERAL
PAINLEVEL_OUTOF10: 5
PAINLEVEL_OUTOF10: 0
PAINLEVEL_OUTOF10: 0
PAINLEVEL_OUTOF10: 6
PAINLEVEL_OUTOF10: 6
PAINLEVEL_OUTOF10: 0
PAINLEVEL_OUTOF10: 8
PAINLEVEL_OUTOF10: 7
PAINLEVEL_OUTOF10: 6
PAINLEVEL_OUTOF10: 6
PAINLEVEL_OUTOF10: 0

## 2024-04-16 ASSESSMENT — PAIN DESCRIPTION - LOCATION
LOCATION: WRIST

## 2024-04-16 ASSESSMENT — PAIN DESCRIPTION - PAIN TYPE
TYPE: ACUTE PAIN

## 2024-04-16 ASSESSMENT — PAIN DESCRIPTION - ORIENTATION
ORIENTATION: RIGHT

## 2024-04-16 ASSESSMENT — PAIN DESCRIPTION - ONSET
ONSET: ON-GOING

## 2024-04-16 ASSESSMENT — PAIN DESCRIPTION - DESCRIPTORS
DESCRIPTORS: ACHING
DESCRIPTORS: SHARP;ACHING

## 2024-04-16 ASSESSMENT — PAIN DESCRIPTION - FREQUENCY
FREQUENCY: CONTINUOUS

## 2024-04-16 ASSESSMENT — PAIN - FUNCTIONAL ASSESSMENT
PAIN_FUNCTIONAL_ASSESSMENT: PREVENTS OR INTERFERES SOME ACTIVE ACTIVITIES AND ADLS

## 2024-04-16 NOTE — CARE COORDINATION
04/16/24 1228   Service Assessment   Patient Orientation Alert and Oriented   Cognition Alert   History Provided By Patient   Primary Caregiver Spouse   Support Systems Spouse/Significant Other   PCP Verified by CM Yes   Last Visit to PCP Within last 3 months   Prior Functional Level Assistance with the following:   Current Functional Level Assistance with the following:   Can patient return to prior living arrangement Unknown at present   Ability to make needs known: Good   Family able to assist with home care needs: Yes   Would you like for me to discuss the discharge plan with any other family members/significant others, and if so, who? Yes  (Wife)   Financial Resources Medicare   Community Resources None   Social/Functional History   Lives With Spouse   Type of Home House   Bathroom Shower/Tub Tub/Shower unit   Bathroom Toilet Standard   Bathroom Accessibility Accessible   Receives Help From Family   ADL Assistance Needs assistance   Bath Modified independent   Dressing Modified independent   Grooming Modified independent    Feeding Modified independent    Toileting Independent   Homemaking Assistance Needs assistance   Ambulation Assistance Needs assistance   Active  No   Patient's  Info Family to transport   Mode of Transportation Car   Occupation Retired   Discharge Planning   Type of Residence House   Living Arrangements Spouse/Significant Other   Current Services Prior To Admission None   Potential Assistance Needed N/A   Type of Home Care Services None   Patient expects to be discharged to: House   One/Two Story Residence One story   Services At/After Discharge   Transition of Care Consult (CM Consult) N/A    Resource Information Provided? No   Confirm Follow Up Transport Family   Condition of Participation: Discharge Planning   The Plan for Transition of Care is related to the following treatment goals: Plan is for patient to return home with spouse.        04/16/24 1228   Service

## 2024-04-16 NOTE — PROGRESS NOTES
[facility] Hospitalist Group  Progress Note    Patient: Jose Corado Jr Age: 83 y.o. : 1940 MR#: [id number] SSN: [id number]      Subjective/24-hour events:     Chart reviewed.  Patient admitted earlier this morning by night coverage after presenting with right wrist pain.  Joint was aspirated in the ED with improvement in symptoms.  He was subsequently found to have a wide-complex tachycardia and labs showed multiple electrolyte derangements.    Assessment:   Acute gout flare suspected  WCT  LOCO  Elevated troponin  Electrolyte abnormality: Hypokalemia, hypomagnesemia  Advanced age    Plan:   Await fluid studies but continue empiric treatment for gout.  Ortho recommendations appreciated.  Maintain telemetry monitoring.  Cardiology evaluation pending.  Recommendations appreciated in advance.  Monitor electrolytes, replace as necessary.  SSI.  Further orders per admitting hospitalist.  Will follow up formally in AM.      Case discussed with:  []Patient  []Family  [] Nursing  []Case Management  DVT Prophylaxis:  []Lovenox  []Hep SQ  []SCDs  []Coumadin   []On Heparin gtt []PO anticoagulant    Objective:   VS: /68   Pulse 71   Temp 96.8 °F (36 °C) (Oral)   Resp 18   Ht 1.753 m (5' 9\")   Wt 96.2 kg (212 lb)   SpO2 93%   BMI 31.31 kg/m²      Tmax/24hrs: Temp (24hrs), Av.7 °F (36.5 °C), Min:96.8 °F (36 °C), Max:98.3 °F (36.8 °C)  No intake or output data in the 24 hours ending 24 0935      Current Facility-Administered Medications   Medication Dose Route Frequency    albuterol (PROVENTIL) (2.5 MG/3ML) 0.083% nebulizer solution 2.5 mg  2.5 mg Nebulization Q4H PRN    apixaban (ELIQUIS) tablet 2.5 mg  2.5 mg Oral BID    aspirin EC tablet 81 mg  81 mg Oral Daily    docusate sodium (COLACE) capsule 100 mg  100 mg Oral Daily PRN    Magnesium Oxide (MAGNESIUM-OXIDE) 500 mg  500 mg Oral Daily    nitroGLYCERIN (NITROSTAT) SL tablet 0.4 mg  0.4 mg SubLINGual Q5 Min PRN

## 2024-04-16 NOTE — H&P
Hospitalist Admission History and Physical    NAME:  Jose Corado Jr   :   1940   MRN:   [id number]     PCP:  Felton Oneill MD  Date/Time:  2024 12:59 AM  Subjective:   CHIEF COMPLAINT:  right wrist pain    HISTORY OF PRESENT ILLNESS:     Jose is a 83 y.o.   male  with history of gout who presented to [facility] ED with above-stated complaints.  The reason he has been admitted is because he had runs of wide-complex tachycardia.  Also he was noted to have significant electrolyte derangements while at [facility] emergency room.    Patient reported that he had been having right wrist pain for 3 days.  He denied nausea, vomiting, diarrhea .  He does report that he has had lower extremity edema for which he had taken a lot of of his as needed metolazone in addition to his Lasix.    In the emergency room he had arthrocentesis of the right wrist which showed about 20 K nucleated cells.  The rest of the fluid analysis studies are pending.  He does report significant improvement in his right wrist pain post arthrocentesis.  He does not have a white count, no fever.        Past Medical History:   Diagnosis Date    CAD (coronary artery disease)     Congestive heart failure (HCC)     Diabetes (HCC)     Heart failure (HCC)     History of low potassium     Hypertension     Stroke (HCC)         Past Surgical History:   Procedure Laterality Date    EYE SURGERY      MAy 2 and May 17 cataract sugery in both eyes    [place] UNLISTED PROCEDURE CARDIAC SURGERY      bypass    TCAT IMPL WRLS P-ART PRS SNR L-T HEMODYN MNTR Right 2/10/2020    (Cardio MEMS) IMPLANT HEART FAILURE MONITORING DEVICE performed by Mirna Velasco MD at [facility] CARDIAC CATH [facility]       Social History     Tobacco Use    Smoking status: Never     Passive exposure: Never    Smokeless tobacco: Never   Substance Use Topics    Alcohol use: No        No family history on file.     Allergies   Allergen Reactions    Penicillin G Swelling    Niacin       itching          Prior to Admission Medications   Prescriptions Last Dose Informant Patient Reported? Taking?   Acetaminophen 325 MG CAPS   Yes No   Sig: Take 2 tablets by mouth 4 times daily as needed   Magnesium 500 MG TABS   No No   Sig: Take 500 mg by mouth daily   albuterol (PROVENTIL) (2.5 MG/3ML) 0.083% nebulizer solution   No No   Sig: Take 3 mLs by nebulization every 4 hours as needed for Shortness of Breath or Wheezing   albuterol sulfate HFA (PROVENTIL;VENTOLIN;PROAIR) 108 (90 Base) MCG/ACT inhaler   No No   Sig: Inhale 1 puff into the lungs every 4 hours as needed for Shortness of Breath or Wheezing   amLODIPine (NORVASC) 10 MG tablet   No No   Sig: Take 1 tablet by mouth daily   ammonium lactate (LAC-HYDRIN) 12 % lotion   Yes No   Sig: Apply 12 Bottles topically 2 times daily as needed   apixaban (ELIQUIS) 5 MG TABS tablet   Yes No   Sig: Take 0.5 tablets by mouth 2 times daily   aspirin 81 MG EC tablet   Yes No   Sig: Take 1 tablet by mouth daily   diclofenac sodium (VOLTAREN) 1 % GEL   No No   Sig: Apply 2 g topically 4 times daily   docusate (COLACE, DULCOLAX) 100 MG CAPS   No No   Sig: Take 100 mg by mouth daily as needed (constipation)   ferrous sulfate (FE TABS 325) 325 (65 Fe) MG EC tablet   Yes No   Sig: Take 1 tablet by mouth daily   fluticasone-umeclidin-vilant (TRELEGY ELLIPTA) 200-62.5-25 MCG/ACT AEPB inhaler   No No   Sig: Inhale 1 puff into the lungs daily   furosemide (LASIX) 40 MG tablet   Yes No   Sig: Take 1 tablet by mouth daily   glucose-vitamin C 4-6 GM-MG CHEW chewable tablet   Yes No   Sig: Take 16 g by mouth as needed   lidocaine (LIDODERM) 5 %   No No   Sig: Place 1 patch onto the skin daily 12 hours on, 12 hours off.   metOLazone (ZAROXOLYN) 5 MG tablet   No No   Sig: Take 1 tablet by mouth daily   Patient taking differently: Take 1 tablet by mouth daily 1 tablet every other day as needed   mirtazapine (REMERON) 7.5 MG tablet   No No   Sig: Take 1 tablet by mouth nightly

## 2024-04-16 NOTE — CONSULTS
[facility] - Consult Note    Cardiology consultation request from Dr. Melendez for evaluation and management/treatment of AF RVR, WCT     Date of  Admission: 4/15/2024 12:40 PM   Primary Care Physician:  Felton Oneill MD    Attending Cardiologist: Dr. Rodriguez      Assessment:     -Gout. Right wrist pain, s/p arthrocentesis.   -Chronic AF, on Eliquis as outpatient.   -Prolonged Qtc 583 ms in setting of Hypokalemia and Hypomagnesia, improved.    -Chronic HFpEF, s/p cardiomems. Echo 08/2023, EF 50-55%, severe concentric hypertrophy   -Elevated troponin, flat and not c/w primary ACS, no s/s concerning for angina. Chronically elevated at baseline.   -LVH  -CAD, s/p remote CABG. S/p LHC 2020  Patent LIMA-LAD, SVG- to OM2 and diagonal 2 artery. mid RCA occlusion with left to right collaterals. Fixed defect on nuclear stress test 08/2023.   -Pulmonary hypertension, s/p RHC 2020 high pulmonary capillary wedge   -HTN, on amlodipine        Primary cardiologist is Dr. Kaylynn Salgado      Plan:     -Echo pending.   -Recommend aggressive electrolyte replacement, Recommend maintaining K at 4.0 and Mg at 2.0.   -Repeat troponin for completeness. Continue ASA, statin.   -Stroke prophylaxis with Eliquis 2.5 mg BID (Scr and age)   -AF rates controlled at present, currently on lopressor 12.5 mg BID.   -Further recommendations pending results.      History of Present Illness:     This is a 83 y.o. male admitted for Hypokalemia [E87.6]  Hypomagnesemia [E83.42]  Wide-complex tachycardia [R00.0]  Elevated troponin [R79.89]  LOCO (acute kidney injury) (HCC) [N17.9]  Acute gout of right wrist, unspecified cause [M10.9].    Patient complains of: right wrist pain   Jose Corado Jr is a 83 y.o. male, pmhx as stated above, who we are seeing in consult for AF RVR. Patient is a poor historian. Patient presented to the ER initially for c/o right wrist pain. While in the ED he had an episode of WCT in setting of electrolyte  04/15/24   EKG 12 Lead   Result Value    Ventricular Rate 76    Atrial Rate 108    QRS Duration 116    Q-T Interval 400    QTc Calculation (Bazett) 450    R Axis -63    T Axis 97    Diagnosis      Atrial fibrillation with PVC's  Left axis deviation  Incomplete right bundle branch block  Minimal voltage criteria for LVH, may be normal variant ( Anselmo product )  Cannot rule out Anterior infarct , age undetermined  Abnormal ECG    Confirmed by MD Rodriguez Ryan ([id number]) on 4/15/2024 3:42:46 PM  Also confirmed by MD Rodriguez Ryan [id number]),  Re Reinoso ([id number])    on 4/15/2024 4:26:07 PM       08/22/23    ECHO (TTE) COMPLETE (CONTRAST/BUBBLE/3D PRN) 08/22/2023 11:59 AM (Final)    Interpretation Summary    Left Ventricle: Low normal left ventricular systolic function with a visually estimated EF of 50 - 55%. Left ventricle size is normal. Increased wall thickness. Findings consistent with severe concentric hypertrophy.  Unable to perform strain due to irregular heart rhythym. Normal wall motion.    Right Ventricle: Reduced systolic function.    Mitral Valve: Mild regurgitation.    Left Atrium: Left atrium is moderately dilated. LA Vol Index A/L is 48 mL/m2.    Right Atrium: Right atrium is dilated.    Pulmonary Arteries: Mild pulmonary hypertension present. The estimated PASP is 44 mmHg.    Aorta: Mildly dilated aortic root. Ao root diameter is 3.8 cm.    Signed by: Kaylynn Salgado MD on 8/22/2023 11:59 AM    08/22/23    NM STRESS TEST WITH MYOCARDIAL PERFUSION 08/22/2023 12:25 PM (Final)    Interpretation Summary    Stress Combined Conclusion: Findings suggest a moderate risk of cardiac events.    Stress Function: Left ventricular function post-stress is abnormal. Post-stress ejection fraction is 28%. The stress end diastolic cavity size is normal. Stress end diastolic index: 128.0 mL. The stress end systolic cavity size is severely enlarged. Stress end systolic index: 93.0 mL    Perfusion Comments: Prone images

## 2024-04-16 NOTE — PROGRESS NOTES
Bedside and Verbal shift change report given to Juila RN (oncoming nurse) by Edin RN (offgoing nurse). Report included the following information Nurse Handoff Report, Adult Overview, and Cardiac Rhythm A Fib .     Morning assessment completed, patient complains of pain 6/10 to R Wrist.    0920: Morning medication administration completed, patient tolerated PO medications well without complications. PRN Tramadol administered.     1240: Wife at bedside.     1530: PRN Tramadol administered for R wrist pain.    Bedside and Verbal shift change report given to Edin MORSE (oncoming nurse) by Julia RN (offgoing nurse). Report included the following information Nurse Handoff Report, Adult Overview, and Cardiac Rhythm A Fib .

## 2024-04-16 NOTE — CONSULTS
Consult    Patient: Jose Corado Jr MRN: [id number]  SSN: [id number]    YOB: 1940  Age: 83 y.o.  Sex: male      Subjective:      Jose Corado Jr is a 83 y.o. diabetic male referred by the ED provider for 3-day history of right wrist/hand pain and swelling.  No history of trauma.  Mr. Corado has a history of gout in the past.  His wrist was aspirated 1 cc slightly cloudy fluid yesterday at [facility].  Fluid sent for cell count and differential.  Uric acid value was elevated at 10.3.  Patient is on Eliquis.  His pain is better today and his swelling has decreased since the aspiration.    Past Medical History:   Diagnosis Date    CAD (coronary artery disease)     Congestive heart failure (HCC)     Diabetes (HCC)     Heart failure (HCC)     History of low potassium     Hypertension     Stroke (HCC)      Past Surgical History:   Procedure Laterality Date    EYE SURGERY      MAy 2 and May 17 cataract sugery in both eyes    [place] UNLISTED PROCEDURE CARDIAC SURGERY  2007    bypass    TCAT IMPL WRLS P-ART PRS SNR L-T HEMODYN MNTR Right 2/10/2020    (Cardio MEMS) IMPLANT HEART FAILURE MONITORING DEVICE performed by Mirna Velasco MD at [facility] CARDIAC CATH LAB      No family history on file.  Social History     Tobacco Use    Smoking status: Never     Passive exposure: Never    Smokeless tobacco: Never   Substance Use Topics    Alcohol use: No      Current Facility-Administered Medications   Medication Dose Route Frequency Provider Last Rate Last Admin    albuterol (PROVENTIL) (2.5 MG/3ML) 0.083% nebulizer solution 2.5 mg  2.5 mg Nebulization Q4H PRN Jad Melendez MD        apixaban (ELIQUIS) tablet 2.5 mg  2.5 mg Oral BID Jad Melendez MD   2.5 mg at 04/16/24 0209    aspirin EC tablet 81 mg  81 mg Oral Daily Jad Melendez MD        docusate sodium (COLACE) capsule 100 mg  100 mg Oral Daily PRN Jad Melendez MD        Magnesium Oxide (MAGNESIUM-OXIDE) 500 mg  500 mg Oral Daily  XR HAND RIGHT (MIN 3 VIEWS) CLINICAL INDICATION/HISTORY : swelling. COMPARISON: None TECHNIQUE: 3 VIEWS FINDINGS: Moderate first carpal metacarpal joint osteoarthritis. No evidence of fracture or dislocation. Mild diffuse interphalangeal joint and metacarpal phalangeal joint space narrowing. Mild soft tissue swelling. No soft tissue air or radiopaque foreign matter in the soft tissues. Atherosclerotic vascular calcifications.   1.  Soft tissue swelling. 2.  Degenerative changes as above     Assessment:     Hospital Problems             Last Modified POA    * (Principal) Wide-complex tachycardia 4/15/2024 Yes    Acute gout of right wrist 4/16/2024 Yes       Plan:   Treat for gout  Patient is already feeling better 24 hours after aspiration  Follow clinically for continued improvement  Doubt septic joint--no need for surgery at this time  Okay for discharge from Ortho standpoint when medically stable  Follow-up in my office for recheck on Friday 4/19 /24    Signed By: Abraham Barroso MD     April 16, 2024

## 2024-04-16 NOTE — PROGRESS NOTES
04/16/24 0932   Oxygen Therapy/Pulse Ox   O2 Device None (Room air)   Pulse 71   Respirations 18   SpO2 93 %       Patient refused neb treatments at this time. Patient on room air SpO2 93%. No respiratory distress or issues noted.

## 2024-04-16 NOTE — PLAN OF CARE
Problem: Discharge Planning  Goal: Discharge to home or other facility with appropriate resources  4/16/2024 0041 by Edin Fall RN  Outcome: Progressing     Problem: Pain  Goal: Verbalizes/displays adequate comfort level or baseline comfort level  4/16/2024 1116 by Julia Houston RN  Outcome: Progressing  4/16/2024 0041 by Edin Fall RN  Outcome: Progressing     Problem: Safety - Adult  Goal: Free from fall injury  4/16/2024 1116 by Julia Houston RN  Outcome: Progressing  4/16/2024 0041 by Edin Fall RN  Outcome: Progressing     Problem: Chronic Conditions and Co-morbidities  Goal: Patient's chronic conditions and co-morbidity symptoms are monitored and maintained or improved  Outcome: Progressing

## 2024-04-16 NOTE — ED NOTES
TRANSFER - OUT REPORT:    Verbal report given to Edin Fall RN on Jose Corado Jr  being transferred to [facility] for routine progression of patient care       Report consisted of patient's Situation, Background, Assessment and   Recommendations(SBAR).     Information from the following report(s) Nurse Handoff Report was reviewed with the receiving nurse.    Padmini Fall Assessment:    Presents to emergency department  because of falls (Syncope, seizure, or loss of consciousness): No  Age > 70: Yes  Altered Mental Status, Intoxication with alcohol or substance confusion (Disorientation, impaired judgment, poor safety awaremess, or inability to follow instructions): No  Impaired Mobility: Ambulates or transfers with assistive devices or assistance; Unable to ambulate or transer.: Yes  Nursing Judgement: No          Lines:   Peripheral IV 04/15/24 Left Antecubital (Active)   Site Assessment Clean, dry & intact 04/15/24 1540   Line Status Blood return noted;Specimen collected;Normal saline locked 04/15/24 1540   Line Care Connections checked and tightened 04/15/24 1540   Phlebitis Assessment No symptoms 04/15/24 1540   Infiltration Assessment 0 04/15/24 1540   Dressing Status Clean, dry & intact 04/15/24 1540   Dressing Type Transparent 04/15/24 1540       Peripheral IV 04/15/24 Proximal;Right Forearm (Active)   Site Assessment Clean, dry & intact 04/15/24 1631   Line Status Blood return noted 04/15/24 1631   Phlebitis Assessment No symptoms 04/15/24 1631   Infiltration Assessment 0 04/15/24 1631        Opportunity for questions and clarification was provided.      Patient transported with:  Monitor  IVF

## 2024-04-17 ENCOUNTER — APPOINTMENT (OUTPATIENT)
Facility: HOSPITAL | Age: 84
End: 2024-04-17
Attending: INTERNAL MEDICINE
Payer: MEDICARE

## 2024-04-17 LAB
ANION GAP SERPL CALC-SCNC: 2 MMOL/L (ref 3–18)
BUN SERPL-MCNC: 36 MG/DL (ref 7–18)
BUN/CREAT SERPL: 24 (ref 12–20)
CALCIUM SERPL-MCNC: 9.3 MG/DL (ref 8.5–10.1)
CHLORIDE SERPL-SCNC: 104 MMOL/L (ref 100–111)
CO2 SERPL-SCNC: 33 MMOL/L (ref 21–32)
CREAT SERPL-MCNC: 1.51 MG/DL (ref 0.6–1.3)
ECHO AO ASC DIAM: 3.8 CM
ECHO AO ASCENDING AORTA INDEX: 1.79 CM/M2
ECHO AO ROOT DIAM: 3.9 CM
ECHO AO ROOT INDEX: 1.84 CM/M2
ECHO AV AREA PEAK VELOCITY: 2.3 CM2
ECHO AV AREA PEAK VELOCITY: 2.4 CM2
ECHO AV PEAK GRADIENT: 4 MMHG
ECHO AV PEAK VELOCITY: 1 M/S
ECHO BSA: 2.16 M2
ECHO EST RA PRESSURE: 15 MMHG
ECHO LA DIAMETER INDEX: 2.36 CM/M2
ECHO LA DIAMETER: 5 CM
ECHO LA TO AORTIC ROOT RATIO: 1.28
ECHO LA VOL A-L A2C: 97 ML (ref 18–58)
ECHO LA VOL A-L A4C: 88 ML (ref 18–58)
ECHO LA VOL BP: 89 ML (ref 18–58)
ECHO LA VOL MOD A2C: 93 ML (ref 18–58)
ECHO LA VOL MOD A4C: 82 ML (ref 18–58)
ECHO LA VOL/BSA BIPLANE: 42 ML/M2 (ref 16–34)
ECHO LA VOLUME AREA LENGTH: 95 ML
ECHO LA VOLUME INDEX A-L A2C: 46 ML/M2 (ref 16–34)
ECHO LA VOLUME INDEX A-L A4C: 42 ML/M2 (ref 16–34)
ECHO LA VOLUME INDEX AREA LENGTH: 45 ML/M2 (ref 16–34)
ECHO LA VOLUME INDEX MOD A2C: 44 ML/M2 (ref 16–34)
ECHO LA VOLUME INDEX MOD A4C: 39 ML/M2 (ref 16–34)
ECHO LV EDV A2C: 56 ML
ECHO LV EDV A4C: 85 ML
ECHO LV EDV BP: 71 ML (ref 67–155)
ECHO LV EDV INDEX A4C: 40 ML/M2
ECHO LV EDV INDEX BP: 33 ML/M2
ECHO LV EDV NDEX A2C: 26 ML/M2
ECHO LV EJECTION FRACTION A2C: 38 %
ECHO LV EJECTION FRACTION A4C: 52 %
ECHO LV EJECTION FRACTION BIPLANE: 46 % (ref 55–100)
ECHO LV ESV A2C: 35 ML
ECHO LV ESV A4C: 41 ML
ECHO LV ESV BP: 38 ML (ref 22–58)
ECHO LV ESV INDEX A2C: 17 ML/M2
ECHO LV ESV INDEX A4C: 19 ML/M2
ECHO LV ESV INDEX BP: 18 ML/M2
ECHO LV FRACTIONAL SHORTENING: 20 % (ref 28–44)
ECHO LV INTERNAL DIMENSION DIASTOLE INDEX: 1.93 CM/M2
ECHO LV INTERNAL DIMENSION DIASTOLIC: 4.1 CM (ref 4.2–5.9)
ECHO LV INTERNAL DIMENSION SYSTOLIC INDEX: 1.56 CM/M2
ECHO LV INTERNAL DIMENSION SYSTOLIC: 3.3 CM
ECHO LV IVSD: 2.2 CM (ref 0.6–1)
ECHO LV MASS 2D: 385.4 G (ref 88–224)
ECHO LV MASS INDEX 2D: 181.8 G/M2 (ref 49–115)
ECHO LV POSTERIOR WALL DIASTOLIC: 1.8 CM (ref 0.6–1)
ECHO LV RELATIVE WALL THICKNESS RATIO: 0.88
ECHO LVOT AREA: 3.1 CM2
ECHO LVOT DIAM: 2 CM
ECHO LVOT MEAN GRADIENT: 1 MMHG
ECHO LVOT PEAK GRADIENT: 2 MMHG
ECHO LVOT PEAK GRADIENT: 2 MMHG
ECHO LVOT PEAK VELOCITY: 0.8 M/S
ECHO LVOT PEAK VELOCITY: 0.8 M/S
ECHO LVOT STROKE VOLUME INDEX: 18.5 ML/M2
ECHO LVOT SV: 39.3 ML
ECHO LVOT VTI: 12.5 CM
ECHO PV MAX VELOCITY: 0.6 M/S
ECHO PV PEAK GRADIENT: 1 MMHG
ECHO RA VOLUME BIPLANE METHOD OF DISKS: 94 ML
ECHO RA VOLUME INDEX BP: 44 ML/M2
ECHO RIGHT VENTRICULAR SYSTOLIC PRESSURE (RVSP): 60 MMHG
ECHO TV REGURGITANT MAX VELOCITY: 3.34 M/S
ECHO TV REGURGITANT PEAK GRADIENT: 45 MMHG
GLUCOSE SERPL-MCNC: 140 MG/DL (ref 74–99)
MAGNESIUM SERPL-MCNC: 2 MG/DL (ref 1.6–2.6)
POTASSIUM SERPL-SCNC: 3.3 MMOL/L (ref 3.5–5.5)
SODIUM SERPL-SCNC: 139 MMOL/L (ref 136–145)

## 2024-04-17 PROCEDURE — APPNB15 APP NON BILLABLE TIME 0-15 MINS: Performed by: PHYSICIAN ASSISTANT

## 2024-04-17 PROCEDURE — 80048 BASIC METABOLIC PNL TOTAL CA: CPT

## 2024-04-17 PROCEDURE — 83735 ASSAY OF MAGNESIUM: CPT

## 2024-04-17 PROCEDURE — 6370000000 HC RX 637 (ALT 250 FOR IP): Performed by: INTERNAL MEDICINE

## 2024-04-17 PROCEDURE — 94761 N-INVAS EAR/PLS OXIMETRY MLT: CPT

## 2024-04-17 PROCEDURE — 94640 AIRWAY INHALATION TREATMENT: CPT

## 2024-04-17 PROCEDURE — 93306 TTE W/DOPPLER COMPLETE: CPT

## 2024-04-17 PROCEDURE — 99232 SBSQ HOSP IP/OBS MODERATE 35: CPT | Performed by: FAMILY MEDICINE

## 2024-04-17 PROCEDURE — 1100000003 HC PRIVATE W/ TELEMETRY

## 2024-04-17 PROCEDURE — 93306 TTE W/DOPPLER COMPLETE: CPT | Performed by: INTERNAL MEDICINE

## 2024-04-17 PROCEDURE — 2580000003 HC RX 258: Performed by: INTERNAL MEDICINE

## 2024-04-17 PROCEDURE — 6370000000 HC RX 637 (ALT 250 FOR IP): Performed by: PHYSICIAN ASSISTANT

## 2024-04-17 PROCEDURE — 99232 SBSQ HOSP IP/OBS MODERATE 35: CPT | Performed by: INTERNAL MEDICINE

## 2024-04-17 PROCEDURE — 6360000002 HC RX W HCPCS: Performed by: INTERNAL MEDICINE

## 2024-04-17 RX ORDER — SPIRONOLACTONE 25 MG/1
25 TABLET ORAL DAILY
Status: DISCONTINUED | OUTPATIENT
Start: 2024-04-17 | End: 2024-04-19 | Stop reason: HOSPADM

## 2024-04-17 RX ORDER — METOPROLOL SUCCINATE 25 MG/1
25 TABLET, EXTENDED RELEASE ORAL DAILY
Status: DISCONTINUED | OUTPATIENT
Start: 2024-04-17 | End: 2024-04-19 | Stop reason: HOSPADM

## 2024-04-17 RX ORDER — FUROSEMIDE 40 MG/1
40 TABLET ORAL DAILY
Status: DISCONTINUED | OUTPATIENT
Start: 2024-04-17 | End: 2024-04-19 | Stop reason: HOSPADM

## 2024-04-17 RX ADMIN — POTASSIUM CHLORIDE 40 MEQ: 1500 TABLET, EXTENDED RELEASE ORAL at 00:30

## 2024-04-17 RX ADMIN — SPIRONOLACTONE 25 MG: 25 TABLET ORAL at 10:40

## 2024-04-17 RX ADMIN — BUDESONIDE 500 MCG: 0.5 INHALANT RESPIRATORY (INHALATION) at 20:21

## 2024-04-17 RX ADMIN — ASPIRIN 81 MG: 81 TABLET, COATED ORAL at 08:41

## 2024-04-17 RX ADMIN — METOPROLOL SUCCINATE 25 MG: 25 TABLET, FILM COATED, EXTENDED RELEASE ORAL at 08:41

## 2024-04-17 RX ADMIN — ARFORMOTEROL TARTRATE 15 MCG: 15 SOLUTION RESPIRATORY (INHALATION) at 07:34

## 2024-04-17 RX ADMIN — BUDESONIDE 500 MCG: 0.5 INHALANT RESPIRATORY (INHALATION) at 07:34

## 2024-04-17 RX ADMIN — IPRATROPIUM BROMIDE 0.5 MG: 0.5 SOLUTION RESPIRATORY (INHALATION) at 07:34

## 2024-04-17 RX ADMIN — APIXABAN 2.5 MG: 2.5 TABLET, FILM COATED ORAL at 08:41

## 2024-04-17 RX ADMIN — APIXABAN 2.5 MG: 2.5 TABLET, FILM COATED ORAL at 20:09

## 2024-04-17 RX ADMIN — PRAVASTATIN SODIUM 20 MG: 20 TABLET ORAL at 08:40

## 2024-04-17 RX ADMIN — SERTRALINE HYDROCHLORIDE 150 MG: 50 TABLET ORAL at 08:41

## 2024-04-17 RX ADMIN — Medication 500 MG: at 08:41

## 2024-04-17 RX ADMIN — FUROSEMIDE 40 MG: 40 TABLET ORAL at 11:12

## 2024-04-17 RX ADMIN — SODIUM CHLORIDE, PRESERVATIVE FREE 10 ML: 5 INJECTION INTRAVENOUS at 20:10

## 2024-04-17 RX ADMIN — SODIUM CHLORIDE, PRESERVATIVE FREE 10 ML: 5 INJECTION INTRAVENOUS at 08:43

## 2024-04-17 RX ADMIN — ARFORMOTEROL TARTRATE 15 MCG: 15 SOLUTION RESPIRATORY (INHALATION) at 20:21

## 2024-04-17 RX ADMIN — IPRATROPIUM BROMIDE 0.5 MG: 0.5 SOLUTION RESPIRATORY (INHALATION) at 20:21

## 2024-04-17 RX ADMIN — POTASSIUM CHLORIDE 40 MEQ: 1500 TABLET, EXTENDED RELEASE ORAL at 11:12

## 2024-04-17 ASSESSMENT — PAIN SCALES - GENERAL
PAINLEVEL_OUTOF10: 0

## 2024-04-17 NOTE — PROGRESS NOTES
[facility] - Progress Note    Admit Date: 4/15/2024  Attending Cardiologist: Dr. Harish Salgado     Assessment:     -Gout. Right wrist pain, s/p arthrocentesis.   -Chronic AF, on Eliquis as outpatient.   -Prolonged Qtc 583 ms in setting of Hypokalemia and Hypomagnesia, improved.    -Chronic HFpEF, s/p cardiomems. Echo 08/2023, EF 50-55%, severe concentric hypertrophy   -Elevated troponin, flat and not c/w primary ACS, no s/s concerning for angina. Chronically elevated at baseline.   -LVH  -CAD, s/p remote CABG. S/p [facility] 2020  Patent LIMA-LAD, SVG- to OM2 and diagonal 2 artery. mid RCA occlusion with left to right collaterals. Fixed defect on nuclear stress test 08/2023.   -Pulmonary hypertension, s/p RH 2020 high pulmonary capillary wedge   -HTN, on amlodipine          Primary cardiologist is Dr. Kaylynn Salgado    Plan:       I saw, evaluated, interviewed and examined the patient personally.  No cardiac complaint at this time  No arrhythmia noted overnight  Hemodynamically stable  Agree with maintenance diuretics as outlined below  Anticoagulation for A-fib  Continue beta-blocker  Echo pending.    Harish Salgado MD       -Repeat BMP pending. Continue electrolyte replacement as indicated. Recommend maintaining K at 4.0 and Mg at 2.0.   -Resume lasix. Start aldactone for diastolic dysfunction and hypokalemia.   -Continue ASA, statin.   -Eliquis 2.5 mg BID for stroke prophylaxis (reduced dose based on Scr and age)   -Switch from lopressor to Toprol XL.   -Echo to be done today, final recommendations pending results.      Subjective:     No new complaints.     Objective:      Patient Vitals for the past 8 hrs:   Temp Pulse Resp BP SpO2   04/17/24 0950 -- -- -- 130/74 --   04/17/24 0736 97 °F (36.1 °C) 83 16 126/78 95 %   04/17/24 0500 -- 66 -- -- --   04/17/24 0400 98.8 °F (37.1 °C) 70 20 111/77 98 %   04/17/24 0300 -- 66 -- -- --         Patient Vitals for the past 96 hrs:   Weight   04/17/24 0950 96.2 kg (212

## 2024-04-17 NOTE — PROGRESS NOTES
lung disease 06/27/2023    Tear film insufficiency 06/27/2023    Secondary pulmonary hypertension 06/27/2023    Tinnitus 06/27/2023    Aortic dilatation ([facility]) 01/23/2023    Chronic renal disease, stage III ([facility]) 01/23/2023    Congestive heart failure ([facility]) 03/21/2020    Acute CHF ([facility]) 03/21/2020    CHF (congestive heart failure), NYHA class III, chronic, diastolic ([facility]) 01/29/2020    Atrial fibrillation ([facility]) 01/19/2020    Dyspnea on exertion 01/10/2020    Stable angina ([facility]) 01/10/2020    Orthostatic hypotension 01/04/2020    LOCO (acute kidney injury) ([facility]) 01/04/2020    Postural dizziness with presyncope 01/04/2020    Morbid obesity ([facility]) 01/04/2020    Mood disorder ([facility]) 10/29/2018    Type 2 diabetes with nephropathy ([facility]) 10/29/2018    Atrial flutter ([facility]) 10/04/2018    Coronary artery disease of bypass graft with stable angina pectoris ([facility]) 10/04/2018    Chronic diastolic congestive heart failure ([facility]) 10/04/2018    ACP (advance care planning) 09/27/2018    Dyslipidemia 09/12/2018    Chronic obstructive pulmonary disease ([facility]) 09/12/2018    Primary central sleep apnea 09/12/2018    Coronary artery disease involving native heart 09/12/2018    Type 2 diabetes mellitus with hyperglycemia, without long-term current use of insulin ([facility]) 09/12/2018    Essential hypertension 09/12/2018    S/P CABG x 4 09/12/2018    Syncope and collapse 08/06/2017        The  provided the following Interventions:  Initiated a relationship of care and support.   Explored issues of mila, belief, spirituality and Druze/ritual needs while hospitalized.  Provided information about Spiritual Care Services.  Chart reviewed.    The following outcomes where achieved:  Patient expressed gratitude for 's visit.    Assessment:  Patient does not have any Druze/cultural needs that will affect patient's preferences in health care.  There are no spiritual or Druze issues which require intervention at this time.      Plan:  Chaplains will continue to follow and will provide pastoral care on an as needed/requested basis.   recommends bedside caregivers page  on duty if patient shows signs of acute spiritual or emotional distress.    juan Mckeon  Spiritual Care   [phone]

## 2024-04-17 NOTE — CARE COORDINATION
04/17/24 1310   /Social Work Whiteboard Notes   /Social Work Whiteboard RED [id number] Patient not medically stable. Patient TDB after PT/OT eval and treat. NASIM Quick BSW   Case Management

## 2024-04-17 NOTE — PLAN OF CARE
Problem: Discharge Planning  Goal: Discharge to home or other facility with appropriate resources  Outcome: Progressing     Problem: Pain  Goal: Verbalizes/displays adequate comfort level or baseline comfort level  4/17/2024 0107 by Edin Fall RN  Outcome: Progressing  4/16/2024 1116 by Julia Houston RN  Outcome: Progressing     Problem: Safety - Adult  Goal: Free from fall injury  4/17/2024 0107 by Edin Fall RN  Outcome: Progressing  4/16/2024 1116 by Julia Houston RN  Outcome: Progressing     Problem: Chronic Conditions and Co-morbidities  Goal: Patient's chronic conditions and co-morbidity symptoms are monitored and maintained or improved  4/17/2024 0107 by Edin Fall RN  Outcome: Progressing  4/16/2024 1116 by Julia Houston RN  Outcome: Progressing

## 2024-04-17 NOTE — PROGRESS NOTES
Bedside and Verbal shift change report given to LITO Benitez / LITO Dumont (oncoming nurse) by LITO Jesus (offgoing nurse). Report included the following information Nurse Handoff Report, Index, Adult Overview, Intake/Output, MAR, Cardiac Rhythm Atrial Fib, Neuro Assessment, and Event Log. Pt awake and in bed, bed locked and in lowest position.     0750: Lab put in to check potassium levels post replacement.     0900: Echo being done at pt's bedside     1024: Iggy MARKHAM paged     1026: Iggy MARKHAM stated that they will put in order to downgrade pt's tele status    1055: Pt's lab draw shows 3.3 potassium results.     1112: Potassium replacement given    1800: Hygiene care performed     1835: Reassessment on pt's neuro level changed from an A&O x 4 to an A&O x 2 (person and situation). Oxygen therapy protocol initiated. Pt now on 2 L/min via Nasal Canula    1910: Bedside and Verbal shift change report given to LITO Julien (oncoming nurse) by LITO Benitez / LITO Dumont (offgoing nurse). Report included the following information Nurse Handoff Report, Index, Adult Overview, Intake/Output, MAR, Cardiac Rhythm A Fib, Neuro Assessment, and Event Log. Pt awake and in bed, bed locked and in lowest position, and the bed alarm is on.

## 2024-04-17 NOTE — CARE COORDINATION
IBRAHIMA placed a request to Dr. Parkinson for PT/OT orders. IBRAHIMA/CM  to follow up with patients dispo after PT/OT eval and treat.    Latanya Quick BSW   Case Management

## 2024-04-18 LAB
ANION GAP SERPL CALC-SCNC: 5 MMOL/L (ref 3–18)
BUN SERPL-MCNC: 38 MG/DL (ref 7–18)
BUN/CREAT SERPL: 24 (ref 12–20)
CALCIUM SERPL-MCNC: 9.5 MG/DL (ref 8.5–10.1)
CHLORIDE SERPL-SCNC: 102 MMOL/L (ref 100–111)
CO2 SERPL-SCNC: 31 MMOL/L (ref 21–32)
CREAT SERPL-MCNC: 1.61 MG/DL (ref 0.6–1.3)
GLUCOSE SERPL-MCNC: 99 MG/DL (ref 74–99)
MAGNESIUM SERPL-MCNC: 2.3 MG/DL (ref 1.6–2.6)
POTASSIUM SERPL-SCNC: 3 MMOL/L (ref 3.5–5.5)
POTASSIUM SERPL-SCNC: 3.3 MMOL/L (ref 3.5–5.5)
SODIUM SERPL-SCNC: 138 MMOL/L (ref 136–145)

## 2024-04-18 PROCEDURE — 97166 OT EVAL MOD COMPLEX 45 MIN: CPT

## 2024-04-18 PROCEDURE — 6360000002 HC RX W HCPCS: Performed by: INTERNAL MEDICINE

## 2024-04-18 PROCEDURE — 83735 ASSAY OF MAGNESIUM: CPT

## 2024-04-18 PROCEDURE — 80048 BASIC METABOLIC PNL TOTAL CA: CPT

## 2024-04-18 PROCEDURE — 99232 SBSQ HOSP IP/OBS MODERATE 35: CPT | Performed by: FAMILY MEDICINE

## 2024-04-18 PROCEDURE — 84132 ASSAY OF SERUM POTASSIUM: CPT

## 2024-04-18 PROCEDURE — 97116 GAIT TRAINING THERAPY: CPT

## 2024-04-18 PROCEDURE — 2580000003 HC RX 258: Performed by: INTERNAL MEDICINE

## 2024-04-18 PROCEDURE — 2700000000 HC OXYGEN THERAPY PER DAY

## 2024-04-18 PROCEDURE — 97535 SELF CARE MNGMENT TRAINING: CPT

## 2024-04-18 PROCEDURE — 6370000000 HC RX 637 (ALT 250 FOR IP): Performed by: INTERNAL MEDICINE

## 2024-04-18 PROCEDURE — 94761 N-INVAS EAR/PLS OXIMETRY MLT: CPT

## 2024-04-18 PROCEDURE — 94640 AIRWAY INHALATION TREATMENT: CPT

## 2024-04-18 PROCEDURE — 1100000003 HC PRIVATE W/ TELEMETRY

## 2024-04-18 PROCEDURE — 36415 COLL VENOUS BLD VENIPUNCTURE: CPT

## 2024-04-18 PROCEDURE — 6370000000 HC RX 637 (ALT 250 FOR IP): Performed by: PHYSICIAN ASSISTANT

## 2024-04-18 PROCEDURE — 97162 PT EVAL MOD COMPLEX 30 MIN: CPT

## 2024-04-18 RX ADMIN — METOPROLOL SUCCINATE 25 MG: 25 TABLET, FILM COATED, EXTENDED RELEASE ORAL at 08:57

## 2024-04-18 RX ADMIN — SODIUM CHLORIDE, PRESERVATIVE FREE 10 ML: 5 INJECTION INTRAVENOUS at 08:58

## 2024-04-18 RX ADMIN — POTASSIUM CHLORIDE 10 MEQ: 7.46 INJECTION, SOLUTION INTRAVENOUS at 04:16

## 2024-04-18 RX ADMIN — SODIUM CHLORIDE: 9 INJECTION, SOLUTION INTRAVENOUS at 12:21

## 2024-04-18 RX ADMIN — IPRATROPIUM BROMIDE 0.5 MG: 0.5 SOLUTION RESPIRATORY (INHALATION) at 19:14

## 2024-04-18 RX ADMIN — APIXABAN 2.5 MG: 2.5 TABLET, FILM COATED ORAL at 20:21

## 2024-04-18 RX ADMIN — IPRATROPIUM BROMIDE 0.5 MG: 0.5 SOLUTION RESPIRATORY (INHALATION) at 08:12

## 2024-04-18 RX ADMIN — FUROSEMIDE 40 MG: 40 TABLET ORAL at 08:57

## 2024-04-18 RX ADMIN — POTASSIUM CHLORIDE 10 MEQ: 7.46 INJECTION, SOLUTION INTRAVENOUS at 06:43

## 2024-04-18 RX ADMIN — APIXABAN 2.5 MG: 2.5 TABLET, FILM COATED ORAL at 08:57

## 2024-04-18 RX ADMIN — Medication 500 MG: at 08:57

## 2024-04-18 RX ADMIN — SERTRALINE HYDROCHLORIDE 150 MG: 50 TABLET ORAL at 08:57

## 2024-04-18 RX ADMIN — POTASSIUM CHLORIDE 40 MEQ: 1500 TABLET, EXTENDED RELEASE ORAL at 16:58

## 2024-04-18 RX ADMIN — POTASSIUM CHLORIDE 10 MEQ: 7.46 INJECTION, SOLUTION INTRAVENOUS at 05:25

## 2024-04-18 RX ADMIN — POTASSIUM CHLORIDE 10 MEQ: 7.46 INJECTION, SOLUTION INTRAVENOUS at 08:57

## 2024-04-18 RX ADMIN — ARFORMOTEROL TARTRATE 15 MCG: 15 SOLUTION RESPIRATORY (INHALATION) at 08:12

## 2024-04-18 RX ADMIN — POTASSIUM CHLORIDE 10 MEQ: 7.46 INJECTION, SOLUTION INTRAVENOUS at 12:21

## 2024-04-18 RX ADMIN — ASPIRIN 81 MG: 81 TABLET, COATED ORAL at 08:57

## 2024-04-18 RX ADMIN — BUDESONIDE 500 MCG: 0.5 INHALANT RESPIRATORY (INHALATION) at 19:14

## 2024-04-18 RX ADMIN — SPIRONOLACTONE 25 MG: 25 TABLET ORAL at 08:57

## 2024-04-18 RX ADMIN — ARFORMOTEROL TARTRATE 15 MCG: 15 SOLUTION RESPIRATORY (INHALATION) at 19:14

## 2024-04-18 RX ADMIN — BUDESONIDE 500 MCG: 0.5 INHALANT RESPIRATORY (INHALATION) at 08:12

## 2024-04-18 RX ADMIN — SODIUM CHLORIDE, PRESERVATIVE FREE 10 ML: 5 INJECTION INTRAVENOUS at 20:21

## 2024-04-18 RX ADMIN — POTASSIUM CHLORIDE 10 MEQ: 7.46 INJECTION, SOLUTION INTRAVENOUS at 11:00

## 2024-04-18 RX ADMIN — IPRATROPIUM BROMIDE 0.5 MG: 0.5 SOLUTION RESPIRATORY (INHALATION) at 13:16

## 2024-04-18 RX ADMIN — PRAVASTATIN SODIUM 20 MG: 20 TABLET ORAL at 08:57

## 2024-04-18 ASSESSMENT — PAIN SCALES - GENERAL
PAINLEVEL_OUTOF10: 0

## 2024-04-18 NOTE — PLAN OF CARE
Problem: Discharge Planning  Goal: Discharge to home or other facility with appropriate resources  4/18/2024 0057 by Anaya Fernandez RN  Outcome: Progressing     Problem: Pain  Goal: Verbalizes/displays adequate comfort level or baseline comfort level  4/18/2024 0057 by Anaya Fernandez RN  Outcome: Progressing     Problem: Safety - Adult  Goal: Free from fall injury  4/18/2024 0057 by Anaya Fernandez RN  Outcome: Progressing     Problem: Chronic Conditions and Co-morbidities  Goal: Patient's chronic conditions and co-morbidity symptoms are monitored and maintained or improved  4/18/2024 0057 by Anaya Fernandez RN  Outcome: Progressing

## 2024-04-18 NOTE — PROGRESS NOTES
mg  81 mg Oral Daily    docusate sodium (COLACE) capsule 100 mg  100 mg Oral Daily PRN    Magnesium Oxide (MAGNESIUM-OXIDE) 500 mg  500 mg Oral Daily    nitroGLYCERIN (NITROSTAT) SL tablet 0.4 mg  0.4 mg SubLINGual Q5 Min PRN    pravastatin (PRAVACHOL) tablet 20 mg  20 mg Oral Daily    sertraline (ZOLOFT) tablet 150 mg  150 mg Oral Daily    sodium chloride flush 0.9 % injection 5-40 mL  5-40 mL IntraVENous 2 times per day    sodium chloride flush 0.9 % injection 5-40 mL  5-40 mL IntraVENous PRN    0.9 % sodium chloride infusion   IntraVENous PRN    potassium chloride (KLOR-CON M) extended release tablet 40 mEq  40 mEq Oral PRN    Or    potassium bicarb-citric acid (EFFER-K) effervescent tablet 40 mEq  40 mEq Oral PRN    Or    potassium chloride 10 mEq/100 mL IVPB (Peripheral Line)  10 mEq IntraVENous PRN    magnesium sulfate 2000 mg in 50 mL IVPB premix  2,000 mg IntraVENous PRN    ondansetron (ZOFRAN-ODT) disintegrating tablet 4 mg  4 mg Oral Q8H PRN    Or    ondansetron (ZOFRAN) injection 4 mg  4 mg IntraVENous Q6H PRN    polyethylene glycol (GLYCOLAX) packet 17 g  17 g Oral Daily PRN    acetaminophen (TYLENOL) tablet 650 mg  650 mg Oral Q6H PRN    Or    acetaminophen (TYLENOL) suppository 650 mg  650 mg Rectal Q6H PRN    arformoterol tartrate (BROVANA) nebulizer solution 15 mcg  15 mcg Nebulization BID RT    And    budesonide (PULMICORT) nebulizer suspension 500 mcg  0.5 mg Nebulization BID RT    And    ipratropium (ATROVENT) 0.02 % nebulizer solution 0.5 mg  0.5 mg Nebulization TID RT    traMADol (ULTRAM) tablet 50 mg  50 mg Oral Q6H PRN        Labs:    Recent Results (from the past 24 hour(s))   Basic Metabolic Panel w/ Reflex to MG    Collection Time: 04/18/24  2:01 AM   Result Value Ref Range    Sodium 138 136 - 145 mmol/L    Potassium 3.0 (L) 3.5 - 5.5 mmol/L    Chloride 102 100 - 111 mmol/L    CO2 31 21 - 32 mmol/L    Anion Gap 5 3.0 - 18 mmol/L    Glucose 99 74 - 99 mg/dL    BUN 38 (H) 7.0 - 18 MG/DL     Creatinine 1.61 (H) 0.6 - 1.3 MG/DL    Bun/Cre Ratio 24 (H) 12 - 20      Est, Glom Filt Rate 42 (L) >60 ml/min/1.73m2    Calcium 9.5 8.5 - 10.1 MG/DL   Magnesium    Collection Time: 04/18/24  2:01 AM   Result Value Ref Range    Magnesium 2.3 1.6 - 2.6 mg/dL         Signed By: Roshan Parkinson MD

## 2024-04-18 NOTE — PROGRESS NOTES
the patient's independence.    This AMPAC score should be considered in conjunction with interdisciplinary team recommendations to determine the most appropriate discharge setting. Patient's social support, diagnosis, medical stability, and prior level of function should also be taken into consideration.     SUBJECTIVE:   Patient stated “I've had a fall or two.”    OBJECTIVE DATA SUMMARY:     Past Medical History:   Diagnosis Date    CAD (coronary artery disease)     Congestive heart failure (HCC)     Diabetes (HCC)     Heart failure (HCC)     History of low potassium     Hypertension     Stroke (HCC)      Past Surgical History:   Procedure Laterality Date    EYE SURGERY      MAy 2 and May 17 cataract sugery in both eyes    SC UNLISTED PROCEDURE CARDIAC SURGERY  2007    bypass    TCAT IMPL WRLS P-ART PRS SNR L-T HEMODYN MNTR Right 2/10/2020    (Cardio MEMS) IMPLANT HEART FAILURE MONITORING DEVICE performed by Mirna Velasco MD at [facility] CARDIAC CATH LAB       Home Situation:  Social/Functional History  Lives With: Spouse  Type of Home: House  Home Layout: One level  Home Access: Stairs to enter without rails  Entrance Stairs - Number of Steps: 3  Entrance Stairs - Rails: None  Home Equipment: Walker, rolling  Has the patient had two or more falls in the past year or any fall with injury in the past year?: Yes  Receives Help From: Family  Ambulation Assistance: Independent (mod I RW)  Transfer Assistance: Independent  Critical Behavior:  Orientation  Overall Orientation Status: Within Normal Limits  Orientation Level: Oriented X4  Cognition  Overall Cognitive Status: WNL    Strength:    Strength: Generally decreased, functional    Tone & Sensation:   Tone: Normal  Sensation: Intact    Range Of Motion:  AROM: Within functional limits  PROM: Within functional limits    Functional Mobility:  Bed Mobility:     Bed Mobility Training  Bed Mobility Training: Yes  Rolling: Modified independent  Supine to Sit: Stand-by  assistance;Additional time  Scooting: Modified independent;Additional time  Transfers:     Transfer Training  Transfer Training: Yes  Sit to Stand: Supervision  Stand to Sit: Supervision    Balance:               Balance  Sitting: Intact  Standing: With support  Standing - Static: Good  Standing - Dynamic: Good          Ambulation/Gait Training:                       Gait  Gait Training: Yes  Left Side Weight Bearing: As tolerated  Right Side Weight Bearing: As tolerated  Overall Level of Assistance: Supervision  Distance (ft): 10 Feet  Assistive Device: Walker, rolling  Interventions: Safety awareness training  Speed/Lakisha: Slow               Pain:  Intensity Pre-treatment: 0/10   Intensity Post-treatment: 0/10      Activity Tolerance:   Activity Tolerance: Patient tolerated evaluation without incident  Please refer to the flowsheet for vital signs taken during this treatment.    After treatment:   [x]         Patient left in no apparent distress sitting up in chair  []         Patient left in no apparent distress in bed  [x]         Call bell left within reach  [x]         Nursing notified  []         Caregiver present  []         Bed alarm activated  []         SCDs applied    COMMUNICATION/EDUCATION:   Patient Education  Education Given To: Patient  Education Provided: Role of Therapy;Plan of Care;Home Exercise Program;Transfer Training;Energy Conservation;Fall Prevention Strategies;Equipment  Education Method: Demonstration;Verbal;Teach Back  Barriers to Learning: None  Education Outcome: Verbalized understanding;Demonstrated understanding    Thank you for this referral.  Essence Ayala, PT  Minutes: 22      Eval Complexity: Decision Making: Medium Complexity

## 2024-04-18 NOTE — PROGRESS NOTES
[facility]  Progress Note    Patient: Jose Corado Jr Age: 83 y.o. : 1940 MR#: [id number] SSN: [id number]      Subjective/24-hour events:     Feeling better today.  No acute chest pain or SOB.  Wife present at bedside.    Assessment:   Acute gout flare suspected  WCT  LOCO  Elevated troponin  Electrolyte abnormality: Hypokalemia, hypomagnesemia  Advanced age    Plan:   Continue current medical management.  ASA/statin/aldactone/Eliquis.  Await further cardiology recommendations re: WCT.  PT/OT, mobilize. Disposition TBD.  Supportive care o/w.  Follow.    Case discussed with:  [x]Patient  [x]Family  [x] Nursing  [x]Case Management  DVT Prophylaxis:  []Lovenox  []Hep SQ  []SCDs  []Coumadin   []On Heparin gtt [x]PO anticoagulant    Objective:   VS: /78   Pulse 82   Temp 97.4 °F (36.3 °C) (Oral)   Resp 18   Ht 1.753 m (5' 9\")   Wt 96.2 kg (212 lb)   SpO2 97%   BMI 31.31 kg/m²      Gen:  In NAD.  Lungs: Clear, no wheezes  Effort nonlabored.  CV: RRR.  Abdomen: Soft, NTTP.  Extremities: Warm, no pitting edema or ischemia.  Neuro:  Awake and alert, moves extremities spontaneously.    Current Facility-Administered Medications   Medication Dose Route Frequency    metoprolol succinate (TOPROL XL) extended release tablet 25 mg  25 mg Oral Daily    perflutren lipid microspheres (DEFINITY) injection 2 mL  2 mL IntraVENous ONCE PRN    spironolactone (ALDACTONE) tablet 25 mg  25 mg Oral Daily    furosemide (LASIX) tablet 40 mg  40 mg Oral Daily    albuterol (PROVENTIL) (2.5 MG/3ML) 0.083% nebulizer solution 2.5 mg  2.5 mg Nebulization Q4H PRN    apixaban (ELIQUIS) tablet 2.5 mg  2.5 mg Oral BID    aspirin EC tablet 81 mg  81 mg Oral Daily    docusate sodium (COLACE) capsule 100 mg  100 mg Oral Daily PRN    Magnesium Oxide (MAGNESIUM-OXIDE) 500 mg  500 mg Oral Daily    nitroGLYCERIN (NITROSTAT) SL tablet 0.4 mg  0.4 mg SubLINGual Q5 Min PRN    pravastatin (PRAVACHOL)

## 2024-04-18 NOTE — PROGRESS NOTES
OCCUPATIONAL THERAPY EVALUATION/DISCHARGE    Patient: Jose Corado Jr (83 y.o. male)  Date: 4/18/2024  Primary Diagnosis: Hypokalemia [E87.6]  Hypomagnesemia [E83.42]  Wide-complex tachycardia [R00.0]  Elevated troponin [R79.89]  LOCO (acute kidney injury) (HCC) [N17.9]  Acute gout of right wrist, unspecified cause [M10.9]  Precautions: General Precautions  PLOF: Patient was independent with self-care and used a FWW for functional mobility PTA.      ASSESSMENT AND RECOMMENDATIONS:  Pt cleared to participate in OT evaluation by RN. Pt seated in chair, alert, and agreeable to participate with spouse present and 2L NC donned. Educated on the role of OT, evaluation process, and safety during this admission with pt verbalizing understanding. Pt is independent - modified independent with basic self-care and supervision with functional transfers in preparation for ADLs. Patient reports R hand pain has improved and no further ADL concerns. Based on the objective data described below, pt presents with no deficits that impede pt function with ADLs.  At this time pt is safe to d/c home with family support from selfcare standpoint. Pt left all needs met and call bell in reach.      Maximum therapeutic gains met at current level of care and patient will be discharged from occupational therapy at this time.    Further Equipment Recommendations for Discharge: rolling walker    AMPAC: AM-PAC Inpatient Daily Activity Raw Score: 24    Current research shows that an AM-PAC score of 18 or greater is associated with a discharge to the patient's home setting.  Based on an AM-PAC score and their current ADL deficits; it is recommended that the patient have 2-3 sessions per week of Occupational Therapy at d/c to increase the patient's independence.      This AMPAC score should be considered in conjunction with interdisciplinary team recommendations to determine the most appropriate discharge setting. Patient's social support,

## 2024-04-18 NOTE — PROGRESS NOTES
0730: Bedside and Verbal shift change report given to LITO Mcintosh (oncoming nurse) by LITO Julien (offgoing nurse). Report included the following information Nurse Handoff Report, Adult Overview, Recent Results, Cardiac Rhythm Afib, Neuro Assessment, and Event Log.      0945: Pt transferred to chair by PT. Call light within reach.     1345: Bag six of potassium complete. Potassium lab order placed per protocol.     1700: Potassium level at 3.3. PO potassium 40meq PRN replacement given to pt per protocol.

## 2024-04-18 NOTE — PROGRESS NOTES
[facility] - [facility] Note    Admit Date: 4/15/2024  Attending Cardiologist: Dr. Alvarez    Assessment:     -Gout. Right wrist pain, s/p arthrocentesis.   -Chronic AF, on Eliquis as outpatient.   -Cardiomyopathy, Echo 4/17/2024 with LVEF 40-45% (from 50-55% in 08/2023) with severely increased LV wall thickness, indeterminate LV diastolic function secondary to afib; RV with reduced systolic function, no significant valvular disease   -s/p cardiomems  -Elevated troponin, flat and not c/w primary ACS, no s/s concerning for angina. Chronically elevated at baseline.   -Prolonged Qtc 583 ms in setting of Hypokalemia and Hypomagnesia, improved.   EKG from 4/15/20  -LVH  -CAD, s/p remote CABG. S/p C 2020  Patent LIMA-LAD, SVG- to OM2 and diagonal 2 artery. mid RCA occlusion with left to right collaterals. Fixed defect on nuclear stress test 08/2023.   -Pulmonary hypertension, s/p [facility] 2020 high pulmonary capillary wedge   -HTN, on amlodipine      Primary cardiologist was Dr. Kaylynn Salgado    Plan:     -Recommend to maintain K > 4, Mg > 2 from cardiac standpoint.  Replacement per primary team.  -Continued on reduced dose Elqiuis 2.5 mg BID for CVA prophylaxis in setting of chronic afib secondary to age and renal function.  -Continue PO Lasix, Aldactone, Toprol. Will continue cardioselective BB with underlying COPD.    Subjective:     No new complaints.     Objective:      Patient Vitals for the past 8 hrs:   Temp Pulse Resp BP SpO2   04/18/24 0812 -- -- -- -- 96 %   04/18/24 0730 97 °F (36.1 °C) 66 18 121/78 95 %   04/18/24 0415 97.3 °F (36.3 °C) 53 18 135/84 96 %         Patient Vitals for the past 96 hrs:   Weight   04/17/24 0950 96.2 kg (212 lb)   04/15/24 1203 96.2 kg (212 lb)             Current Facility-Administered Medications   Medication Dose Route Frequency    metoprolol succinate (TOPROL XL) extended release tablet 25 mg  25 mg Oral Daily    perflutren lipid microspheres (DEFINITY) injection 2 mL  2 mL  data filed at 4/18/2024 0857  Gross per 24 hour   Intake 300 ml   Output 500 ml   Net -200 ml       Physical Exam:  General:  alert, appears stated age, cooperative, and no distress  Neck:  supple  Lungs:  clear to auscultation bilaterally  Heart:  irregularly irregular rhythm  Abdomen:  abdomen is soft without significant tenderness, masses, organomegaly or guarding  Extremities:  atraumatic, no edema    Visit Vitals  /78   Pulse 66   Temp 97 °F (36.1 °C) (Oral)   Resp 18   Ht 1.753 m (5' 9\")   Wt 96.2 kg (212 lb)   SpO2 96%   BMI 31.31 kg/m²       Data Review:     Labs: Results:       Chemistry Recent Labs     04/15/24  1549 04/16/24  0324 04/16/24 2024 04/17/24  0905 04/18/24  0201    136  --  139 138   K 2.6* 2.7* 2.7* 3.3* 3.0*   CL 97* 97*  --  104 102   CO2 36* 28  --  33* 31   BUN 45* 41*  --  36* 38*   CREATININE 1.92* 1.75*  --  1.51* 1.61*   MG 1.3* 2.2  --  2.0 2.3   GLOB 4.4*  --   --   --   --       CBC w/Diff Recent Labs     04/15/24  1532   WBC 9.5   RBC 4.30*   HGB 13.9   HCT 41.1         Cardiac Enzymes Lab Results   Component Value Date/Time    TROPHS 185 04/16/2024 03:24 AM    TROPHS 310 04/15/2024 05:30 PM    TROPHS 300 04/15/2024 03:49 PM      Lipid Panel Lab Results   Component Value Date/Time    CHOL 222 01/08/2024 10:39 AM    CHOL 148 01/23/2023 10:26 AM    HDL 48 01/08/2024 10:39 AM    VLDL 37 04/22/2021 08:56 AM      BNP Lab Results   Component Value Date/Time    NTPROBNP 4,817 04/16/2024 03:24 AM      Liver Enzymes Lab Results   Component Value Date    ALT 23 04/15/2024    AST 34 04/15/2024    ALKPHOS 82 04/15/2024    BILITOT 0.8 04/15/2024      Thyroid Studies Lab Results   Component Value Date/Time    TSH 1.94 01/18/2024 09:58 AM          Signed By: Chitra Beatty PA-C     April 18, 2024

## 2024-04-19 ENCOUNTER — HOME HEALTH ADMISSION (OUTPATIENT)
Age: 84
End: 2024-04-19

## 2024-04-19 VITALS
HEIGHT: 69 IN | OXYGEN SATURATION: 96 % | DIASTOLIC BLOOD PRESSURE: 81 MMHG | BODY MASS INDEX: 31.4 KG/M2 | WEIGHT: 212 LBS | SYSTOLIC BLOOD PRESSURE: 135 MMHG | RESPIRATION RATE: 21 BRPM | TEMPERATURE: 96.9 F | HEART RATE: 78 BPM

## 2024-04-19 LAB
ANION GAP SERPL CALC-SCNC: 2 MMOL/L (ref 3–18)
BUN SERPL-MCNC: 37 MG/DL (ref 7–18)
BUN/CREAT SERPL: 25 (ref 12–20)
CALCIUM SERPL-MCNC: 9.7 MG/DL (ref 8.5–10.1)
CHLORIDE SERPL-SCNC: 104 MMOL/L (ref 100–111)
CO2 SERPL-SCNC: 31 MMOL/L (ref 21–32)
CREAT SERPL-MCNC: 1.51 MG/DL (ref 0.6–1.3)
GLUCOSE SERPL-MCNC: 99 MG/DL (ref 74–99)
MAGNESIUM SERPL-MCNC: 2.2 MG/DL (ref 1.6–2.6)
POTASSIUM SERPL-SCNC: 3.4 MMOL/L (ref 3.5–5.5)
SODIUM SERPL-SCNC: 137 MMOL/L (ref 136–145)

## 2024-04-19 PROCEDURE — 6370000000 HC RX 637 (ALT 250 FOR IP): Performed by: PHYSICIAN ASSISTANT

## 2024-04-19 PROCEDURE — 83735 ASSAY OF MAGNESIUM: CPT

## 2024-04-19 PROCEDURE — 94640 AIRWAY INHALATION TREATMENT: CPT

## 2024-04-19 PROCEDURE — 2700000000 HC OXYGEN THERAPY PER DAY

## 2024-04-19 PROCEDURE — 6370000000 HC RX 637 (ALT 250 FOR IP): Performed by: INTERNAL MEDICINE

## 2024-04-19 PROCEDURE — 80048 BASIC METABOLIC PNL TOTAL CA: CPT

## 2024-04-19 PROCEDURE — 2580000003 HC RX 258: Performed by: INTERNAL MEDICINE

## 2024-04-19 PROCEDURE — 6360000002 HC RX W HCPCS: Performed by: INTERNAL MEDICINE

## 2024-04-19 PROCEDURE — 36415 COLL VENOUS BLD VENIPUNCTURE: CPT

## 2024-04-19 PROCEDURE — 94761 N-INVAS EAR/PLS OXIMETRY MLT: CPT

## 2024-04-19 RX ORDER — POTASSIUM CHLORIDE 20 MEQ/1
20 TABLET, EXTENDED RELEASE ORAL DAILY
Qty: 7 TABLET | Refills: 0 | Status: SHIPPED | OUTPATIENT
Start: 2024-04-19

## 2024-04-19 RX ORDER — METOPROLOL SUCCINATE 25 MG/1
25 TABLET, EXTENDED RELEASE ORAL DAILY
Qty: 30 TABLET | Refills: 0 | Status: SHIPPED | OUTPATIENT
Start: 2024-04-20

## 2024-04-19 RX ORDER — SPIRONOLACTONE 25 MG/1
25 TABLET ORAL DAILY
Qty: 30 TABLET | Refills: 3 | Status: SHIPPED | OUTPATIENT
Start: 2024-04-20

## 2024-04-19 RX ORDER — POTASSIUM CHLORIDE 20 MEQ/1
40 TABLET, EXTENDED RELEASE ORAL DAILY
Qty: 60 TABLET | Refills: 5 | Status: SHIPPED | OUTPATIENT
Start: 2024-04-19

## 2024-04-19 RX ADMIN — Medication 500 MG: at 08:52

## 2024-04-19 RX ADMIN — ASPIRIN 81 MG: 81 TABLET, COATED ORAL at 08:52

## 2024-04-19 RX ADMIN — SPIRONOLACTONE 25 MG: 25 TABLET ORAL at 08:52

## 2024-04-19 RX ADMIN — PRAVASTATIN SODIUM 20 MG: 20 TABLET ORAL at 08:52

## 2024-04-19 RX ADMIN — METOPROLOL SUCCINATE 25 MG: 25 TABLET, FILM COATED, EXTENDED RELEASE ORAL at 08:52

## 2024-04-19 RX ADMIN — APIXABAN 2.5 MG: 2.5 TABLET, FILM COATED ORAL at 08:52

## 2024-04-19 RX ADMIN — BUDESONIDE 500 MCG: 0.5 INHALANT RESPIRATORY (INHALATION) at 07:10

## 2024-04-19 RX ADMIN — IPRATROPIUM BROMIDE 0.5 MG: 0.5 SOLUTION RESPIRATORY (INHALATION) at 07:10

## 2024-04-19 RX ADMIN — POTASSIUM CHLORIDE 40 MEQ: 1500 TABLET, EXTENDED RELEASE ORAL at 05:49

## 2024-04-19 RX ADMIN — SERTRALINE HYDROCHLORIDE 150 MG: 50 TABLET ORAL at 08:52

## 2024-04-19 RX ADMIN — FUROSEMIDE 40 MG: 40 TABLET ORAL at 08:52

## 2024-04-19 RX ADMIN — ARFORMOTEROL TARTRATE 15 MCG: 15 SOLUTION RESPIRATORY (INHALATION) at 07:10

## 2024-04-19 RX ADMIN — SODIUM CHLORIDE, PRESERVATIVE FREE 10 ML: 5 INJECTION INTRAVENOUS at 08:53

## 2024-04-19 ASSESSMENT — PAIN SCALES - GENERAL
PAINLEVEL_OUTOF10: 0

## 2024-04-19 NOTE — PROGRESS NOTES
[facility] - Progress Note    Admit Date: 4/15/2024  Attending Cardiologist: Dr. Alvarez    Assessment:     -Gout. Right wrist pain, s/p arthrocentesis.   -Chronic AF, on Eliquis as outpatient.   -Cardiomyopathy, Echo 4/17/2024 with LVEF 40-45% (from 50-55% in 08/2023) with severely increased LV wall thickness, indeterminate LV diastolic function secondary to afib; RV with reduced systolic function, no significant valvular disease   -s/p cardiomems  -Elevated troponin, flat and not c/w primary ACS, no s/s concerning for angina. Chronically elevated at baseline.   -Prolonged Qtc 583 ms in setting of Hypokalemia and Hypomagnesia, improved.   EKG from 4/15/20  -LVH  -CAD, s/p remote CABG. S/p LHC 2020  Patent LIMA-LAD, SVG- to OM2 and diagonal 2 artery. mid RCA occlusion with left to right collaterals. Fixed defect on nuclear stress test 08/2023.   -Pulmonary hypertension, s/p RHC 2020 high pulmonary capillary wedge   -HTN, on amlodipine      Primary cardiologist was Dr. Kaylynn Salgado    Plan:     -Continue PO Lasix, Aldactone, Toprol. Will continue cardioselective BB with underlying COPD.   Remains in afib, HR stable/improved.  -Recommend to maintain K > 4, Mg > 2 from cardiac standpoint.  Replacement per primary team.  -Continued on reduced dose Elqiuis 2.5 mg BID for CVA prophylaxis in setting of chronic afib secondary to age and renal function.  -No new recommendations from cardiac standpoint.  Will be available as needed if additional concerns arise.  Pt should follow-up with Dr. Posey's office in 3-4 weeks.    Subjective:     No new complaints.     Objective:      Patient Vitals for the past 8 hrs:   Temp Pulse Resp BP SpO2   04/19/24 0845 97 °F (36.1 °C) 82 20 131/79 96 %   04/19/24 0710 -- 56 18 -- 92 %   04/19/24 0400 97.6 °F (36.4 °C) 80 20 125/69 96 %         Patient Vitals for the past 96 hrs:   Weight   04/17/24 0950 96.2 kg (212 lb)   04/15/24 1203 96.2 kg (212 lb)       TELE: AFIB                Current Facility-Administered Medications   Medication Dose Route Frequency    metoprolol succinate (TOPROL XL) extended release tablet 25 mg  25 mg Oral Daily    perflutren lipid microspheres (DEFINITY) injection 2 mL  2 mL IntraVENous ONCE PRN    spironolactone (ALDACTONE) tablet 25 mg  25 mg Oral Daily    furosemide (LASIX) tablet 40 mg  40 mg Oral Daily    albuterol (PROVENTIL) (2.5 MG/3ML) 0.083% nebulizer solution 2.5 mg  2.5 mg Nebulization Q4H PRN    apixaban (ELIQUIS) tablet 2.5 mg  2.5 mg Oral BID    aspirin EC tablet 81 mg  81 mg Oral Daily    docusate sodium (COLACE) capsule 100 mg  100 mg Oral Daily PRN    Magnesium Oxide (MAGNESIUM-OXIDE) 500 mg  500 mg Oral Daily    nitroGLYCERIN (NITROSTAT) SL tablet 0.4 mg  0.4 mg SubLINGual Q5 Min PRN    pravastatin (PRAVACHOL) tablet 20 mg  20 mg Oral Daily    sertraline (ZOLOFT) tablet 150 mg  150 mg Oral Daily    sodium chloride flush 0.9 % injection 5-40 mL  5-40 mL IntraVENous 2 times per day    sodium chloride flush 0.9 % injection 5-40 mL  5-40 mL IntraVENous PRN    0.9 % sodium chloride infusion   IntraVENous PRN    potassium chloride (KLOR-CON M) extended release tablet 40 mEq  40 mEq Oral PRN    Or    potassium bicarb-citric acid (EFFER-K) effervescent tablet 40 mEq  40 mEq Oral PRN    Or    potassium chloride 10 mEq/100 mL IVPB (Peripheral Line)  10 mEq IntraVENous PRN    magnesium sulfate 2000 mg in 50 mL IVPB premix  2,000 mg IntraVENous PRN    ondansetron (ZOFRAN-ODT) disintegrating tablet 4 mg  4 mg Oral Q8H PRN    Or    ondansetron (ZOFRAN) injection 4 mg  4 mg IntraVENous Q6H PRN    polyethylene glycol (GLYCOLAX) packet 17 g  17 g Oral Daily PRN    acetaminophen (TYLENOL) tablet 650 mg  650 mg Oral Q6H PRN    Or    acetaminophen (TYLENOL) suppository 650 mg  650 mg Rectal Q6H PRN    arformoterol tartrate (BROVANA) nebulizer solution 15 mcg  15 mcg Nebulization BID RT    And    budesonide (PULMICORT) nebulizer suspension 500 mcg  0.5 mg

## 2024-04-19 NOTE — HOME CARE
Received home health referral for [facility] for SN,PT,OT. Discharge order noted for today; spoke to patient's spouse over the phone, Verified demographics,explained HH services ,answered all questions and provided spouse  with [facility] contact number, she states patient  has DME: RW, Rollator, cane and an electric scooter ; HH referral processed to [facility] central Intake and scheduling .ERIKA GOMEZ.

## 2024-04-19 NOTE — CARE COORDINATION
SW spoke with patient and wife at bedside about SNF and patient declined SNF. Patient states he has a PCA that comes in and help him with his ADLs. Referral sent referral for [facility] per patient request.  Patient was accepted per [facility] liaison from [facility].    Patient and patients wife was notified at bedside of patients acceptance with [facility].    Discharge order noted for today. Orders received. No other needs identified at this time. Case management remains available as needed.    Latanya Quick BSW   Case Management

## 2024-04-19 NOTE — PROGRESS NOTES
0730: Bedside and Verbal shift change report given to LITO Mcintosh (oncoming nurse) by LITO Julien (offgoing nurse). Report included the following information Nurse Handoff Report, Adult Overview, Intake/Output, Recent Results, Cardiac Rhythm afib, Neuro Assessment, and Event Log.      1400: Discharge instructions reviewed with pt and spouse. Pt and spouse acknowledged and verbalized understanding. Pt belonging gathered and given to spouse. IV removed.

## 2024-04-19 NOTE — PLAN OF CARE
Problem: Discharge Planning  Goal: Discharge to home or other facility with appropriate resources  4/19/2024 0100 by Anaya Fernandez RN  Outcome: Progressing     Problem: Pain  Goal: Verbalizes/displays adequate comfort level or baseline comfort level  4/19/2024 0100 by Anaya Fernandez RN  Outcome: Progressing     Problem: Safety - Adult  Goal: Free from fall injury  4/19/2024 0100 by Anaya Fernandez RN  Outcome: Progressing     Problem: Chronic Conditions and Co-morbidities  Goal: Patient's chronic conditions and co-morbidity symptoms are monitored and maintained or improved  4/19/2024 0100 by Anaya Fernandez RN  Outcome: Progressing

## 2024-04-19 NOTE — CARE COORDINATION
04/19/24 1313   IMM Letter   IMM Letter given to Patient/Family/Significant other/Guardian/POA/by: Latanya Quick   IMM Letter date given: 04/19/24   IMM Letter time given: 1200     Latanya Quick BSW   Case Management

## 2024-04-21 ENCOUNTER — HOME CARE VISIT (OUTPATIENT)
Age: 84
End: 2024-04-21

## 2024-04-22 ENCOUNTER — HOME CARE VISIT (OUTPATIENT)
Age: 84
End: 2024-04-22

## 2024-04-22 ENCOUNTER — CARE COORDINATION (OUTPATIENT)
Facility: CLINIC | Age: 84
End: 2024-04-22

## 2024-04-22 DIAGNOSIS — K21.9 GASTROESOPHAGEAL REFLUX DISEASE, UNSPECIFIED WHETHER ESOPHAGITIS PRESENT: ICD-10-CM

## 2024-04-22 DIAGNOSIS — I95.1 ORTHOSTATIC HYPOTENSION: Primary | ICD-10-CM

## 2024-04-22 DIAGNOSIS — E11.65 TYPE 2 DIABETES MELLITUS WITH HYPERGLYCEMIA, WITHOUT LONG-TERM CURRENT USE OF INSULIN (HCC): ICD-10-CM

## 2024-04-22 DIAGNOSIS — I20.89 STABLE ANGINA (HCC): ICD-10-CM

## 2024-04-22 DIAGNOSIS — I10 ESSENTIAL HYPERTENSION: ICD-10-CM

## 2024-04-22 DIAGNOSIS — E87.6 HYPOKALEMIA: ICD-10-CM

## 2024-04-22 DIAGNOSIS — I50.32 CHF (CONGESTIVE HEART FAILURE), NYHA CLASS III, CHRONIC, DIASTOLIC (HCC): ICD-10-CM

## 2024-04-22 DIAGNOSIS — I48.91 ATRIAL FIBRILLATION, UNSPECIFIED TYPE (HCC): ICD-10-CM

## 2024-04-22 DIAGNOSIS — J44.9 CHRONIC OBSTRUCTIVE PULMONARY DISEASE, UNSPECIFIED COPD TYPE (HCC): ICD-10-CM

## 2024-04-22 RX ORDER — LORATADINE 10 MG/1
10 TABLET ORAL DAILY
COMMUNITY
Start: 2024-01-24

## 2024-04-22 RX ORDER — M-VIT,TX,IRON,MINS/CALC/FOLIC 27MG-0.4MG
1 TABLET ORAL DAILY
COMMUNITY

## 2024-04-22 NOTE — CARE COORDINATION
Care Transitions Note    Initial Call - Call within 2 business days of discharge: Yes     Patient Current Location:  [place]    Care Transition Nurse contacted the patient, spouse/partner , (PHI form verified; on file) by telephone to perform post hospital discharge assessment, verified name and  as identifiers. Provided introduction to self, and explanation of the Care Transition Nurse role.     Patient: Jose Corado Jr    Patient : 1940   MRN: [id number]    Reason for Admission: Wide Complex Tachycardia, LOCO with Electrolyte Imbalances 2/2 Diuretics, and Right Wrist Gout s/p Arthorocentesis  Discharge Date: 24  RURS: Readmission Risk Score: 14.2      Last Discharge Facility       Date Complaint Diagnosis Description Type Department Provider    4/15/24 Hand Pain; Rash Acute gout of right wrist, unspecified cause ... ED to Hosp-Admission (Discharged) (ADMITTED) [id number] Yoo MD; Ravinder Polanco..            Was this an external facility discharge? No    Additional needs identified to be addressed with provider   No needs identified             Method of communication with provider: none.    Patients top risk factors for readmission: medical condition-LOCO/electrolyte imbalances 2/2 diuretics, gout    Interventions to address risk factors:   Education: Reviewed electrolyte imbalance and CHF/LOCO red flags and when to call provider.  Review of patient management of conditions/medications: Spouse demonstrated understanding of reason for electrolyte imbalance and understanding of changes made to diuretics. States he was advised to resume Lasix today and take Potassium 40 meq daily. Patient will begin checking daily weights and monitoring closely for edema, which he typically accumulates in his legs. Spouse verbalized plan to call provider for weight gain of 2-3 lb overnight or 5 lb in one week. Plan to review weight log on next call and discuss RPM.    Care Summary Note: Patient reports he's

## 2024-04-23 ENCOUNTER — OFFICE VISIT (OUTPATIENT)
Facility: CLINIC | Age: 84
End: 2024-04-23
Payer: MEDICARE

## 2024-04-23 VITALS
DIASTOLIC BLOOD PRESSURE: 72 MMHG | SYSTOLIC BLOOD PRESSURE: 136 MMHG | HEART RATE: 59 BPM | HEIGHT: 69 IN | TEMPERATURE: 97.8 F | RESPIRATION RATE: 20 BRPM | WEIGHT: 218 LBS | OXYGEN SATURATION: 94 % | BODY MASS INDEX: 32.29 KG/M2

## 2024-04-23 DIAGNOSIS — I25.708 ATHEROSCLEROSIS OF CORONARY ARTERY BYPASS GRAFT(S), UNSPECIFIED, WITH OTHER FORMS OF ANGINA PECTORIS (HCC): ICD-10-CM

## 2024-04-23 DIAGNOSIS — I10 ESSENTIAL (PRIMARY) HYPERTENSION: ICD-10-CM

## 2024-04-23 DIAGNOSIS — R00.0 WIDE-COMPLEX TACHYCARDIA: ICD-10-CM

## 2024-04-23 DIAGNOSIS — G47.30 SLEEP APNEA, UNSPECIFIED TYPE: ICD-10-CM

## 2024-04-23 DIAGNOSIS — Z71.89 ACP (ADVANCE CARE PLANNING): ICD-10-CM

## 2024-04-23 DIAGNOSIS — Z09 HOSPITAL DISCHARGE FOLLOW-UP: ICD-10-CM

## 2024-04-23 DIAGNOSIS — I50.32 CHRONIC DIASTOLIC (CONGESTIVE) HEART FAILURE (HCC): ICD-10-CM

## 2024-04-23 DIAGNOSIS — E87.6 HYPOKALEMIA: Primary | ICD-10-CM

## 2024-04-23 DIAGNOSIS — J44.9 CHRONIC OBSTRUCTIVE PULMONARY DISEASE, UNSPECIFIED COPD TYPE (HCC): ICD-10-CM

## 2024-04-23 DIAGNOSIS — E78.5 HYPERLIPIDEMIA, UNSPECIFIED HYPERLIPIDEMIA TYPE: ICD-10-CM

## 2024-04-23 DIAGNOSIS — N18.30 STAGE 3 CHRONIC KIDNEY DISEASE, UNSPECIFIED WHETHER STAGE 3A OR 3B CKD (HCC): ICD-10-CM

## 2024-04-23 DIAGNOSIS — F39 MOOD DISORDER (HCC): ICD-10-CM

## 2024-04-23 PROCEDURE — 1111F DSCHRG MED/CURRENT MED MERGE: CPT | Performed by: FAMILY MEDICINE

## 2024-04-23 PROCEDURE — 3023F SPIROM DOC REV: CPT | Performed by: FAMILY MEDICINE

## 2024-04-23 PROCEDURE — G8427 DOCREV CUR MEDS BY ELIG CLIN: HCPCS | Performed by: FAMILY MEDICINE

## 2024-04-23 PROCEDURE — 1036F TOBACCO NON-USER: CPT | Performed by: FAMILY MEDICINE

## 2024-04-23 PROCEDURE — 3075F SYST BP GE 130 - 139MM HG: CPT | Performed by: FAMILY MEDICINE

## 2024-04-23 PROCEDURE — 3078F DIAST BP <80 MM HG: CPT | Performed by: FAMILY MEDICINE

## 2024-04-23 PROCEDURE — 1123F ACP DISCUSS/DSCN MKR DOCD: CPT | Performed by: FAMILY MEDICINE

## 2024-04-23 PROCEDURE — G8417 CALC BMI ABV UP PARAM F/U: HCPCS | Performed by: FAMILY MEDICINE

## 2024-04-23 PROCEDURE — 99215 OFFICE O/P EST HI 40 MIN: CPT | Performed by: FAMILY MEDICINE

## 2024-04-23 RX ORDER — METOLAZONE 5 MG/1
5 TABLET ORAL DAILY
COMMUNITY

## 2024-04-23 NOTE — PROGRESS NOTES
HPI  Jose Corado Jr comes in for transitional care.  Patient was admitted at [facility] from 03/29/2024-04/15/2024 for wide-complex tachycardia, electrolyte abnormality with hypokalemia, hypomagnesemia, LOCO secondary to diuretic use, right wrist arthritis.    Wide-complex tachycardia: Patient had wide complex tachycardia.  He was seen by the cardiologist.  This improved.  He needs to get cardiology follow-up.  He denies chest pain, palpitations or syncope.  Referral will be placed.  Patient has a history of atrial fibrillation.  He is on metoprolol and Eliquis.  Patient was seen by the cardiologist at the hospital.  Symptoms were due to the electrolyte imbalance.  These were corrected.  Patient feels stable and improved.  Hypokalemia: Patient noted to have hypokalemia at most recent admission.  He was given potassium replacement therapy.  I will recheck labs.  LOCO on CKD: Patient had acute on chronic CKD stage III while at the hospital.  I will recheck labs.  Plan is to avoid nephrotoxic medications.  CHF: Patient has history of chronic diastolic CHF.  He has been followed up by the cardiologist.  He has CardioMEMS in place.  He is on diuretic medication.  He had his medications adjusted recently.  He is on furosemide.  He is on metoprolol, spironolactone, metolazone as needed.  Most recent electrolytes were out of range but adjustments have been made.  Will recheck his basic metabolic panel.  He is on fluid restriction.  Denies chest tightness or lower extremity edema or wheeze.  Will continue medical management.  He will follow-up with a cardiologist.  Hypertension: Patient has hypertension.  Blood pressure is stable.  Denies headache, changes in vision or focal weakness.  Patient is on metoprolol, spironolactone.  Will continue current treatment plan.  CAD: Patient has coronary artery disease.  Denies chest pain or diaphoresis.  He is on metoprolol, spironolactone, aspirin, pravastatin,

## 2024-04-23 NOTE — PATIENT INSTRUCTIONS

## 2024-04-23 NOTE — PROGRESS NOTES
.\"Have you been to the ER, urgent care clinic since your last visit?  Hospitalized since your last visit?\"    YES - When: approximately 3 days ago.  Where and Why: ..    “Have you seen or consulted any other health care providers outside of [facility] since your last visit?”    NO            Click Here for Release of Records Request

## 2024-04-24 ENCOUNTER — HOME CARE VISIT (OUTPATIENT)
Age: 84
End: 2024-04-24

## 2024-04-25 ENCOUNTER — CARE COORDINATION (OUTPATIENT)
Facility: CLINIC | Age: 84
End: 2024-04-25

## 2024-04-25 ENCOUNTER — HOME CARE VISIT (OUTPATIENT)
Age: 84
End: 2024-04-25

## 2024-04-25 NOTE — CARE COORDINATION
Care Transitions Note    Follow Up Call      Attempted to reach patient for transitions of care follow up.  Unable to reach patient.      Outreach Attempts:   HIPAA compliant voicemail left for patient.   Attempted to  on HIPAA form. Unable to leave voicemail.    Care Summary Note: Patient attended appointment with PCP on 4/23/24 and referral was placed to cardiologist Dr. Pemberton, although patient is scheduled with Dr. Posey in July.    Follow Up Appointment:   Future Appointments         Provider Specialty Dept Phone    4/29/2024 9:00 AM Sreekanth Atkinson DO Orthopedic Surgery [phone]    6/10/2024 8:30 AM Felton Oneill MD Family Medicine [phone]    7/5/2024 8:45 AM Barbara Posey MD Cardiology [phone]    9/26/2024 10:30 AM Felton Oneill MD Family Medicine [phone]            Plan for follow-up call in 6-10 days based on severity of symptoms and risk factors. Plan for next call: symptom management-assess for change in gout and LOCO symptoms  self management-review and offer RPM program enrollment for CHF/CKD management    Verito Peralta, RN

## 2024-05-01 ENCOUNTER — CARE COORDINATION (OUTPATIENT)
Facility: CLINIC | Age: 84
End: 2024-05-01

## 2024-05-01 NOTE — CARE COORDINATION
Care Transitions Note    Follow Up Call      Attempted to reach patient for transitions of care follow up.  Unable to reach patient.      Outreach Attempts:   HIPAA compliant voicemail left for patient, spouse/partner , (PHI form verified; on file).     Care Summary Note: Patient attended appointment with PCP on 4/23/24 and referral was placed to cardiologist Dr. Pemberton, although patient is scheduled with Dr. Posey in July.     Follow Up Appointment:   Future Appointments         Provider Specialty Dept Phone    6/10/2024 8:00 AM Sreekanth Atkinson DO Orthopedic Surgery [phone]    6/10/2024 8:30 AM Felton Oneill MD Family Medicine [phone]    7/5/2024 8:45 AM Barbara Posey MD Cardiology [phone]    9/26/2024 10:30 AM Felton Oneill MD Family Medicine [phone]            Plan for follow-up call in 6-10 days based on severity of symptoms and risk factors. Plan for next call: symptom management-assess for change in gout and LOCO symptoms  self management-review and offer RPM program enrollment for CHF/CKD management    Verito Peralta RN

## 2024-05-08 ENCOUNTER — CARE COORDINATION (OUTPATIENT)
Facility: CLINIC | Age: 84
End: 2024-05-08

## 2024-05-08 NOTE — CARE COORDINATION
Care Transitions Note    Follow Up Call      Attempted to reach patient/spouse for transitions of care follow up.  Unable to reach patient.      Outreach Attempts:   Attempted to  on HIPAA form.   Unable to leave message.     Care Summary Note: Patient attended appointment with PCP on 4/23/24 and referral was placed to cardiologist Dr. Pemberton, although patient is scheduled with Dr. Posey in July.     Follow Up Appointment:   Future Appointments         Provider Specialty Dept Phone    5/10/2024 9:30 AM Natalie Bee APRN - [facility] Cardiology [phone]    6/10/2024 8:00 AM Sreekanth Atkinson,  Orthopedic Surgery [phone]    6/10/2024 8:30 AM Felton Oneill MD Family Medicine [phone]    7/5/2024 8:45 AM Barbara Posey MD Cardiology [phone]    9/26/2024 10:30 AM Felton Oneill MD Family Medicine [phone]            Plan for follow-up call in 11-14 days based on severity of symptoms and risk factors. Plan for next call: symptom management-assess for LOCO and gout red flags  self management-confirm and review daily weights  referrals-review and offer RPM enrollment    Verito Peralta RN          
Admission

## 2024-05-10 ENCOUNTER — OFFICE VISIT (OUTPATIENT)
Age: 84
End: 2024-05-10
Payer: MEDICARE

## 2024-05-10 ENCOUNTER — TELEPHONE (OUTPATIENT)
Facility: CLINIC | Age: 84
End: 2024-05-10

## 2024-05-10 VITALS
BODY MASS INDEX: 32.44 KG/M2 | WEIGHT: 219 LBS | HEART RATE: 93 BPM | SYSTOLIC BLOOD PRESSURE: 126 MMHG | DIASTOLIC BLOOD PRESSURE: 74 MMHG | OXYGEN SATURATION: 96 % | HEIGHT: 69 IN

## 2024-05-10 DIAGNOSIS — I48.19 OTHER PERSISTENT ATRIAL FIBRILLATION (HCC): ICD-10-CM

## 2024-05-10 DIAGNOSIS — Z79.01 LONG TERM (CURRENT) USE OF ANTICOAGULANTS: ICD-10-CM

## 2024-05-10 DIAGNOSIS — I10 ESSENTIAL HYPERTENSION: ICD-10-CM

## 2024-05-10 DIAGNOSIS — I25.700 CORONARY ARTERY DISEASE INVOLVING CORONARY BYPASS GRAFT OF NATIVE HEART WITH UNSTABLE ANGINA PECTORIS (HCC): ICD-10-CM

## 2024-05-10 DIAGNOSIS — Z95.1 PRESENCE OF AORTOCORONARY BYPASS GRAFT: ICD-10-CM

## 2024-05-10 DIAGNOSIS — I48.3 TYPICAL ATRIAL FLUTTER (HCC): ICD-10-CM

## 2024-05-10 DIAGNOSIS — I50.32 CHF (CONGESTIVE HEART FAILURE), NYHA CLASS III, CHRONIC, DIASTOLIC (HCC): ICD-10-CM

## 2024-05-10 DIAGNOSIS — I95.1 ORTHOSTATIC HYPOTENSION: Primary | ICD-10-CM

## 2024-05-10 LAB
ANION GAP SERPL CALCULATED.3IONS-SCNC: 9 MMOL/L (ref 3–15)
BUN BLDV-MCNC: 19 MG/DL (ref 6–22)
CALCIUM SERPL-MCNC: 9.9 MG/DL (ref 8.4–10.5)
CHLORIDE BLD-SCNC: 99 MMOL/L (ref 98–110)
CO2: 34 MMOL/L (ref 20–32)
CREAT SERPL-MCNC: 1.5 MG/DL (ref 0.8–1.6)
GFR, ESTIMATED: 44.5 ML/MIN/1.73 SQ.M.
GLUCOSE: 98 MG/DL (ref 70–99)
POTASSIUM SERPL-SCNC: 2.7 MMOL/L (ref 3.5–5.5)
SODIUM BLD-SCNC: 142 MMOL/L (ref 133–145)

## 2024-05-10 PROCEDURE — 1111F DSCHRG MED/CURRENT MED MERGE: CPT | Performed by: NURSE PRACTITIONER

## 2024-05-10 PROCEDURE — 1036F TOBACCO NON-USER: CPT | Performed by: NURSE PRACTITIONER

## 2024-05-10 PROCEDURE — 1123F ACP DISCUSS/DSCN MKR DOCD: CPT | Performed by: NURSE PRACTITIONER

## 2024-05-10 PROCEDURE — G8417 CALC BMI ABV UP PARAM F/U: HCPCS | Performed by: NURSE PRACTITIONER

## 2024-05-10 PROCEDURE — 99214 OFFICE O/P EST MOD 30 MIN: CPT | Performed by: NURSE PRACTITIONER

## 2024-05-10 PROCEDURE — G8427 DOCREV CUR MEDS BY ELIG CLIN: HCPCS | Performed by: NURSE PRACTITIONER

## 2024-05-10 PROCEDURE — 3078F DIAST BP <80 MM HG: CPT | Performed by: NURSE PRACTITIONER

## 2024-05-10 PROCEDURE — 3074F SYST BP LT 130 MM HG: CPT | Performed by: NURSE PRACTITIONER

## 2024-05-10 ASSESSMENT — PATIENT HEALTH QUESTIONNAIRE - PHQ9
2. FEELING DOWN, DEPRESSED OR HOPELESS: NOT AT ALL
SUM OF ALL RESPONSES TO PHQ9 QUESTIONS 1 & 2: 0
SUM OF ALL RESPONSES TO PHQ QUESTIONS 1-9: 0
1. LITTLE INTEREST OR PLEASURE IN DOING THINGS: NOT AT ALL
DEPRESSION UNABLE TO ASSESS: FUNCTIONAL CAPACITY MOTIVATION LIMITS ACCURACY
SUM OF ALL RESPONSES TO PHQ QUESTIONS 1-9: 0

## 2024-05-10 NOTE — TELEPHONE ENCOUNTER
Spoke with Noah dsouza [facility] and he states that patient had a critical potassium of 2.7.  I spoke with Dr. Em and patient was advised to be seen in the E/D and will call and follow up in office next week.

## 2024-05-17 ENCOUNTER — CARE COORDINATION (OUTPATIENT)
Facility: CLINIC | Age: 84
End: 2024-05-17

## 2024-05-17 NOTE — CARE COORDINATION
Care Transitions Note    Final Call      Attempted to reach patient/spouse for transitions of care follow up.  Unable to reach patient.      Outreach Attempts:   HIPAA compliant voicemail left for spouse/partner , (PHI form verified; on file).     Care Summary Note: Patient attended appt with cardiology on 5/10/24 and was seen in [facility] ED on 5/10/24 for hypokalemia.    Follow Up Appointment:   Future Appointments         Provider Specialty Dept Phone    6/10/2024 8:00 AM Sreekanth Atkinson DO Orthopedic Surgery [phone]    6/10/2024 8:30 AM Felton Oneill MD Family Medicine [phone]    7/5/2024 8:45 AM Barbara Posey MD Cardiology [phone]    9/26/2024 10:30 AM Felton Oneill MD Family Medicine [phone]            No further follow-up call indicated based on severity of symptoms and risk factors.     Verito Peralta RN

## 2024-05-20 ENCOUNTER — CARE COORDINATION (OUTPATIENT)
Facility: CLINIC | Age: 84
End: 2024-05-20

## 2024-05-20 NOTE — CARE COORDINATION
Care Transitions Note    Final Note      Patient closed (disengaged) from the Care Transitions program on 5/20/24.      Handoff:   Patient was not referred to the ACM team due to unable to contact patient.      Care Summary Note: CTN was only able to reach patient and spouse for initial care transitions outreach.      Upcoming Appointments:    Future Appointments         Provider Specialty Dept Phone    6/10/2024 8:00 AM Sreekanth Atkinson DO Orthopedic Surgery [phone]    6/10/2024 8:30 AM Felton Oneill MD Family Medicine [phone]    7/5/2024 8:45 AM Barbara Posey MD Cardiology [phone]    9/26/2024 10:30 AM Felton Oneill MD Family Medicine [phone]            Verito Peralta RN

## 2024-06-10 ENCOUNTER — OFFICE VISIT (OUTPATIENT)
Facility: CLINIC | Age: 84
End: 2024-06-10
Payer: MEDICARE

## 2024-06-10 VITALS
OXYGEN SATURATION: 95 % | DIASTOLIC BLOOD PRESSURE: 88 MMHG | TEMPERATURE: 98.2 F | WEIGHT: 225.6 LBS | HEIGHT: 69 IN | RESPIRATION RATE: 14 BRPM | BODY MASS INDEX: 33.41 KG/M2 | SYSTOLIC BLOOD PRESSURE: 136 MMHG

## 2024-06-10 DIAGNOSIS — E87.6 HYPOKALEMIA: ICD-10-CM

## 2024-06-10 DIAGNOSIS — I25.708 ATHEROSCLEROSIS OF CORONARY ARTERY BYPASS GRAFT(S), UNSPECIFIED, WITH OTHER FORMS OF ANGINA PECTORIS (HCC): ICD-10-CM

## 2024-06-10 DIAGNOSIS — F39 MOOD DISORDER (HCC): ICD-10-CM

## 2024-06-10 DIAGNOSIS — K59.00 CONSTIPATION, UNSPECIFIED CONSTIPATION TYPE: ICD-10-CM

## 2024-06-10 DIAGNOSIS — E78.5 HYPERLIPIDEMIA, UNSPECIFIED HYPERLIPIDEMIA TYPE: ICD-10-CM

## 2024-06-10 DIAGNOSIS — N18.30 STAGE 3 CHRONIC KIDNEY DISEASE, UNSPECIFIED WHETHER STAGE 3A OR 3B CKD (HCC): ICD-10-CM

## 2024-06-10 DIAGNOSIS — I48.91 ATRIAL FIBRILLATION, UNSPECIFIED TYPE (HCC): ICD-10-CM

## 2024-06-10 DIAGNOSIS — I50.32 CHRONIC DIASTOLIC (CONGESTIVE) HEART FAILURE (HCC): Primary | ICD-10-CM

## 2024-06-10 DIAGNOSIS — K21.9 GASTRO-ESOPHAGEAL REFLUX DISEASE WITHOUT ESOPHAGITIS: ICD-10-CM

## 2024-06-10 PROCEDURE — G8427 DOCREV CUR MEDS BY ELIG CLIN: HCPCS | Performed by: FAMILY MEDICINE

## 2024-06-10 PROCEDURE — 1123F ACP DISCUSS/DSCN MKR DOCD: CPT | Performed by: FAMILY MEDICINE

## 2024-06-10 PROCEDURE — 3075F SYST BP GE 130 - 139MM HG: CPT | Performed by: FAMILY MEDICINE

## 2024-06-10 PROCEDURE — 3078F DIAST BP <80 MM HG: CPT | Performed by: FAMILY MEDICINE

## 2024-06-10 PROCEDURE — 1036F TOBACCO NON-USER: CPT | Performed by: FAMILY MEDICINE

## 2024-06-10 PROCEDURE — 99214 OFFICE O/P EST MOD 30 MIN: CPT | Performed by: FAMILY MEDICINE

## 2024-06-10 PROCEDURE — G8417 CALC BMI ABV UP PARAM F/U: HCPCS | Performed by: FAMILY MEDICINE

## 2024-06-10 RX ORDER — PSEUDOEPHEDRINE HCL 30 MG
100 TABLET ORAL DAILY PRN
Qty: 90 CAPSULE | Refills: 1 | Status: SHIPPED | OUTPATIENT
Start: 2024-06-10

## 2024-06-10 RX ORDER — FUROSEMIDE 40 MG/1
40 TABLET ORAL DAILY
Qty: 90 TABLET | Refills: 1 | Status: SHIPPED | OUTPATIENT
Start: 2024-06-10

## 2024-06-10 SDOH — ECONOMIC STABILITY: INCOME INSECURITY: HOW HARD IS IT FOR YOU TO PAY FOR THE VERY BASICS LIKE FOOD, HOUSING, MEDICAL CARE, AND HEATING?: NOT HARD AT ALL

## 2024-06-10 SDOH — ECONOMIC STABILITY: FOOD INSECURITY: WITHIN THE PAST 12 MONTHS, THE FOOD YOU BOUGHT JUST DIDN'T LAST AND YOU DIDN'T HAVE MONEY TO GET MORE.: NEVER TRUE

## 2024-06-10 SDOH — ECONOMIC STABILITY: FOOD INSECURITY: WITHIN THE PAST 12 MONTHS, YOU WORRIED THAT YOUR FOOD WOULD RUN OUT BEFORE YOU GOT MONEY TO BUY MORE.: NEVER TRUE

## 2024-06-10 ASSESSMENT — PATIENT HEALTH QUESTIONNAIRE - PHQ9
SUM OF ALL RESPONSES TO PHQ QUESTIONS 1-9: 0
SUM OF ALL RESPONSES TO PHQ9 QUESTIONS 1 & 2: 0
SUM OF ALL RESPONSES TO PHQ QUESTIONS 1-9: 0
1. LITTLE INTEREST OR PLEASURE IN DOING THINGS: NOT AT ALL
SUM OF ALL RESPONSES TO PHQ QUESTIONS 1-9: 0
SUM OF ALL RESPONSES TO PHQ QUESTIONS 1-9: 0
2. FEELING DOWN, DEPRESSED OR HOPELESS: NOT AT ALL

## 2024-06-10 ASSESSMENT — ANXIETY QUESTIONNAIRES
5. BEING SO RESTLESS THAT IT IS HARD TO SIT STILL: NOT AT ALL
GAD7 TOTAL SCORE: 0
4. TROUBLE RELAXING: NOT AT ALL
7. FEELING AFRAID AS IF SOMETHING AWFUL MIGHT HAPPEN: NOT AT ALL
IF YOU CHECKED OFF ANY PROBLEMS ON THIS QUESTIONNAIRE, HOW DIFFICULT HAVE THESE PROBLEMS MADE IT FOR YOU TO DO YOUR WORK, TAKE CARE OF THINGS AT HOME, OR GET ALONG WITH OTHER PEOPLE: NOT DIFFICULT AT ALL
3. WORRYING TOO MUCH ABOUT DIFFERENT THINGS: NOT AT ALL
1. FEELING NERVOUS, ANXIOUS, OR ON EDGE: NOT AT ALL
2. NOT BEING ABLE TO STOP OR CONTROL WORRYING: NOT AT ALL

## 2024-06-10 NOTE — PROGRESS NOTES
\"Have you been to the ER, urgent care clinic since your last visit?  Hospitalized since your last visit?\"    YES - When: approximately 4  weeks ago.  Where and Why: [facility], Hypokalemia.    “Have you seen or consulted any other health care providers outside of [facility] since your last visit?”    NO           
hands  Extremities - pedal edema 1 +, intact peripheral pulses      Results  No results found for this visit on 06/10/24.    ASSESSMENT and PLAN    ICD-10-CM    1. Chronic diastolic (congestive) heart failure ([facility])  I50.32       2. Constipation, unspecified constipation type  K59.00 docusate (COLACE, DULCOLAX) 100 MG CAPS      3. Hypokalemia  E87.6 Basic Metabolic Panel      4. Atherosclerosis of coronary artery bypass graft(s), unspecified, with other forms of angina pectoris ([facility])  I25.708       5. Stage 3 chronic kidney disease, unspecified whether stage 3a or 3b CKD ([facility])  N18.30       6. Mood disorder ([facility])  F39       7. Atrial fibrillation, unspecified type ([facility])  I48.91       8. Hyperlipidemia, unspecified hyperlipidemia type  E78.5       9. Gastro-esophageal reflux disease without esophagitis  K21.9       lab results and schedule of future lab studies reviewed with patient  reviewed diet, exercise and weight control  cardiovascular risk and specific lipid/LDL goals reviewed  reviewed medications and side effects in detail      I have discussed the diagnosis with the patient and the intended plan of care as seen in the above orders. The patient has received an after-visit summary and questions were answered concerning future plans. I have discussed medication, side effects, and warnings with the patient in detail. The patient verbalized understanding and is in agreement with the plan of care. The patient will contact the office with any additional concerns.    Felton Oneill MD    PLEASE NOTE:   This document has been produced using voice recognition software. Unrecognized errors in transcription may be present

## 2024-06-10 NOTE — PATIENT INSTRUCTIONS

## 2024-07-15 ENCOUNTER — OFFICE VISIT (OUTPATIENT)
Age: 84
End: 2024-07-15
Payer: MEDICARE

## 2024-07-15 VITALS — HEIGHT: 69 IN | BODY MASS INDEX: 33.03 KG/M2 | WEIGHT: 223 LBS

## 2024-07-15 DIAGNOSIS — M17.12 PRIMARY OSTEOARTHRITIS OF LEFT KNEE: Primary | ICD-10-CM

## 2024-07-15 DIAGNOSIS — I25.700 CORONARY ARTERY DISEASE INVOLVING CORONARY BYPASS GRAFT OF NATIVE HEART WITH UNSTABLE ANGINA PECTORIS (HCC): ICD-10-CM

## 2024-07-15 DIAGNOSIS — Z95.1 S/P CABG X 4: ICD-10-CM

## 2024-07-15 DIAGNOSIS — I50.32 CHF (CONGESTIVE HEART FAILURE), NYHA CLASS III, CHRONIC, DIASTOLIC (HCC): ICD-10-CM

## 2024-07-15 DIAGNOSIS — E66.01 MORBID OBESITY (HCC): ICD-10-CM

## 2024-07-15 DIAGNOSIS — I10 ESSENTIAL HYPERTENSION: ICD-10-CM

## 2024-07-15 DIAGNOSIS — M17.11 PRIMARY OSTEOARTHRITIS OF RIGHT KNEE: ICD-10-CM

## 2024-07-15 PROCEDURE — 73562 X-RAY EXAM OF KNEE 3: CPT | Performed by: ORTHOPAEDIC SURGERY

## 2024-07-15 PROCEDURE — G8417 CALC BMI ABV UP PARAM F/U: HCPCS | Performed by: ORTHOPAEDIC SURGERY

## 2024-07-15 PROCEDURE — 99204 OFFICE O/P NEW MOD 45 MIN: CPT | Performed by: ORTHOPAEDIC SURGERY

## 2024-07-15 PROCEDURE — 1123F ACP DISCUSS/DSCN MKR DOCD: CPT | Performed by: ORTHOPAEDIC SURGERY

## 2024-07-15 PROCEDURE — 1036F TOBACCO NON-USER: CPT | Performed by: ORTHOPAEDIC SURGERY

## 2024-07-15 PROCEDURE — G8428 CUR MEDS NOT DOCUMENT: HCPCS | Performed by: ORTHOPAEDIC SURGERY

## 2024-07-15 RX ORDER — ACETAMINOPHEN 500 MG
1000 TABLET ORAL EVERY 8 HOURS PRN
Qty: 180 TABLET | Refills: 0 | Status: SHIPPED | OUTPATIENT
Start: 2024-07-15 | End: 2024-08-14

## 2024-07-15 RX ORDER — GABAPENTIN 300 MG/1
300 CAPSULE ORAL 3 TIMES DAILY
Qty: 180 CAPSULE | Refills: 0 | Status: SHIPPED | OUTPATIENT
Start: 2024-07-15 | End: 2024-09-13

## 2024-07-15 NOTE — PROGRESS NOTES
Patient: Jose Corado Jr                MRN: [id number]       SSN: [id number]  YOB: 1940        AGE: 84 y.o.        SEX: male  BMI: Body mass index is 32.93 kg/m².    PCP: Felton Oneill MD  07/15/24    Chief Complaint: Knee Pain (Umesh knee)      1. Primary osteoarthritis of left knee  -     AMB POC X-RAY KNEE 3 VIEW  -     gabapentin (NEURONTIN) 300 MG capsule; Take 1 capsule by mouth 3 times daily for 60 days. Max Daily Amount: 900 mg, Disp-180 capsule, R-0Normal  -     acetaminophen (TYLENOL) 500 MG tablet; Take 2 tablets by mouth every 8 hours as needed for Pain, Disp-180 tablet, R-0Normal  2. Primary osteoarthritis of right knee  -     AMB POC X-RAY KNEE 3 VIEW  -     gabapentin (NEURONTIN) 300 MG capsule; Take 1 capsule by mouth 3 times daily for 60 days. Max Daily Amount: 900 mg, Disp-180 capsule, R-0Normal  -     acetaminophen (TYLENOL) 500 MG tablet; Take 2 tablets by mouth every 8 hours as needed for Pain, Disp-180 tablet, R-0Normal  3. CHF (congestive heart failure), NYHA class III, chronic, diastolic (HCC)  4. Coronary artery disease involving coronary bypass graft of native heart with unstable angina pectoris (HCC)  5. Essential hypertension  6. S/P CABG x 4  7. Morbid obesity (HCC)        HPI:  Jose Corado Jr is a 84 y.o. male with chief complaint of   Chief Complaint   Patient presents with    Knee Pain     Umesh knee     New patient referred by Dr. Oneill for bilateral knee pain. He has been in the wheel chair most of the time for 3 years.  He has had frequent falls anytime he stops using the wheelchair.  While he is in the wheelchair he does not have any pain in his knees and he can use his knees to roll around on a rolling stool to do tasks around the house.    PMH significant for CHF CAD, atrial fibrillation on Eliquis, hypertension, HLD, chronic kidney disease, diabetes mellitus (A1c 6.4 on 1/18/2024)    IMAGING:  Imaging read by myself and interpreted as

## 2024-07-23 ENCOUNTER — OFFICE VISIT (OUTPATIENT)
Facility: CLINIC | Age: 84
End: 2024-07-23
Payer: MEDICARE

## 2024-07-23 VITALS
TEMPERATURE: 97.8 F | SYSTOLIC BLOOD PRESSURE: 114 MMHG | BODY MASS INDEX: 32.88 KG/M2 | HEART RATE: 72 BPM | DIASTOLIC BLOOD PRESSURE: 82 MMHG | RESPIRATION RATE: 20 BRPM | HEIGHT: 69 IN | WEIGHT: 222 LBS | OXYGEN SATURATION: 97 %

## 2024-07-23 DIAGNOSIS — G47.30 SLEEP APNEA, UNSPECIFIED TYPE: ICD-10-CM

## 2024-07-23 DIAGNOSIS — G89.29 CHRONIC PAIN OF BOTH KNEES: ICD-10-CM

## 2024-07-23 DIAGNOSIS — G62.9 NEUROPATHY: ICD-10-CM

## 2024-07-23 DIAGNOSIS — I10 ESSENTIAL (PRIMARY) HYPERTENSION: Primary | ICD-10-CM

## 2024-07-23 DIAGNOSIS — E87.6 HYPOKALEMIA: ICD-10-CM

## 2024-07-23 DIAGNOSIS — F39 MOOD DISORDER (HCC): ICD-10-CM

## 2024-07-23 DIAGNOSIS — J44.9 CHRONIC OBSTRUCTIVE PULMONARY DISEASE, UNSPECIFIED COPD TYPE (HCC): ICD-10-CM

## 2024-07-23 DIAGNOSIS — M25.562 CHRONIC PAIN OF BOTH KNEES: ICD-10-CM

## 2024-07-23 DIAGNOSIS — I50.32 CHRONIC DIASTOLIC (CONGESTIVE) HEART FAILURE (HCC): ICD-10-CM

## 2024-07-23 DIAGNOSIS — M25.561 CHRONIC PAIN OF BOTH KNEES: ICD-10-CM

## 2024-07-23 PROBLEM — Z77.29 CONTACT WITH AND (SUSPECTED) EXPOSURE TO OTHER HAZARDOUS SUBSTANCES: Status: ACTIVE | Noted: 2024-07-23

## 2024-07-23 PROBLEM — Z77.29 EXPOSURE TO POTENTIALLY HAZARDOUS SUBSTANCE: Status: ACTIVE | Noted: 2024-01-17

## 2024-07-23 PROBLEM — R89.9 ABNORMAL LABORATORY TEST RESULT: Status: ACTIVE | Noted: 2024-05-10

## 2024-07-23 PROCEDURE — 3079F DIAST BP 80-89 MM HG: CPT | Performed by: FAMILY MEDICINE

## 2024-07-23 PROCEDURE — G8417 CALC BMI ABV UP PARAM F/U: HCPCS | Performed by: FAMILY MEDICINE

## 2024-07-23 PROCEDURE — G8427 DOCREV CUR MEDS BY ELIG CLIN: HCPCS | Performed by: FAMILY MEDICINE

## 2024-07-23 PROCEDURE — 3074F SYST BP LT 130 MM HG: CPT | Performed by: FAMILY MEDICINE

## 2024-07-23 PROCEDURE — 1036F TOBACCO NON-USER: CPT | Performed by: FAMILY MEDICINE

## 2024-07-23 PROCEDURE — 1123F ACP DISCUSS/DSCN MKR DOCD: CPT | Performed by: FAMILY MEDICINE

## 2024-07-23 PROCEDURE — 99215 OFFICE O/P EST HI 40 MIN: CPT | Performed by: FAMILY MEDICINE

## 2024-07-23 PROCEDURE — 3023F SPIROM DOC REV: CPT | Performed by: FAMILY MEDICINE

## 2024-07-23 RX ORDER — ALBUTEROL SULFATE 90 UG/1
1 AEROSOL, METERED RESPIRATORY (INHALATION) EVERY 4 HOURS PRN
Qty: 18 G | Refills: 5 | Status: SHIPPED | OUTPATIENT
Start: 2024-07-23

## 2024-07-23 RX ORDER — FLUTICASONE FUROATE, UMECLIDINIUM BROMIDE AND VILANTEROL TRIFENATATE 200; 62.5; 25 UG/1; UG/1; UG/1
1 POWDER RESPIRATORY (INHALATION) DAILY
Qty: 3 EACH | Refills: 5 | Status: SHIPPED | OUTPATIENT
Start: 2024-07-23

## 2024-07-23 RX ORDER — AMLODIPINE BESYLATE 10 MG/1
TABLET ORAL
COMMUNITY
Start: 2024-06-28

## 2024-07-23 RX ORDER — LIDOCAINE 50 MG/G
PATCH TOPICAL
COMMUNITY
Start: 2024-01-23

## 2024-07-23 NOTE — PROGRESS NOTES
1. \"Have you been to the ER, urgent care clinic since your last visit?  Hospitalized since your last visit?\"No    2. \"Have you seen or consulted any other health care providers outside of the [facility] since your last visit?\" No    3. For patients aged 45-75: Has the patient had a colonoscopy / FIT/ Cologuard? Not applicable      If the patient is female:    4. For patients aged 40-74: Has the patient had a mammogram within the past 2 years? Not applicable      5. For patients aged 21-65: Has the patient had a pap smear? Not applicable

## 2024-07-23 NOTE — PROGRESS NOTES
HPI  Jose Corado Jr comes in for follow-up care.  He is accompanied by his wife.  Hypertension: Patient has hypertension.  Blood pressure is stable.  He is on amlodipine, spironolactone, metoprolol.  He is stable on these medications.  Will continue current treatment plan.  He will take a low-sodium diet.  COPD: Patient has COPD.  Gets occasional shortness of breath and wheeze.  Denies hemoptysis.  Currently feels stable.  He uses his inhaler medication with good result.  He is on Trelegy Ellipta and on albuterol inhaler.  Would like a refill of medications.  Prescriptions are sent in.  CHF: Patient has chronic systolic CHF.  He is on furosemide, spironolactone, metoprolol, metolazone as needed, aspirin and has nitroglycerin to also use as needed.  He has poor exercise tolerance.  Weight has been stable he states.  He is doing lifestyle and dietary modification.  He watches his fluid intake.  He will continue current medical management.  Hypokalemia: Patient has a history of hypokalemia.  Will recheck labs.  Mood disorder: Patient has mood disorder.  This is with anxiety and mood swings.  He is on mirtazapine and sertraline.  Stable on these medications.  Continue current treatment plan.  Knee pain: Patient has bilateral knee pain.  He has been seen by the orthopedist.  Noted to have osteoarthritis both knees.  He is doing supportive care.  Patient states that he was told he is not a good candidate for knee replacement surgery.  He will is also apprehensive about getting any knee replacement surgery.  We will continue current management plan.  He applies diclofenac gel and this helped with the pain.  He is trying to be more active as this also helps minimize the pain.  Neuropathy: Patient has neuropathy with numbness and tingling lower extremities.  He is on gabapentin.  Stable on medication.  Continue current treatment plan.  Sleep apnea: Patient has sleep apnea.  He has been followed up by the sleep

## 2024-07-31 ENCOUNTER — CLINICAL DOCUMENTATION (OUTPATIENT)
Facility: CLINIC | Age: 84
End: 2024-07-31

## 2024-07-31 NOTE — PROGRESS NOTES
Last office notes, patient demographics and order for comressor and nebulizer supplies sent to [facility]

## 2024-08-16 ENCOUNTER — CARE COORDINATION (OUTPATIENT)
Facility: CLINIC | Age: 84
End: 2024-08-16

## 2024-08-16 DIAGNOSIS — I50.32 CHF (CONGESTIVE HEART FAILURE), NYHA CLASS III, CHRONIC, DIASTOLIC (HCC): Primary | ICD-10-CM

## 2024-08-16 DIAGNOSIS — E11.21 TYPE 2 DIABETES WITH NEPHROPATHY (HCC): ICD-10-CM

## 2024-08-16 DIAGNOSIS — I10 HYPERTENSION, UNSPECIFIED TYPE: ICD-10-CM

## 2024-08-16 ASSESSMENT — PATIENT HEALTH QUESTIONNAIRE - PHQ9
2. FEELING DOWN, DEPRESSED OR HOPELESS: NOT AT ALL
SUM OF ALL RESPONSES TO PHQ QUESTIONS 1-9: 0
1. LITTLE INTEREST OR PLEASURE IN DOING THINGS: NOT AT ALL
SUM OF ALL RESPONSES TO PHQ QUESTIONS 1-9: 0
SUM OF ALL RESPONSES TO PHQ9 QUESTIONS 1 & 2: 0

## 2024-08-16 NOTE — PROGRESS NOTES
Remote Patient Monitoring Treatment Plan    Received request from ACM/Asif Herrera, LITO   to order remote patient monitoring for in home monitoring of CHF, DM and HTN; condition managed by Dr. Oneill and order completed.     Patient will be monitoring blood pressure   glucose  pulse ox   weight.      Patient will engage in Remote Patient Monitoring each day to develop the skills necessary for self management.       RPM Care Team Responsibilities:   Alerts will be reviewed daily and addressed within 2-4 hours during operational hours (Monday -Friday 9 am-4 pm)  Alert response and intervention documented in patient medical record  Alert response escalated to PCP per protocol and documented in patient medical record  Patient monitored over approximately  days  Discharge from program based on self-management readiness    See care coordination encounters for additional details.

## 2024-08-16 NOTE — CARE COORDINATION
Ambulatory Care Coordination Note     2024 2:02 PM     Patient Current Location:  Home:  [facility] [address]     This patient was received as a referral from Care Transition Nurse.    ACM contacted the patient by telephone. Verified name and  with patient as identifiers. Provided introduction to self, and explanation of the ACM role.   Patient accepted care management services at this time.          ACM: Asif Hernandez RN     Challenges to be reviewed by the provider   Additional needs identified to be addressed with provider No  none               Method of communication with provider: phone.    Care Summary Note:   Patient enrolled in Complex Case Management effective 2024 and will be followed per AC protocol. Initial questions answered with subsequent encounters planned.   Further / follow up appointments listed below - reviewed upcoming appointments  Further questions answered as needed and patient has ACM contact information  Initial review of medications with attention to any side effects and determination that patient has sufficient quantity of meds and/or refills.  Initial assessment done with attention to primary illness and / or condition.  Respiratory and cardiac status shows no issues at present    Offered patient enrollment in the Remote Patient Monitoring (RPM) program for in-home monitoring: Yes, patient enrolled today:     Remote Patient Monitoring Enrollment Note    Date/Time:  2024 2:03 PM    Offered patient enrollment in the [facility] Remote Patient Monitoring (RPM) program for in home monitoring for CHF; condition managed by Dr. Oneill. Diabetes; condition managed by Dr. Oneill. HTN; condition managed by Dr. Oneill.  Patient accepted.    Patient will be monitoring the following daily:  Blood Glucose, Blood Pressure, Pulse ox, and Weight    ACM reviewed the information below with the patient:    Emergency Contact (name and contact number):

## 2024-08-19 ENCOUNTER — CARE COORDINATION (OUTPATIENT)
Facility: CLINIC | Age: 84
End: 2024-08-19

## 2024-08-19 NOTE — CARE COORDINATION
Remote Patient Kit Ordering Note      Date/Time:  8/19/2024 1:21 PM      CCSS placed phone call to patient/family today to notify of RPM kit order; patient/family was available; discussed the following topics below and all questions answered.    [x] CCSS confirmed patient shipping address  [x] Patient will receive package over the next 1-3 business days. Someone 21 years or older must be present to sign for UPS delivery.  [x] HRS will contact patient within 24 hours, an HRS  will call the patient directly: If the patient does not answer, HRS will follow up with the clinical team notifying them about the unsuccessful attempt to contact the patient. HRS will make three call attempts to the patient.Provide patient with [facility] Virtual install number is: [phone].  [x] ACM will contact patient once equipment is active to welcome them to the program.                                                         [x] Hours of RPM monitoring - Monday-Friday [phone]; encourage patient to get vitals entered by Noon each day to have the alert addressed same day.  [x[facility]                      ACM made aware the RPM kit has been ordered.

## 2024-08-22 ENCOUNTER — CARE COORDINATION (OUTPATIENT)
Facility: CLINIC | Age: 84
End: 2024-08-22

## 2024-08-22 NOTE — CARE COORDINATION
Appointments         Provider Specialty Dept Phone    9/26/2024 10:30 AM Felton Oneill MD Family Medicine [phone]            Follow Up:   Plan for next ACM outreach in approximately 1 week to complete:  - medication review   - advance care planning   - goal progression  - education .   Caregiver is agreeable to this plan.

## 2024-08-30 ENCOUNTER — CARE COORDINATION (OUTPATIENT)
Facility: CLINIC | Age: 84
End: 2024-08-30

## 2024-08-30 NOTE — CARE COORDINATION
Ambulatory Care Coordination Note     8/30/2024 3:06 PM     Patient outreach attempt by this ACM today to perform care management follow up . ACM was unable to reach the patient by telephone today; left voice message requesting a return phone call to this ACM.     ACM: Asif Hernandez RN     Care Summary Note: NA    PCP/Specialist follow up:   Future Appointments         Provider Specialty Dept Phone    9/26/2024 10:30 AM Felton Oneill MD Family Medicine [phone]            Follow Up:   Plan for next ACM outreach in approximately 1 week to complete:  - medication review  - advance care planning  - goal progression  - education .

## 2024-09-03 VITALS — BODY MASS INDEX: 13.26 KG/M2 | WEIGHT: 89.8 LBS

## 2024-09-06 ENCOUNTER — CARE COORDINATION (OUTPATIENT)
Facility: CLINIC | Age: 84
End: 2024-09-06

## 2024-09-06 NOTE — CARE COORDINATION
Ambulatory Care Coordination Note     9/6/2024 2:54 PM     Patient outreach attempt by this ACM today to perform care management follow up . ACM was unable to reach the patient by telephone today; voicemail full and unable to leave a message.      ACM: Asif Hernandez RN     Care Summary Note: NA    PCP/Specialist follow up:   Future Appointments         Provider Specialty Dept Phone    9/26/2024 10:30 AM Felton Oneill MD Family Medicine [phone]            Follow Up:   Plan for next ACM outreach in approximately 1 week to complete:  - medication review  - advance care planning  - goal progression  - education .

## 2024-09-11 ENCOUNTER — CARE COORDINATION (OUTPATIENT)
Dept: CARE COORDINATION | Age: 84
End: 2024-09-11

## 2024-09-13 ENCOUNTER — CARE COORDINATION (OUTPATIENT)
Facility: CLINIC | Age: 84
End: 2024-09-13

## 2024-09-18 ENCOUNTER — CARE COORDINATION (OUTPATIENT)
Dept: CARE COORDINATION | Age: 84
End: 2024-09-18

## 2024-09-20 ENCOUNTER — CARE COORDINATION (OUTPATIENT)
Dept: CARE COORDINATION | Age: 84
End: 2024-09-20

## 2024-09-23 ENCOUNTER — CARE COORDINATION (OUTPATIENT)
Facility: CLINIC | Age: 84
End: 2024-09-23

## 2024-09-26 ENCOUNTER — OFFICE VISIT (OUTPATIENT)
Facility: CLINIC | Age: 84
End: 2024-09-26
Payer: MEDICARE

## 2024-09-26 VITALS
RESPIRATION RATE: 18 BRPM | BODY MASS INDEX: 35.1 KG/M2 | HEIGHT: 69 IN | SYSTOLIC BLOOD PRESSURE: 126 MMHG | DIASTOLIC BLOOD PRESSURE: 76 MMHG | OXYGEN SATURATION: 96 % | HEART RATE: 76 BPM | WEIGHT: 237 LBS | TEMPERATURE: 97.3 F

## 2024-09-26 DIAGNOSIS — E78.5 HYPERLIPIDEMIA, UNSPECIFIED HYPERLIPIDEMIA TYPE: ICD-10-CM

## 2024-09-26 DIAGNOSIS — I50.32 CHRONIC DIASTOLIC (CONGESTIVE) HEART FAILURE (HCC): ICD-10-CM

## 2024-09-26 DIAGNOSIS — M17.11 PRIMARY OSTEOARTHRITIS OF RIGHT KNEE: ICD-10-CM

## 2024-09-26 DIAGNOSIS — M17.12 PRIMARY OSTEOARTHRITIS OF LEFT KNEE: ICD-10-CM

## 2024-09-26 DIAGNOSIS — G47.00 INSOMNIA, UNSPECIFIED TYPE: ICD-10-CM

## 2024-09-26 DIAGNOSIS — K21.9 GASTRO-ESOPHAGEAL REFLUX DISEASE WITHOUT ESOPHAGITIS: ICD-10-CM

## 2024-09-26 DIAGNOSIS — Z00.00 MEDICARE ANNUAL WELLNESS VISIT, SUBSEQUENT: Primary | ICD-10-CM

## 2024-09-26 DIAGNOSIS — I25.708 ATHEROSCLEROSIS OF CORONARY ARTERY BYPASS GRAFT(S), UNSPECIFIED, WITH OTHER FORMS OF ANGINA PECTORIS (HCC): ICD-10-CM

## 2024-09-26 DIAGNOSIS — Z23 NEED FOR IMMUNIZATION AGAINST INFLUENZA: ICD-10-CM

## 2024-09-26 DIAGNOSIS — I10 ESSENTIAL (PRIMARY) HYPERTENSION: ICD-10-CM

## 2024-09-26 DIAGNOSIS — I48.91 ATRIAL FIBRILLATION, UNSPECIFIED TYPE (HCC): ICD-10-CM

## 2024-09-26 DIAGNOSIS — F39 MOOD DISORDER (HCC): ICD-10-CM

## 2024-09-26 PROCEDURE — G8417 CALC BMI ABV UP PARAM F/U: HCPCS | Performed by: FAMILY MEDICINE

## 2024-09-26 PROCEDURE — 1123F ACP DISCUSS/DSCN MKR DOCD: CPT | Performed by: FAMILY MEDICINE

## 2024-09-26 PROCEDURE — 99215 OFFICE O/P EST HI 40 MIN: CPT | Performed by: FAMILY MEDICINE

## 2024-09-26 PROCEDURE — 1036F TOBACCO NON-USER: CPT | Performed by: FAMILY MEDICINE

## 2024-09-26 PROCEDURE — 3078F DIAST BP <80 MM HG: CPT | Performed by: FAMILY MEDICINE

## 2024-09-26 PROCEDURE — 3074F SYST BP LT 130 MM HG: CPT | Performed by: FAMILY MEDICINE

## 2024-09-26 PROCEDURE — G8427 DOCREV CUR MEDS BY ELIG CLIN: HCPCS | Performed by: FAMILY MEDICINE

## 2024-09-26 PROCEDURE — 90653 IIV ADJUVANT VACCINE IM: CPT | Performed by: FAMILY MEDICINE

## 2024-09-26 PROCEDURE — G0008 ADMIN INFLUENZA VIRUS VAC: HCPCS | Performed by: FAMILY MEDICINE

## 2024-09-26 PROCEDURE — G0439 PPPS, SUBSEQ VISIT: HCPCS | Performed by: FAMILY MEDICINE

## 2024-09-26 RX ORDER — FUROSEMIDE 40 MG
40 TABLET ORAL DAILY
Qty: 90 TABLET | Refills: 1 | Status: SHIPPED | OUTPATIENT
Start: 2024-09-26

## 2024-09-26 RX ORDER — MIRTAZAPINE 7.5 MG/1
7.5 TABLET, FILM COATED ORAL NIGHTLY
Qty: 90 TABLET | Refills: 1 | Status: SHIPPED | OUTPATIENT
Start: 2024-09-26

## 2024-09-26 RX ORDER — GABAPENTIN 300 MG/1
300 CAPSULE ORAL 3 TIMES DAILY
Qty: 180 CAPSULE | Refills: 0 | Status: SHIPPED | OUTPATIENT
Start: 2024-09-26 | End: 2024-11-25

## 2024-09-26 SDOH — ECONOMIC STABILITY: FOOD INSECURITY: WITHIN THE PAST 12 MONTHS, THE FOOD YOU BOUGHT JUST DIDN'T LAST AND YOU DIDN'T HAVE MONEY TO GET MORE.: NEVER TRUE

## 2024-09-26 SDOH — ECONOMIC STABILITY: FOOD INSECURITY: WITHIN THE PAST 12 MONTHS, YOU WORRIED THAT YOUR FOOD WOULD RUN OUT BEFORE YOU GOT MONEY TO BUY MORE.: NEVER TRUE

## 2024-09-26 SDOH — ECONOMIC STABILITY: INCOME INSECURITY: HOW HARD IS IT FOR YOU TO PAY FOR THE VERY BASICS LIKE FOOD, HOUSING, MEDICAL CARE, AND HEATING?: NOT HARD AT ALL

## 2024-09-26 ASSESSMENT — PATIENT HEALTH QUESTIONNAIRE - PHQ9
2. FEELING DOWN, DEPRESSED OR HOPELESS: SEVERAL DAYS
SUM OF ALL RESPONSES TO PHQ9 QUESTIONS 1 & 2: 2
SUM OF ALL RESPONSES TO PHQ QUESTIONS 1-9: 2
1. LITTLE INTEREST OR PLEASURE IN DOING THINGS: SEVERAL DAYS

## 2024-09-26 NOTE — PROGRESS NOTES
\"Have you been to the ER, urgent care clinic since your last visit?  Hospitalized since your last visit?\"    NO    “Have you seen or consulted any other health care providers outside our system since your last visit?”    NO        Click Here for Release of Records Request    no

## 2024-09-26 NOTE — PROGRESS NOTES
HPI  Jose Corado Jr comes in for follow-up care.  CHF: Patient has chronic systolic and diastolic CHF.  He has not been compliant with diuretics.  Has not taken furosemide as prescribed.  Wife has him back on the medication.  He is now on the furosemide daily and also metolazone.  He will keep a weight log.  Will follow-up at next visit.  He is also on metoprolol succinate 25 mg daily, spironolactone 25 mg daily, aspirin.  CAD: Patient has coronary artery disease.  Denies chest pain or diaphoresis.  He is on aspirin, Eliquis, pravastatin, metoprolol, spironolactone.  Continue current treatment plan.  Hypertension: Patient has hypertension.  Blood pressure is stable.  Patient is on amlodipine, spironolactone, metoprolol.  Continue current treatment plan.  Dyslipidemia: Patient has dyslipidemia.  He is on pravastatin.  Takes 20 mg daily.  He will take a diet low in polysaturated fats.  I will recheck labs at next visit.  GERD: Patient has gastroesophageal reflux disease.  Gets heartburn on and off.  Denies dark stools or hematemesis.  Patient is on Protonix 40 mg daily.  Will continue current treatment plan.  Atrial fibrillation: Patient has atrial fibrillation.  He has CardioMEMS implant in place.  Denies palpitations or syncope.  Patient is on metoprolol, Eliquis.  Continue with the current treatment plan.  Mood disorder: Patient has mood disorder.  He is on sertraline and mirtazapine.  Stable on medication.  Continue current treatment plan.  Iron deficiency: Patient has iron deficiency anemia.  He is on iron replacement therapy.  Continue current treatment plan.  Will recheck labs at next visit.  Insomnia: Patient has insomnia.  He has used mirtazapine in the past with good result.  I will send a refill of medication.  Neuropathy: Patient has neuropathy with numbness and tingling of the lower extremities.  He is on gabapentin for this.  Continue current treatment plan.  Osteoarthritis: Patient has

## 2024-09-30 NOTE — PROGRESS NOTES
Medicare Annual Wellness Visit    Jose Corado Jr is here for Medicare AWV    Assessment & Plan    Diagnosis Orders   1. Medicare annual wellness visit, subsequent        2. Primary osteoarthritis of left knee  gabapentin (NEURONTIN) 300 MG capsule      3. Primary osteoarthritis of right knee  gabapentin (NEURONTIN) 300 MG capsule      4. Insomnia, unspecified type  mirtazapine (REMERON) 7.5 MG tablet      5. Mood disorder (HCC)  mirtazapine (REMERON) 7.5 MG tablet      6. Need for immunization against influenza  Influenza, FLUAD Trivalent, (age 65 y+), IM, Preservative Free, 0.5mL      7. Chronic diastolic (congestive) heart failure (HCC)        8. Essential (primary) hypertension        9. Atrial fibrillation, unspecified type (HCC)        10. Atherosclerosis of coronary artery bypass graft(s), unspecified, with other forms of angina pectoris (HCC)        11. Hyperlipidemia, unspecified hyperlipidemia type        12. Gastro-esophageal reflux disease without esophagitis          Recommendations for Preventive Services Due: see orders and patient instructions/AVS.  Recommended screening schedule for the next 5-10 years is provided to the patient in written form: see Patient Instructions/AVS.     Return in about 1 month (around 10/26/2024), or if symptoms worsen or fail to improve, for CHF, Hypertension.     Subjective   Jose Corado Jr comes in for Medicare wellness exam.      Patient's complete Health Risk Assessment and screening values have been reviewed and are found in Flowsheets. The following problems were reviewed today and where indicated follow up appointments were made and/or referrals ordered.    Positive Risk Factor Screenings with Interventions:                Inactivity:    (!) Abnormal     Interventions:  Patient advised to follow up in the office for further evaluation and treatment     Abnormal BMI (obese):  Body mass index is 35 kg/m². (!) Abnormal  Interventions:  Patient advised to

## 2024-10-08 ENCOUNTER — CARE COORDINATION (OUTPATIENT)
Facility: CLINIC | Age: 84
End: 2024-10-08

## 2024-10-08 NOTE — CARE COORDINATION
Ambulatory Care Coordination Note     10/8/2024 2:42 PM     Patient outreach attempt by this ACM today to perform care management follow up . ACM was unable to reach the patient by telephone today; left voice message requesting a return phone call to this ACM.     ACM: Asif Hernandez RN     Care Summary Note: NA    PCP/Specialist follow up:   Future Appointments         Provider Specialty Dept Phone    10/28/2024 8:15 AM Felton Oneill MD Family Medicine [phone]    9/29/2025 10:30 AM Felton Oneill MD Family Medicine [phone]            Follow Up:   Plan for next ACM outreach in approximately 2 weeks to complete:  - medication review  - advance care planning  - goal progression  - education .

## 2024-10-09 ENCOUNTER — CARE COORDINATION (OUTPATIENT)
Dept: CARE COORDINATION | Age: 84
End: 2024-10-09

## 2024-10-09 NOTE — CARE COORDINATION
Patient remains paused in RPM since 9/20 due to inactivity/did not answer HRS install calls or return calls. Please send D/C order to the pool, thank you

## 2024-10-21 ENCOUNTER — CARE COORDINATION (OUTPATIENT)
Facility: CLINIC | Age: 84
End: 2024-10-21

## 2024-10-21 DIAGNOSIS — I10 ESSENTIAL HYPERTENSION: ICD-10-CM

## 2024-10-21 DIAGNOSIS — I50.32 CHF (CONGESTIVE HEART FAILURE), NYHA CLASS III, CHRONIC, DIASTOLIC (HCC): Primary | ICD-10-CM

## 2024-10-21 DIAGNOSIS — E11.21 TYPE 2 DIABETES WITH NEPHROPATHY (HCC): ICD-10-CM

## 2024-10-21 NOTE — CARE COORDINATION
Ambulatory Care Coordination Note     10/21/2024 4:56 PM     Patient outreach attempt by this ACM today to perform care management follow up . ACM was unable to reach the patient by telephone today; left voice message requesting a return phone call to this ACM.     ACM: Asif Hernandez RN     Care Summary Note: RPM S/C order placed    PCP/Specialist follow up:   Future Appointments         Provider Specialty Dept Phone    10/28/2024 8:15 AM Felton Oneill MD Family Medicine [phone]    9/29/2025 10:30 AM Felton Oneill MD Family Medicine [phone]            Follow Up:   Plan for next ACM outreach in approximately 1 week to complete:  - medication review  - advance care planning  - goal progression  - education .

## 2024-10-22 NOTE — CARE COORDINATION
Patient Jose Corado   10/22/24     Care Coordination  placed call to patient to arrange RPM kit  through UPS.  VM cut off after recording briefly started. Unable to leave VM      provided return and how to pack equipment in original packing via the patients voicemail if available and provided call back number should patient have questions.    Patient made aware UPS will  equipment in 2-4 days.

## 2024-10-22 NOTE — PROGRESS NOTES
Remote Patient Order Discontinued    Received request from Asif Hernandez RN   to discontinue order for remote patient monitoring of CHF, DM and HTN and order completed.

## 2024-10-30 ENCOUNTER — CARE COORDINATION (OUTPATIENT)
Facility: CLINIC | Age: 84
End: 2024-10-30

## 2024-10-30 NOTE — CARE COORDINATION
Patient attending appointment with PCP or Specialists at time of scheduled ACM follow up. Follow up call deferred at this time and ACM will contact Patient at the next scheduled encounter unless earlier encounter is necessary.    PCP appointment

## 2024-11-07 ENCOUNTER — CARE COORDINATION (OUTPATIENT)
Facility: CLINIC | Age: 84
End: 2024-11-07

## 2024-11-07 NOTE — CARE COORDINATION
Ambulatory Care Coordination Note     11/7/2024 4:32 PM     Patient outreach attempt by this ACM today to perform care management follow up . ACM was unable to reach the patient by telephone today; left voice message requesting a return phone call to this ACM.     ACM: Asif Hernandez RN     Care Summary Note: NA    PCP/Specialist follow up:   Future Appointments         Provider Specialty Dept Phone    9/29/2025 10:30 AM Felton Oneill MD Family Medicine [phone]            Follow Up:   Plan for next ACM outreach in approximately 1 week to complete:  - medication review  - advance care planning  - goal progression  - education .

## 2024-11-13 ENCOUNTER — CARE COORDINATION (OUTPATIENT)
Facility: CLINIC | Age: 84
End: 2024-11-13

## 2024-11-13 DIAGNOSIS — I10 ESSENTIAL HYPERTENSION: ICD-10-CM

## 2024-11-13 DIAGNOSIS — I50.32 CHF (CONGESTIVE HEART FAILURE), NYHA CLASS III, CHRONIC, DIASTOLIC (HCC): Primary | ICD-10-CM

## 2024-11-13 DIAGNOSIS — E11.65 TYPE 2 DIABETES MELLITUS WITH HYPERGLYCEMIA, WITHOUT LONG-TERM CURRENT USE OF INSULIN (HCC): ICD-10-CM

## 2024-11-13 ASSESSMENT — PATIENT HEALTH QUESTIONNAIRE - PHQ9
2. FEELING DOWN, DEPRESSED OR HOPELESS: SEVERAL DAYS
SUM OF ALL RESPONSES TO PHQ QUESTIONS 1-9: 2
SUM OF ALL RESPONSES TO PHQ QUESTIONS 1-9: 2
1. LITTLE INTEREST OR PLEASURE IN DOING THINGS: SEVERAL DAYS
SUM OF ALL RESPONSES TO PHQ QUESTIONS 1-9: 2
SUM OF ALL RESPONSES TO PHQ QUESTIONS 1-9: 2
SUM OF ALL RESPONSES TO PHQ9 QUESTIONS 1 & 2: 2

## 2024-11-13 NOTE — PROGRESS NOTES
Remote Patient Monitoring Treatment Plan    Received request from ACM/Asif Herrera, LITO       to order remote patient monitoring for in home monitoring of CHF, DM and HTN; Condition managed by PCP.  and order completed.     Patient will be monitoring blood pressure   glucose  pulse ox   weight.      Patient will engage in Remote Patient Monitoring each day to develop the skills necessary for self management.       RPM Care Team Responsibilities:   Alerts will be reviewed daily and addressed within 2-4 hours during operational hours (Monday -Friday 9 am-4 pm)  Alert response and intervention documented in patient medical record  Alert response escalated to PCP per protocol and documented in patient medical record  Patient monitored over approximately  days  Discharge from program based on self-management readiness    See care coordination encounters for additional details.

## 2024-11-13 NOTE — CARE COORDINATION
Ambulatory Care Coordination Note     2024 11:29 AM     Patient Current Location:  Home:  [facility] [address]     ACM contacted the patient by telephone. Verified name and  with patient as identifiers.         ACM: Asif Hernandez RN     Challenges to be reviewed by the provider   Additional needs identified to be addressed with provider No  none               Method of communication with provider: phone.    Utilization: Patient has not had any utilization since our last call.     Care Summary Note:   Further / follow up appointments listed below - reviewed upcoming appointments  Further questions answered as needed and patient has ACM contact information  Review of medications with attention to any side effects and determination that patient has sufficient quantity of meds and/or refills.  Assessment done with attention to primary illness and / or condition.  Respiratory and cardiac status shows no issues at present    CHF Protocol:   Daily weights, BP checks, monitor edema in lower legs and SOB.   Limit sodium intake, avoid foods high in sodium.    Monitor fluid intake as per PCP directions.   Notify PCP office for wt gain 3 lbs in 2 days or 5 lbs In 1 week    Anti-Coagulation Medication Teaching   ACM teaching Patient the importance of taking anti-coagulations such as Plavix and Eliquis. Interactions with ASA as well as NSAIDS. Advised to report any bruising issues to PCP or Cardiology and be aware of activity that may cause injury resulting in bleeding. Be aware of any bleeding with urination or bowel movement. Take medication only as prescribed.    Offered patient enrollment in the Remote Patient Monitoring (RPM) program for in-home monitoring: Yes, patient enrolled today:     Remote Patient Monitoring Enrollment Note    Date/Time:  2024 11:30 AM    Offered patient enrollment in the [facility] Remote Patient Monitoring (RPM) program for in home monitoring for CHF;

## 2024-11-14 ENCOUNTER — CARE COORDINATION (OUTPATIENT)
Facility: CLINIC | Age: 84
End: 2024-11-14

## 2024-11-14 NOTE — CARE COORDINATION
Kit Order   Remote Patient Kit Ordering Note      Date/Time:  11/14/2024 12:42 PM      CCSS placed phone call to patient/family today to notify of RPM kit order; patient/family was unavailable; Left HIPAA compliant voicemail regarding RPM kit.    [] [facility]  [x] Patient will receive package over the next 1-3 business days. Someone 21 years or older must be present to sign for UPS delivery.  [] HRS will contact patient within 24 hours, an HRS  will call the patient directly: If the patient does not answer, HRS will follow up with the clinical team notifying them about the unsuccessful attempt to contact the patient. HRS will make three call attempts to the patient.Provide patient with [facility] Virtual install number is: [phone].  [] The RPM Nurse will contact patient once equipment is active to welcome them to the program.                                                         [] Hours of RPM monitoring - Monday-Friday [phone]; encourage patient to get vitals entered by Noon each day to have the alert addressed same day.  [x][facility] mailed RPM Patient flyer to patient.                      ACM made aware the RPM kit has been ordered.

## 2024-11-18 ENCOUNTER — CARE COORDINATION (OUTPATIENT)
Dept: CARE COORDINATION | Age: 84
End: 2024-11-18

## 2024-11-18 NOTE — CARE COORDINATION
Repeat BP is now WNL at 119/58, will ask RPM NP to DC glucose metric as pt does not check BG at home

## 2024-11-18 NOTE — CARE COORDINATION
Remote Patient Monitoring Note      Date/Time:  2024 2:19 PM  Patient Current Location: [place] contacted caregiver by telephone regarding red alert received for blood pressure reading (136/119). Verified patients name and  as identifiers.  Background: Pt enrolled for HTN, CHF and DM  Clinical Interventions:  Spoke with pt spouse and caregiver, Elo, she reports the patient is doing well. She reports he just took his medication and had not taken them prior to checking vitals. She states he is at his baseline. She will recheck BP in approx 1 hour. Welcome call completed.  Will notify ACM pt does not check blood glucose at home to have BG metric removed   Plan/Follow Up: Will continue to review, monitor and address alerts with follow up based on severity of symptoms and risk factors.            Remote Patient Monitoring Welcome Note  Date/Time:  2024 2:21 PM  Patient Current Location: Home: [address] [place] [id number]  Verified patients name and  as identifiers.       Completed and confirmed the following:    [x] Patient received all RPM equipment (tablet, scale, blood pressure device and cuff, and pulse oximeter)  Cuff Size: regular (9.05\"-15.74\")    Weight Scale:  regular (<330lbs)                    [x] Instructed patient keep box for use when returning equipment                                                          [x] Reviewed Patient Welcome Letter with patient    [x]  Reviewed Consent Form  Copy of consent form in chart.                 [x] Reviewed expectations for patient and care team  Monitoring hours M-F 9-4pm  It is important to take your vitals every day, even on the weekends,to keep your care team aware of how you are doing every day of the week.  Completing monitoring by 12pm on  so that alerts can be responded to in the same day  Patient weighs self at same time every day (or after urinating and waking up)  Take blood pressure 1-2 hrs after medications

## 2024-11-18 NOTE — PROGRESS NOTES
Remote Patient Monitoring Change to Monitoring Parameters    Patient currently being managed with remote patient monitoring for CHF, Diabetes, and HTN.    Request to discontinue monitoring parameters for glucose  in order to accommodate patient's baseline measurements, or associated changes in patient's status . Patient does not have a glucometer.    See care coordination encounters for additional details.

## 2024-11-18 NOTE — CARE COORDINATION
Date/Time:  11/18/2024 1:59 PM  LPN attempted to reach patient by telephone regarding red alert in remote patient monitoring program. Left HIPAA compliant message requesting a return call. Will attempt to reach patient again.

## 2024-11-20 ENCOUNTER — CARE COORDINATION (OUTPATIENT)
Dept: CARE COORDINATION | Age: 84
End: 2024-11-20

## 2024-11-20 NOTE — CARE COORDINATION
Date/Time:  11/20/2024 11:57 AM  LPN attempted to reach patient by telephone regarding yellow alert in remote patient monitoring program. Left HIPAA compliant message requesting a return call. Will attempt to reach patient again.

## 2024-11-20 NOTE — CARE COORDINATION
Patient wife returned call    -Remote Alert Monitoring Note      Date/Time:  2024 12:13 PM  Patient Current Location: [place]  Verified patients name and  as identifiers.    Rpm alert to be reviewed by the provider   yellow alert  survey response (Pt wife reports \"yellow zone\" for CHF )                 LPN contacted patient wife by telephone regarding red alert received   Background: Pt enrolled for HTN and CHF  Refer to 911 immediately if:  Patient unresponsive or unable to provide history  Change in cognition or sudden confusion  Patient unable to respond in complete sentences  Intense chest pain/tightness  Any concern for any clinical emergency  Red Alert: Provider response time of 1 hr required for any red alert requiring intervention  Yellow Alert: Provider response time of 3hr required for any escalated yellow alert      Clinical Interventions:  Spoke with pt wife. She reports the pt is doing fair, she does notice leg edema in both legs. However, upon looking back at his medications, he missed 2 days of metolzzone doses. She reports he is not SOB, he is using his inhalers and also nebulizer machine. She reports she is very aware of red flag s/s and will take him to the ER if she has any question at all. She is making pt take his medications in her presence after she found out he missed 2 days of diuretics. Scale was initially on a carpet floor, she has moved it to a hard wood floor/flat surface but the scale is reading in KG. Weight today is 123.2kg-271 lbs, pt wife reports his baseline weight is \"in the 270s\". Pulse ox WNL. She reports issues with his cardiomems device not transferring readings, she has been in contact with the company for approx 1 year without much success. She reports the company told her they will not come out to troubleshoot unless she tries to \"use the old one\", which she reports she has attempted several times. Will notify [facility] to see if he can help with this situation. Will

## 2024-11-21 ENCOUNTER — CARE COORDINATION (OUTPATIENT)
Facility: CLINIC | Age: 84
End: 2024-11-21

## 2024-11-21 NOTE — CARE COORDINATION
Ambulatory Care Coordination Note     11/21/2024 11:08 AM     Patient outreach attempt by this ACM today to perform care management follow up . ACM was unable to reach the patient by telephone today;   voicemail full and unable to leave a message.      ACM: Asif Hernandez RN     Care Summary Note: NA    PCP/Specialist follow up:   Future Appointments         Provider Specialty Dept Phone    12/4/2024 8:45 AM Felton Oneill MD Family Medicine [phone]    12/16/2024 12:45 PM Barbara Posey MD Cardiology [phone]    9/29/2025 10:30 AM Felton Oneill MD Family Medicine [phone]            Follow Up:   Plan for next ACM outreach in approximately 2 weeks to complete:  - medication review  - advance care planning  - goal progression  - education   - RPM.

## 2024-11-25 ENCOUNTER — CARE COORDINATION (OUTPATIENT)
Dept: CARE COORDINATION | Age: 84
End: 2024-11-25

## 2024-11-25 VITALS
SYSTOLIC BLOOD PRESSURE: 99 MMHG | WEIGHT: 202.2 LBS | DIASTOLIC BLOOD PRESSURE: 47 MMHG | HEART RATE: 93 BPM | BODY MASS INDEX: 29.86 KG/M2 | OXYGEN SATURATION: 94 %

## 2024-11-25 NOTE — CARE COORDINATION
Date/Time:  11/25/2024 1:56 PM  LPN attempted to reach caregiver by telephone regarding yellow alert (no metrics x3 days) in remote patient monitoring program.VM box is currently full, unable to leave VM

## 2024-11-26 ENCOUNTER — CARE COORDINATION (OUTPATIENT)
Dept: CARE COORDINATION | Age: 84
End: 2024-11-26

## 2024-11-26 NOTE — CARE COORDINATION
Jose Corado Jr  is currently enrolled in Remote Patient Monitoring (RPM) and has not entered vitals in 4 days. The RPM team has  Been Unable to Reach your patient to discuss adherence in RPM.    Please attempt to outreach your patient and discuss adherence with RPM. If patient is no longer interested in participating please send a dis-enrollment request to the RPM pool for processing.

## 2024-11-29 ENCOUNTER — CARE COORDINATION (OUTPATIENT)
Facility: CLINIC | Age: 84
End: 2024-11-29

## 2024-11-29 ASSESSMENT — PATIENT HEALTH QUESTIONNAIRE - PHQ9
2. FEELING DOWN, DEPRESSED OR HOPELESS: SEVERAL DAYS
SUM OF ALL RESPONSES TO PHQ QUESTIONS 1-9: 2
1. LITTLE INTEREST OR PLEASURE IN DOING THINGS: SEVERAL DAYS
SUM OF ALL RESPONSES TO PHQ QUESTIONS 1-9: 2
SUM OF ALL RESPONSES TO PHQ9 QUESTIONS 1 & 2: 2

## 2024-11-29 NOTE — CARE COORDINATION
Ambulatory Care Coordination Note     2024 2:40 PM     Patient Current Location:  Home:  [facility] [address]     ACM contacted the patient by telephone. Verified name and  with patient as identifiers.         ACM: Asif Hernandez RN     Challenges to be reviewed by the provider   Additional needs identified to be addressed with provider No  none               Method of communication with provider: phone.    Utilization: Patient has not had any utilization since our last call.     Care Summary Note:  Patient was driving at time of encounter. Although patient stated there are no changes in his medication therapy, full Med rec was not possible at this time.  Otherwise no notable change in Patient health status from last encounter. No ER visit or IP admission since last encounter. Follow up appointments listed below and questions from Patient / Care Giver answered.  Patient has ACM contact information.    Medication reconciliation done at this encounter with patient. Shows understanding of medication therapy  Assessment done with attention to primary illness and / or condition.  Respiratory and cardiac status shows no issues at present    Offered patient enrollment in the Remote Patient Monitoring (RPM) program for in-home monitoring: patient enrolled and activated - not monitoring.  Spoke with patient this afternoon. Patient stated he has not used the RPM equipment because he is moving. He has finished his move as of today and advised patient to contact RPM support to have tablet activated and begin taking RPM metrics as soon as possible. Patient stated he did want to use RPM on a regular basis and ACM will follow to verify metrics are placed in tablet.     Assessments Completed:   No changes since last call    Medications Reviewed:   Patient denies any changes with medications and reports taking all medications as prescribed.    Advance Care Planning:   Not on file; education provided

## 2024-12-02 ENCOUNTER — CARE COORDINATION (OUTPATIENT)
Facility: CLINIC | Age: 84
End: 2024-12-02

## 2024-12-04 ENCOUNTER — OFFICE VISIT (OUTPATIENT)
Facility: CLINIC | Age: 84
End: 2024-12-04

## 2024-12-04 VITALS
WEIGHT: 228 LBS | BODY MASS INDEX: 33.77 KG/M2 | HEIGHT: 69 IN | RESPIRATION RATE: 20 BRPM | HEART RATE: 67 BPM | SYSTOLIC BLOOD PRESSURE: 126 MMHG | TEMPERATURE: 98.1 F | DIASTOLIC BLOOD PRESSURE: 67 MMHG | OXYGEN SATURATION: 99 %

## 2024-12-04 DIAGNOSIS — E87.6 HYPOKALEMIA: ICD-10-CM

## 2024-12-04 DIAGNOSIS — E55.9 HYPOVITAMINOSIS D: ICD-10-CM

## 2024-12-04 DIAGNOSIS — M17.12 PRIMARY OSTEOARTHRITIS OF LEFT KNEE: ICD-10-CM

## 2024-12-04 DIAGNOSIS — I50.32 CHRONIC DIASTOLIC (CONGESTIVE) HEART FAILURE (HCC): Primary | ICD-10-CM

## 2024-12-04 DIAGNOSIS — G62.9 NEUROPATHY: ICD-10-CM

## 2024-12-04 DIAGNOSIS — F39 MOOD DISORDER (HCC): ICD-10-CM

## 2024-12-04 DIAGNOSIS — G47.00 INSOMNIA, UNSPECIFIED TYPE: ICD-10-CM

## 2024-12-04 DIAGNOSIS — I10 ESSENTIAL (PRIMARY) HYPERTENSION: ICD-10-CM

## 2024-12-04 DIAGNOSIS — J44.9 CHRONIC OBSTRUCTIVE PULMONARY DISEASE, UNSPECIFIED COPD TYPE (HCC): ICD-10-CM

## 2024-12-04 DIAGNOSIS — E78.5 HYPERLIPIDEMIA, UNSPECIFIED HYPERLIPIDEMIA TYPE: ICD-10-CM

## 2024-12-04 DIAGNOSIS — M17.11 PRIMARY OSTEOARTHRITIS OF RIGHT KNEE: ICD-10-CM

## 2024-12-04 DIAGNOSIS — E83.42 HYPOMAGNESEMIA: ICD-10-CM

## 2024-12-04 DIAGNOSIS — I25.708 ATHEROSCLEROSIS OF CORONARY ARTERY BYPASS GRAFT(S), UNSPECIFIED, WITH OTHER FORMS OF ANGINA PECTORIS (HCC): ICD-10-CM

## 2024-12-04 RX ORDER — FUROSEMIDE 40 MG/1
40 TABLET ORAL DAILY
Qty: 90 TABLET | Refills: 1 | Status: SHIPPED | OUTPATIENT
Start: 2024-12-04

## 2024-12-04 RX ORDER — METOLAZONE 5 MG/1
5 TABLET ORAL DAILY
Qty: 30 TABLET | Refills: 5 | Status: SHIPPED | OUTPATIENT
Start: 2024-12-04

## 2024-12-04 RX ORDER — GABAPENTIN 300 MG/1
300 CAPSULE ORAL 3 TIMES DAILY
Qty: 180 CAPSULE | Refills: 0 | Status: SHIPPED | OUTPATIENT
Start: 2024-12-04 | End: 2025-02-02

## 2024-12-04 RX ORDER — ALBUTEROL SULFATE 0.83 MG/ML
2.5 SOLUTION RESPIRATORY (INHALATION) EVERY 4 HOURS PRN
Qty: 120 EACH | Refills: 5 | Status: SHIPPED | OUTPATIENT
Start: 2024-12-04

## 2024-12-04 RX ORDER — ALBUTEROL SULFATE 90 UG/1
1 INHALANT RESPIRATORY (INHALATION) EVERY 4 HOURS PRN
Qty: 18 G | Refills: 5 | Status: SHIPPED | OUTPATIENT
Start: 2024-12-04

## 2024-12-04 RX ORDER — AMLODIPINE BESYLATE 10 MG/1
10 TABLET ORAL DAILY
Qty: 30 TABLET | Refills: 5 | Status: SHIPPED | OUTPATIENT
Start: 2024-12-04

## 2024-12-04 RX ORDER — PRAVASTATIN SODIUM 20 MG
20 TABLET ORAL DAILY
Qty: 90 TABLET | Refills: 1 | Status: SHIPPED | OUTPATIENT
Start: 2024-12-04

## 2024-12-04 RX ORDER — FLUTICASONE FUROATE, UMECLIDINIUM BROMIDE AND VILANTEROL TRIFENATATE 200; 62.5; 25 UG/1; UG/1; UG/1
1 POWDER RESPIRATORY (INHALATION) DAILY
Qty: 3 EACH | Refills: 5 | Status: SHIPPED | OUTPATIENT
Start: 2024-12-04

## 2024-12-04 NOTE — PATIENT INSTRUCTIONS

## 2024-12-04 NOTE — PROGRESS NOTES
HPI  Jose Corado Jr comes in for follow-up care.  He is accompanied by his wife.  CHF: Patient has CHF history.  Denies chest pain, shortness of breath or diaphoresis.  He is on medication management.  Patient is on furosemide, metolazone, spironolactone, metoprolol, aspirin.  Will continue with these medications.  Continue current treatment plan.  CAD: Patient has coronary artery disease.  Denies chest pain or diaphoresis.  Patient is on pravastatin, aspirin, metoprolol, spironolactone.  Will continue with these medications.  Hypokalemia: Patient has hypokalemia history.  Will recheck labs.  Currently on spironolactone.  Hypertension: Patient has hypertension.  Blood pressure is stable.  He is on amlodipine, spironolactone, metoprolol.  He has been stable on these medications.  We will continue current treatment plan.  Dyslipidemia: Patient has dyslipidemia.  He will continue to take a diet low in polysaturated fats.  He takes pravastatin 20 mg daily.  Continue current treatment plan.  Neuropathy: Patient has neuropathy with numbness and tingling lower extremities.  He is on gabapentin.  Would like a refill of medication.  Prescriptions will be sent in.  Arthralgia: Patient has arthralgia with joint aches and pains.  This mainly affects both knees.  Patient has been doing supportive care.  He takes Tylenol for pain as needed.  Will continue with the current treatment plan.  Hypomagnesemia: Patient has hypomagnesemia.  Will check magnesium levels.  Mood disorder: Patient has mood disorder with anxiety and depression.  He is on mirtazapine and sertraline.  Stable on the medication.  Will continue current treatment plan.  COPD: Patient has a history of COPD.  He has seen the pulmonologist in the past.  He is on Trelegy inhaler.  He also uses albuterol inhaler.  Continue current treatment plan.  Hypovitaminosis D: Patient is on vitamin D replacement therapy.  Will recheck labs at next visit.      Past Medical

## 2024-12-09 ENCOUNTER — CARE COORDINATION (OUTPATIENT)
Dept: CASE MANAGEMENT | Age: 84
End: 2024-12-09

## 2024-12-09 NOTE — CARE COORDINATION
Rpm Reviewed. No metrics entered for 17 days.  Rpm Paused  . Message to AC  Jose Corado Jr  is enrolled in Remote Patient Monitoring (RPM) and has not entered vitals in 15 days. The RPM team has  Been Unable to Reach your patient to discuss adherence in RPM. Your patient will be PAUSED in HRS due to Non-Adherence.     Please reach out to your patient and discuss adherence with RPM. If the patient is no longer interested in participating, please send a dis-enrollment request to the RPM pool for processing.     Thank You,

## 2024-12-10 ENCOUNTER — CARE COORDINATION (OUTPATIENT)
Facility: CLINIC | Age: 84
End: 2024-12-10

## 2024-12-10 DIAGNOSIS — I10 ESSENTIAL HYPERTENSION: ICD-10-CM

## 2024-12-10 DIAGNOSIS — E11.65 TYPE 2 DIABETES MELLITUS WITH HYPERGLYCEMIA, WITHOUT LONG-TERM CURRENT USE OF INSULIN (HCC): ICD-10-CM

## 2024-12-10 DIAGNOSIS — I50.32 CHF (CONGESTIVE HEART FAILURE), NYHA CLASS III, CHRONIC, DIASTOLIC (HCC): Primary | ICD-10-CM

## 2024-12-10 NOTE — CARE COORDINATION
Patient Monitoring (RPM) program for in-home monitoring: Patient being discharged from remote patient monitoring program today.     Assessments Completed:   No changes since last call    Medications Reviewed:   Patient denies any changes with medications and reports taking all medications as prescribed.    Advance Care Planning:   Not on file; education provided     Care Planning:    Goals Addressed                   This Visit's Progress     COMPLETED: Attend follow up appointments on schedule        COMPLETED: Prepare patients and caregivers for end of life decisions (ie. need for hospice, pain management, symptom relief, advance directives etc.)        COMPLETED: Take all medications as ordered                PCP/Specialist follow up:   Future Appointments         Provider Specialty Dept Phone    12/16/2024 12:45 PM Barbara Posey MD Cardiology [phone]    2/4/2025 8:30 AM Felton Oneill MD Family Medicine [phone]    9/29/2025 10:30 AM Felton Oneill MD Family Medicine [phone]            Follow Up:   No further Ambulatory Care Management follow-up scheduled at this time.  Caregiver has Ambulatory Care Manager's contact information for any further questions, concerns or needs.

## 2024-12-11 ENCOUNTER — CARE COORDINATION (OUTPATIENT)
Facility: CLINIC | Age: 84
End: 2024-12-11

## 2024-12-11 NOTE — CARE COORDINATION
Kit Return No Answer Patient Jose Corado Jr  12/11/24     Care Coordination  placed call to patient to arrange RPM kit  through [employer]. No Answer     provided return and how to pack equipment in original packing via the patients voicemail if available and provided call back number should patient have questions.    Patient made aware [employer] will  equipment in 2-4 days.

## 2024-12-16 ENCOUNTER — OFFICE VISIT (OUTPATIENT)
Age: 84
End: 2024-12-16
Payer: MEDICARE

## 2024-12-16 VITALS
SYSTOLIC BLOOD PRESSURE: 116 MMHG | HEIGHT: 69 IN | DIASTOLIC BLOOD PRESSURE: 63 MMHG | BODY MASS INDEX: 33.47 KG/M2 | WEIGHT: 226 LBS | HEART RATE: 90 BPM | OXYGEN SATURATION: 96 %

## 2024-12-16 DIAGNOSIS — I25.2 OLD MYOCARDIAL INFARCTION: ICD-10-CM

## 2024-12-16 DIAGNOSIS — J44.9 CHRONIC OBSTRUCTIVE PULMONARY DISEASE, UNSPECIFIED COPD TYPE (HCC): ICD-10-CM

## 2024-12-16 DIAGNOSIS — I50.42 CHRONIC COMBINED SYSTOLIC AND DIASTOLIC CONGESTIVE HEART FAILURE (HCC): ICD-10-CM

## 2024-12-16 DIAGNOSIS — I95.1 ORTHOSTATIC HYPOTENSION: ICD-10-CM

## 2024-12-16 DIAGNOSIS — I48.11 LONGSTANDING PERSISTENT ATRIAL FIBRILLATION (HCC): ICD-10-CM

## 2024-12-16 DIAGNOSIS — G47.33 OBSTRUCTIVE SLEEP APNEA SYNDROME: ICD-10-CM

## 2024-12-16 DIAGNOSIS — Z95.1 S/P CABG X 4: ICD-10-CM

## 2024-12-16 DIAGNOSIS — E78.5 DYSLIPIDEMIA: ICD-10-CM

## 2024-12-16 DIAGNOSIS — E11.65 TYPE 2 DIABETES MELLITUS WITH HYPERGLYCEMIA, WITHOUT LONG-TERM CURRENT USE OF INSULIN (HCC): ICD-10-CM

## 2024-12-16 DIAGNOSIS — I10 ESSENTIAL HYPERTENSION: ICD-10-CM

## 2024-12-16 DIAGNOSIS — I25.708 CORONARY ARTERY DISEASE INVOLVING CORONARY BYPASS GRAFT OF NATIVE HEART WITH OTHER FORMS OF ANGINA PECTORIS (HCC): Primary | ICD-10-CM

## 2024-12-16 PROBLEM — I50.9 ACUTE CHF (HCC): Status: RESOLVED | Noted: 2020-03-21 | Resolved: 2024-12-16

## 2024-12-16 PROBLEM — I31.9 DISEASE OF PERICARDIUM: Status: RESOLVED | Noted: 2024-01-04 | Resolved: 2024-12-16

## 2024-12-16 PROBLEM — I50.9 CONGESTIVE HEART FAILURE (HCC): Status: RESOLVED | Noted: 2020-03-21 | Resolved: 2024-12-16

## 2024-12-16 PROBLEM — I50.32 CHRONIC DIASTOLIC CONGESTIVE HEART FAILURE (HCC): Status: RESOLVED | Noted: 2018-10-04 | Resolved: 2024-12-16

## 2024-12-16 PROBLEM — I50.32 CHF (CONGESTIVE HEART FAILURE), NYHA CLASS III, CHRONIC, DIASTOLIC (HCC): Status: RESOLVED | Noted: 2020-01-29 | Resolved: 2024-12-16

## 2024-12-16 PROBLEM — I48.92 ATRIAL FLUTTER (HCC): Status: RESOLVED | Noted: 2018-10-04 | Resolved: 2024-12-16

## 2024-12-16 PROCEDURE — 3023F SPIROM DOC REV: CPT | Performed by: INTERNAL MEDICINE

## 2024-12-16 PROCEDURE — 3078F DIAST BP <80 MM HG: CPT | Performed by: INTERNAL MEDICINE

## 2024-12-16 PROCEDURE — 99214 OFFICE O/P EST MOD 30 MIN: CPT | Performed by: INTERNAL MEDICINE

## 2024-12-16 PROCEDURE — 1159F MED LIST DOCD IN RCRD: CPT | Performed by: INTERNAL MEDICINE

## 2024-12-16 PROCEDURE — 1123F ACP DISCUSS/DSCN MKR DOCD: CPT | Performed by: INTERNAL MEDICINE

## 2024-12-16 PROCEDURE — 1125F AMNT PAIN NOTED PAIN PRSNT: CPT | Performed by: INTERNAL MEDICINE

## 2024-12-16 PROCEDURE — G8482 FLU IMMUNIZE ORDER/ADMIN: HCPCS | Performed by: INTERNAL MEDICINE

## 2024-12-16 PROCEDURE — 3074F SYST BP LT 130 MM HG: CPT | Performed by: INTERNAL MEDICINE

## 2024-12-16 PROCEDURE — 1036F TOBACCO NON-USER: CPT | Performed by: INTERNAL MEDICINE

## 2024-12-16 PROCEDURE — 1160F RVW MEDS BY RX/DR IN RCRD: CPT | Performed by: INTERNAL MEDICINE

## 2024-12-16 PROCEDURE — G8417 CALC BMI ABV UP PARAM F/U: HCPCS | Performed by: INTERNAL MEDICINE

## 2024-12-16 PROCEDURE — G8427 DOCREV CUR MEDS BY ELIG CLIN: HCPCS | Performed by: INTERNAL MEDICINE

## 2024-12-16 PROCEDURE — 3044F HG A1C LEVEL LT 7.0%: CPT | Performed by: INTERNAL MEDICINE

## 2024-12-16 RX ORDER — DAPAGLIFLOZIN 10 MG/1
10 TABLET, FILM COATED ORAL EVERY MORNING
Qty: 90 TABLET | Refills: 3 | Status: SHIPPED | OUTPATIENT
Start: 2024-12-16

## 2024-12-16 RX ORDER — ROSUVASTATIN CALCIUM 20 MG/1
20 TABLET, COATED ORAL NIGHTLY
Qty: 30 TABLET | Refills: 3 | Status: SHIPPED | OUTPATIENT
Start: 2024-12-16

## 2024-12-16 ASSESSMENT — PATIENT HEALTH QUESTIONNAIRE - PHQ9
SUM OF ALL RESPONSES TO PHQ QUESTIONS 1-9: 0
DEPRESSION UNABLE TO ASSESS: FUNCTIONAL CAPACITY MOTIVATION LIMITS ACCURACY
SUM OF ALL RESPONSES TO PHQ QUESTIONS 1-9: 0
SUM OF ALL RESPONSES TO PHQ QUESTIONS 1-9: 0
2. FEELING DOWN, DEPRESSED OR HOPELESS: NOT AT ALL
SUM OF ALL RESPONSES TO PHQ QUESTIONS 1-9: 0
1. LITTLE INTEREST OR PLEASURE IN DOING THINGS: NOT AT ALL
SUM OF ALL RESPONSES TO PHQ9 QUESTIONS 1 & 2: 0

## 2024-12-16 ASSESSMENT — ENCOUNTER SYMPTOMS
SHORTNESS OF BREATH: 1
GASTROINTESTINAL NEGATIVE: 1
EYES NEGATIVE: 1

## 2024-12-16 NOTE — PROGRESS NOTES
Jose Corado Jr is a 84 y.o. year old male.    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 for gout and acute on chronic systolic CHF.  According to wife Lynette pillow is nonoperational.  She is adjusting metolazone as needed for edema and is scheduled to have labs drawn as ordered by PCP today.  12/16/2024 AVILA NYHA3, edema chronic but reduces with metolazone which wife gives 3 times a week when she sees the swelling getting worse.  No chest pain or dizziness.          Review of Systems   Constitutional: Negative.    HENT: Negative.     Eyes: Negative.    Respiratory:  Positive for shortness of breath.    Cardiovascular:  Positive for leg swelling.   Gastrointestinal: Negative.    Endocrine: Negative.    Genitourinary: Negative.    Musculoskeletal: Negative.    Neurological: Negative.    Psychiatric/Behavioral: Negative.     All other systems reviewed and are negative.        Physical Exam  Vitals and nursing note reviewed.   Constitutional:       Appearance: Normal appearance. He is obese.   HENT:      Head: Normocephalic and atraumatic.      Nose: Nose normal.   Eyes:      Conjunctiva/sclera: Conjunctivae normal.   Cardiovascular:      Rate and Rhythm: Normal rate and regular rhythm.      Pulses: Normal pulses.      Heart sounds: Normal heart sounds.   Pulmonary:      Effort: Pulmonary effort is normal.      Breath sounds: Normal breath sounds.   Abdominal:      General: Bowel sounds are normal.      Palpations: Abdomen is soft.   Musculoskeletal:         General: Normal range of motion.      Right lower leg: No edema.      Left lower leg: No edema.   Skin:     General: Skin is warm and dry.   Neurological:      General: No focal deficit present.      Mental Status: He is alert and oriented to person, place, and time.   Psychiatric:         Mood and Affect: Mood normal.

## 2024-12-16 NOTE — PROGRESS NOTES
1. Have you been to the ER, urgent care clinic since your last visit?  Hospitalized since your last visit?     No      2.  Where do you normally have your labs drawn?   [facility]    3. Do you need any refills today?   No    4. Which local pharmacy do you use (enter pharmacy)?   [facility]    5. Which mail order pharmacy do you use (enter pharmacy)?   No     6. Are you here for surgical clearance and if so who will be doing your     procedure/surgery (care team)?   No

## 2024-12-17 ENCOUNTER — TELEPHONE (OUTPATIENT)
Age: 84
End: 2024-12-17

## 2024-12-17 NOTE — TELEPHONE ENCOUNTER
Spoke to Alivia the representative regarding  [employer] pillow. She states patient has to call the [phone] and request for new pillow. Spoke with wife and she voices understanding and acceptance of this advice and will call back if any further questions or concerns.

## 2024-12-17 NOTE — TELEPHONE ENCOUNTER
----- Message from Caitlyn MONK sent at 12/16/2024  9:40 AM EST -----  Check with [employer] representative if his pillow can be replaced as it does not operate well.    Please call Alivia Dueñas [phone].  She is the [employer] representative your

## 2025-02-04 ENCOUNTER — OFFICE VISIT (OUTPATIENT)
Facility: CLINIC | Age: 85
End: 2025-02-04

## 2025-02-04 VITALS
OXYGEN SATURATION: 93 % | HEIGHT: 69 IN | BODY MASS INDEX: 33.92 KG/M2 | RESPIRATION RATE: 20 BRPM | HEART RATE: 61 BPM | WEIGHT: 229 LBS | SYSTOLIC BLOOD PRESSURE: 136 MMHG | DIASTOLIC BLOOD PRESSURE: 83 MMHG | TEMPERATURE: 97.8 F

## 2025-02-04 DIAGNOSIS — G47.00 INSOMNIA, UNSPECIFIED TYPE: ICD-10-CM

## 2025-02-04 DIAGNOSIS — F39 MOOD DISORDER (HCC): ICD-10-CM

## 2025-02-04 DIAGNOSIS — K21.9 GASTRO-ESOPHAGEAL REFLUX DISEASE WITHOUT ESOPHAGITIS: ICD-10-CM

## 2025-02-04 DIAGNOSIS — J44.9 CHRONIC OBSTRUCTIVE PULMONARY DISEASE, UNSPECIFIED COPD TYPE (HCC): ICD-10-CM

## 2025-02-04 DIAGNOSIS — N18.30 STAGE 3 CHRONIC KIDNEY DISEASE, UNSPECIFIED WHETHER STAGE 3A OR 3B CKD (HCC): ICD-10-CM

## 2025-02-04 DIAGNOSIS — I48.11 LONGSTANDING PERSISTENT ATRIAL FIBRILLATION (HCC): ICD-10-CM

## 2025-02-04 DIAGNOSIS — M17.12 PRIMARY OSTEOARTHRITIS OF LEFT KNEE: ICD-10-CM

## 2025-02-04 DIAGNOSIS — M17.11 PRIMARY OSTEOARTHRITIS OF RIGHT KNEE: ICD-10-CM

## 2025-02-04 DIAGNOSIS — E11.21 TYPE 2 DIABETES WITH NEPHROPATHY (HCC): ICD-10-CM

## 2025-02-04 DIAGNOSIS — I48.91 ATRIAL FIBRILLATION, UNSPECIFIED TYPE (HCC): ICD-10-CM

## 2025-02-04 DIAGNOSIS — E78.5 HYPERLIPIDEMIA, UNSPECIFIED HYPERLIPIDEMIA TYPE: ICD-10-CM

## 2025-02-04 DIAGNOSIS — I25.708 ATHEROSCLEROSIS OF CORONARY ARTERY BYPASS GRAFT(S), UNSPECIFIED, WITH OTHER FORMS OF ANGINA PECTORIS (HCC): ICD-10-CM

## 2025-02-04 DIAGNOSIS — E87.6 HYPOKALEMIA: Primary | ICD-10-CM

## 2025-02-04 DIAGNOSIS — I50.33 ACUTE ON CHRONIC DIASTOLIC HEART FAILURE (HCC): ICD-10-CM

## 2025-02-04 DIAGNOSIS — G62.9 NEUROPATHY: ICD-10-CM

## 2025-02-04 DIAGNOSIS — I10 ESSENTIAL (PRIMARY) HYPERTENSION: ICD-10-CM

## 2025-02-04 RX ORDER — PRAVASTATIN SODIUM 40 MG
TABLET ORAL
COMMUNITY
Start: 2024-08-11

## 2025-02-04 RX ORDER — SERTRALINE HYDROCHLORIDE 100 MG/1
150 TABLET, FILM COATED ORAL DAILY
Qty: 90 TABLET | Refills: 1 | Status: SHIPPED | OUTPATIENT
Start: 2025-02-04

## 2025-02-04 RX ORDER — ALBUTEROL SULFATE 90 UG/1
1 INHALANT RESPIRATORY (INHALATION) EVERY 4 HOURS PRN
Qty: 18 G | Refills: 5 | Status: SHIPPED | OUTPATIENT
Start: 2025-02-04

## 2025-02-04 RX ORDER — PANTOPRAZOLE SODIUM 40 MG/1
40 TABLET, DELAYED RELEASE ORAL DAILY
Qty: 90 TABLET | Refills: 1 | Status: SHIPPED | OUTPATIENT
Start: 2025-02-04

## 2025-02-04 RX ORDER — MAGNESIUM 200 MG
200 TABLET ORAL DAILY
Qty: 90 TABLET | Refills: 1 | Status: SHIPPED | OUTPATIENT
Start: 2025-02-04

## 2025-02-04 RX ORDER — GABAPENTIN 300 MG/1
300 CAPSULE ORAL 3 TIMES DAILY
Qty: 180 CAPSULE | Refills: 0 | Status: SHIPPED | OUTPATIENT
Start: 2025-02-04 | End: 2025-04-05

## 2025-02-04 SDOH — ECONOMIC STABILITY: FOOD INSECURITY: WITHIN THE PAST 12 MONTHS, YOU WORRIED THAT YOUR FOOD WOULD RUN OUT BEFORE YOU GOT MONEY TO BUY MORE.: NEVER TRUE

## 2025-02-04 SDOH — ECONOMIC STABILITY: FOOD INSECURITY: WITHIN THE PAST 12 MONTHS, THE FOOD YOU BOUGHT JUST DIDN'T LAST AND YOU DIDN'T HAVE MONEY TO GET MORE.: NEVER TRUE

## 2025-02-04 ASSESSMENT — PATIENT HEALTH QUESTIONNAIRE - PHQ9
SUM OF ALL RESPONSES TO PHQ QUESTIONS 1-9: 0
1. LITTLE INTEREST OR PLEASURE IN DOING THINGS: NOT AT ALL
SUM OF ALL RESPONSES TO PHQ QUESTIONS 1-9: 0
SUM OF ALL RESPONSES TO PHQ9 QUESTIONS 1 & 2: 0
2. FEELING DOWN, DEPRESSED OR HOPELESS: NOT AT ALL
SUM OF ALL RESPONSES TO PHQ QUESTIONS 1-9: 0
SUM OF ALL RESPONSES TO PHQ QUESTIONS 1-9: 0

## 2025-02-04 NOTE — PROGRESS NOTES
\"Have you been to the ER, urgent care clinic since your last visit?  Hospitalized since your last visit?\"    YES - When: approximately 3  weeks ago.  Where and Why: [facility] abnormal lab results.    “Have you seen or consulted any other health care providers outside our system since your last visit?”    NO

## 2025-02-04 NOTE — PROGRESS NOTES
HPI  Jose Corado Jr comes in for follow-up care.  Hypokalemia: Patient has had episodes of hypokalemia.  He is currently taking potassium replacement therapy.  Patient was given potassium 20 mill equivalents to take daily.  I will recheck labs.  CHF: Patient has chronic diastolic CHF.  He has occasional shortness of breath and wheeze.  He has CardioMEMS implant in place.  Denies lower extremity swelling.  Patient is on spironolactone, aspirin, metoprolol, metolazone as needed, furosemide, Farxiga.  He will continue with these medications.  We will recheck labs.  Knee pain: Patient has bilateral knee pain.  Pain noted with range of motion.  This is due to osteoarthritis.  He has been doing supportive care measures.  He can take Tylenol for pain as needed.  He has applied Lidoderm patches.  Will continue current management plan.  Neuropathy: Patient has neuropathic pain bilateral lower extremities.  Gets numbness and tingling.  He has used gabapentin in the past with good result.  Would like a refill of medication as he has ran out.  Prescription will be sent in.  Mood disorder: Patient has mood disorder with anxiety and depression.  He is on mirtazapine 7.5 mg at bedtime and Zoloft 150 mg daily.  He has been stable on the medication.  He is doing supportive care measures.  Will continue current treatment plan.  Insomnia: Patient has insomnia.  He is on Remeron 7.5 mg at bedtime.  Continue current treatment plan.  COPD: Patient has COPD.  Gets wheeze and shortness of breath.  This comes on and off.  He would like referral to see a different pulmonologist.  He is on inhaler medication.  He takes Trelegy Ellipta and albuterol inhalers.  I will place a referral to see pulmonologist.  He will continue current treatment plan.  CAD: Patient has coronary artery disease.  Denies chest pain, shortness of breath or diaphoresis.  He has been seen by the cardiologist in the past.  Patient is on Farxiga, Crestor, apixaban,

## 2025-02-04 NOTE — PATIENT INSTRUCTIONS

## 2025-03-03 ENCOUNTER — CARE COORDINATION (OUTPATIENT)
Facility: CLINIC | Age: 85
End: 2025-03-03

## 2025-03-03 NOTE — CARE COORDINATION
Care Transitions Note    Initial Call - Call within 2 business days of discharge: Yes    Patient Current Location:  Home: [address] [place] [id number]    Patient: Jose Corado Jr    Patient : 1940   MRN: [id number]    Reason for Admission: Acute on chronic congestive heart failure, unspecified heart failure   Discharge Date: 24  RURS: Readmission Risk Score: 14.2      Last Discharge Facility       Date Complaint Diagnosis Description Type Department Provider    4/15/24 Hand Pain; Rash Acute gout of right wrist, unspecified cause ... ED to Hosp-Admission (Discharged) (ADMITTED) [id number] Yoo MD; Ravinder Polanco..            Was this an external facility discharge? Yes. Discharge Date: 3/1/25. Facility Name: [facility]     Additional needs identified to be addressed with provider       FYI - Patient recently admitted to [facility] on 25-3/1/25 for Acute on Chronic CHF. CTN reached Patient spouse. Pt. Spouse reported that Pt. Is doing okay. Pt. Spouse would like to let you know that they need more Personal care aide hours. CTN strongly advised Patient spouse to contact Patient  for PCA hrs. Pt. Spouse states that she will do so.            Method of communication with provider: chart routing.    Care Summary Note:     CTN attempt to reach Patient for follow up.   CTN reached Patient spouse Mrs. Kerline Corado on phone (listed on Pt. PHI). Pt. Spouse reported that Pt. Is doing okay and currently resting at this time.   Pt. Spouse reported that Pt. Needs more Personal care aide hours. CTN strongly advised for Patient spouse to contact Patient  for PCA hrs. Pt. Spouse states that she will do so.   Pt. Spouse states that Pt. Oxygen was delivered this morning.   Pt. Spouse reported that pt. Has Personal care aide 3 days a week. MTW from 2-6  Pt. Spouse is aware of Pt. PCP appt. On .   Pt. Spouse declined to review

## 2025-03-04 ENCOUNTER — CARE COORDINATION (OUTPATIENT)
Facility: CLINIC | Age: 85
End: 2025-03-04

## 2025-03-04 NOTE — CARE COORDINATION
Care Transitions Note    Initial Call - Call within 2 business days of discharge: Yes    Attempted to reach patient for transitions of care follow up. Unable to reach patient.    Outreach Attempts:   HIPAA compliant voicemail left for patient.     atient: Jose Corado Jr    Patient : 1940   MRN: [id number]    Reason for Admission: Acute on chronic congestive heart failure, unspecified heart failure   Discharge Date: 24  RURS: Readmission Risk Score: 14.2    Last Discharge Facility       Date Complaint Diagnosis Description Type Department Provider    4/15/24 Hand Pain; Rash Acute gout of right wrist, unspecified cause ... ED to Hosp-Admission (Discharged) (ADMITTED) [id number] Yoo MD; Kalen Polanco...          Future Appointments         Provider Specialty Dept Phone    3/5/2025 8:30 AM Felton Oneill MD Family Medicine [phone]    3/6/2025 8:30 AM Natalie Bee, APRN - NP Cardiology [phone]    2025 10:30 AM Felton Oneill MD Family Medicine [phone]            Plan for follow-up call in 6-10 days     Laurita Desai RN

## 2025-03-05 ENCOUNTER — OFFICE VISIT (OUTPATIENT)
Facility: CLINIC | Age: 85
End: 2025-03-05

## 2025-03-05 VITALS
BODY MASS INDEX: 33.82 KG/M2 | HEIGHT: 69 IN | OXYGEN SATURATION: 100 % | TEMPERATURE: 97.6 F | SYSTOLIC BLOOD PRESSURE: 134 MMHG | RESPIRATION RATE: 24 BRPM | DIASTOLIC BLOOD PRESSURE: 79 MMHG | HEART RATE: 67 BPM

## 2025-03-05 DIAGNOSIS — I10 ESSENTIAL (PRIMARY) HYPERTENSION: ICD-10-CM

## 2025-03-05 DIAGNOSIS — E78.5 HYPERLIPIDEMIA, UNSPECIFIED HYPERLIPIDEMIA TYPE: ICD-10-CM

## 2025-03-05 DIAGNOSIS — N18.30 STAGE 3 CHRONIC KIDNEY DISEASE, UNSPECIFIED WHETHER STAGE 3A OR 3B CKD (HCC): ICD-10-CM

## 2025-03-05 DIAGNOSIS — I50.23 ACUTE ON CHRONIC SYSTOLIC CONGESTIVE HEART FAILURE (HCC): Primary | ICD-10-CM

## 2025-03-05 DIAGNOSIS — I48.91 ATRIAL FIBRILLATION, UNSPECIFIED TYPE (HCC): ICD-10-CM

## 2025-03-05 DIAGNOSIS — G47.00 INSOMNIA, UNSPECIFIED TYPE: ICD-10-CM

## 2025-03-05 DIAGNOSIS — I25.708 ATHEROSCLEROSIS OF CORONARY ARTERY BYPASS GRAFT(S), UNSPECIFIED, WITH OTHER FORMS OF ANGINA PECTORIS: ICD-10-CM

## 2025-03-05 DIAGNOSIS — G62.9 NEUROPATHY: ICD-10-CM

## 2025-03-05 DIAGNOSIS — J10.1 INFLUENZA A: ICD-10-CM

## 2025-03-05 DIAGNOSIS — Z09 HOSPITAL DISCHARGE FOLLOW-UP: ICD-10-CM

## 2025-03-05 DIAGNOSIS — F39 MOOD DISORDER: ICD-10-CM

## 2025-03-05 DIAGNOSIS — J44.9 CHRONIC OBSTRUCTIVE PULMONARY DISEASE, UNSPECIFIED COPD TYPE (HCC): ICD-10-CM

## 2025-03-05 DIAGNOSIS — E11.21 TYPE 2 DIABETES WITH NEPHROPATHY (HCC): ICD-10-CM

## 2025-03-05 RX ORDER — MAGNESIUM 200 MG
200 TABLET ORAL DAILY
Qty: 90 TABLET | Refills: 1 | Status: SHIPPED | OUTPATIENT
Start: 2025-03-05

## 2025-03-05 RX ORDER — PREDNISONE 20 MG/1
40 TABLET ORAL DAILY
COMMUNITY
Start: 2025-03-03 | End: 2025-03-07

## 2025-03-05 RX ORDER — M-VIT,TX,IRON,MINS/CALC/FOLIC 27MG-0.4MG
1 TABLET ORAL DAILY
Qty: 90 TABLET | Refills: 1 | Status: SHIPPED | OUTPATIENT
Start: 2025-03-05

## 2025-03-05 RX ORDER — AZITHROMYCIN 500 MG/1
500 TABLET, FILM COATED ORAL EVERY 24 HOURS
COMMUNITY
Start: 2025-03-03 | End: 2025-03-07

## 2025-03-05 NOTE — PROGRESS NOTES
\"Have you been to the ER, urgent care clinic since your last visit?  Hospitalized since your last visit?\"    NO    “Have you seen or consulted any other health care providers outside our system since your last visit?”    NO           
sulfate HFA (PROVENTIL;VENTOLIN;PROAIR) 108 (90 Base) MCG/ACT inhaler Inhale 1 puff into the lungs every 4 hours as needed for Shortness of Breath or Wheezing 18 g 5    gabapentin (NEURONTIN) 300 MG capsule Take 1 capsule by mouth 3 times daily for 60 days. Max Daily Amount: 900 mg 180 capsule 0    magnesium 200 MG TABS tablet Take 1 tablet by mouth daily 90 tablet 1    dapagliflozin (FARXIGA) 10 MG tablet Take 1 tablet by mouth every morning 90 tablet 3    albuterol (PROVENTIL) (2.5 MG/3ML) 0.083% nebulizer solution Take 3 mLs by nebulization every 4 hours as needed for Shortness of Breath or Wheezing 120 each 5    vitamin D (CHOLECALCIFEROL) 25 MCG (1000 UT) TABS tablet Take 1 tablet by mouth 2 times daily 90 tablet 1    furosemide (LASIX) 40 MG tablet Take 1 tablet by mouth daily 90 tablet 1    metOLazone (ZAROXOLYN) 5 MG tablet Take 1 tablet by mouth daily As needed 30 tablet 5    amLODIPine (NORVASC) 10 MG tablet Take 1 tablet by mouth daily 30 tablet 5    apixaban (ELIQUIS) 5 MG TABS tablet Take 0.5 tablets by mouth 2 times daily 60 tablet 2    fluticasone-umeclidin-vilant (TRELEGY ELLIPTA) 200-62.5-25 MCG/ACT AEPB inhaler Inhale 1 puff into the lungs daily 3 each 5    mirtazapine (REMERON) 7.5 MG tablet Take 1 tablet by mouth nightly 90 tablet 1    lidocaine (LIDODERM) 5 % Place onto the skin      acetaminophen (TYLENOL) 500 MG tablet Take 2 tablets by mouth every 8 hours as needed for Pain 180 tablet 0    docusate (COLACE, DULCOLAX) 100 MG CAPS Take 100 mg by mouth daily as needed (constipation) 90 capsule 1    loratadine (CLARITIN) 10 MG tablet Take 1 tablet by mouth daily      Multiple Vitamins-Minerals (THERAPEUTIC MULTIVITAMIN-MINERALS) tablet Take 1 tablet by mouth daily      metoprolol succinate (TOPROL XL) 25 MG extended release tablet Take 1 tablet by mouth daily 30 tablet 0    spironolactone (ALDACTONE) 25 MG tablet Take 1 tablet by mouth daily 30 tablet 3    potassium chloride (KLOR-CON M) 20 MEQ

## 2025-03-06 ENCOUNTER — OFFICE VISIT (OUTPATIENT)
Age: 85
End: 2025-03-06
Payer: MEDICARE

## 2025-03-06 VITALS — OXYGEN SATURATION: 97 % | DIASTOLIC BLOOD PRESSURE: 64 MMHG | SYSTOLIC BLOOD PRESSURE: 100 MMHG | HEART RATE: 78 BPM

## 2025-03-06 DIAGNOSIS — Z95.1 S/P CABG X 4: ICD-10-CM

## 2025-03-06 DIAGNOSIS — G47.33 OBSTRUCTIVE SLEEP APNEA SYNDROME: ICD-10-CM

## 2025-03-06 DIAGNOSIS — E78.5 DYSLIPIDEMIA: ICD-10-CM

## 2025-03-06 DIAGNOSIS — I10 ESSENTIAL HYPERTENSION: ICD-10-CM

## 2025-03-06 DIAGNOSIS — I50.42 CHRONIC COMBINED SYSTOLIC AND DIASTOLIC CONGESTIVE HEART FAILURE (HCC): ICD-10-CM

## 2025-03-06 DIAGNOSIS — I25.708 CORONARY ARTERY DISEASE INVOLVING CORONARY BYPASS GRAFT OF NATIVE HEART WITH OTHER FORMS OF ANGINA PECTORIS: Primary | ICD-10-CM

## 2025-03-06 DIAGNOSIS — J44.9 CHRONIC OBSTRUCTIVE PULMONARY DISEASE, UNSPECIFIED COPD TYPE (HCC): ICD-10-CM

## 2025-03-06 DIAGNOSIS — I48.11 LONGSTANDING PERSISTENT ATRIAL FIBRILLATION (HCC): ICD-10-CM

## 2025-03-06 PROCEDURE — G8417 CALC BMI ABV UP PARAM F/U: HCPCS | Performed by: NURSE PRACTITIONER

## 2025-03-06 PROCEDURE — 3074F SYST BP LT 130 MM HG: CPT | Performed by: NURSE PRACTITIONER

## 2025-03-06 PROCEDURE — 3023F SPIROM DOC REV: CPT | Performed by: NURSE PRACTITIONER

## 2025-03-06 PROCEDURE — 1036F TOBACCO NON-USER: CPT | Performed by: NURSE PRACTITIONER

## 2025-03-06 PROCEDURE — 3078F DIAST BP <80 MM HG: CPT | Performed by: NURSE PRACTITIONER

## 2025-03-06 PROCEDURE — 99214 OFFICE O/P EST MOD 30 MIN: CPT | Performed by: NURSE PRACTITIONER

## 2025-03-06 PROCEDURE — 1159F MED LIST DOCD IN RCRD: CPT | Performed by: NURSE PRACTITIONER

## 2025-03-06 PROCEDURE — 1126F AMNT PAIN NOTED NONE PRSNT: CPT | Performed by: NURSE PRACTITIONER

## 2025-03-06 PROCEDURE — 1123F ACP DISCUSS/DSCN MKR DOCD: CPT | Performed by: NURSE PRACTITIONER

## 2025-03-06 PROCEDURE — G8427 DOCREV CUR MEDS BY ELIG CLIN: HCPCS | Performed by: NURSE PRACTITIONER

## 2025-03-06 RX ORDER — BUMETANIDE 1 MG/1
2 TABLET ORAL DAILY
COMMUNITY

## 2025-03-06 ASSESSMENT — ENCOUNTER SYMPTOMS
GASTROINTESTINAL NEGATIVE: 1
EYES NEGATIVE: 1
COUGH: 1
SHORTNESS OF BREATH: 1
RHINORRHEA: 1

## 2025-03-06 ASSESSMENT — PATIENT HEALTH QUESTIONNAIRE - PHQ9
SUM OF ALL RESPONSES TO PHQ QUESTIONS 1-9: 2
1. LITTLE INTEREST OR PLEASURE IN DOING THINGS: SEVERAL DAYS
2. FEELING DOWN, DEPRESSED OR HOPELESS: SEVERAL DAYS
SUM OF ALL RESPONSES TO PHQ QUESTIONS 1-9: 2

## 2025-03-06 NOTE — PROGRESS NOTES
1. Have you been to the ER, urgent care clinic since your last visit?  Hospitalized since your last visit?     Yes When: Feb 25-March 2 Where: [facility] Reason for visit: FLU    2. Have you seen or consulted any other health care providers outside of the [facility] since your last visit?  Include any pap smears or colon screening.      Yes [facility] flu

## 2025-03-06 NOTE — PROGRESS NOTES
Jose Corado Jr is a 84 y.o. year old male.    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  According to wife Lynette pillow is nonoperational.  She is adjusting metolazone as needed for edema and is scheduled to have labs drawn as ordered by PCP today.  12/16/2024 AVILA NYHA3, edema chronic but reduces with metolazone which wife gives 3 times a week when she sees the swelling getting worse.  No chest pain or dizziness.  3/6/2025  Seen following recent admission for acute on chronic CHF and influenza he continues to complain of fatigue and dyspnea on exertion.  He did not have fasting blood work drawn prior to today's visit due to acute illness.  He continues to complain of fatigue chest cough and congestion.  He is no longer febrile medications adjusted during hospitalization currently without edema denies chest pain or palpitations.  He is now on oxygen at 2 L nasal cannula          Review of Systems   Constitutional:  Positive for fatigue.   HENT:  Positive for rhinorrhea.    Eyes: Negative.    Respiratory:  Positive for cough and shortness of breath.    Cardiovascular:  Negative for leg swelling.   Gastrointestinal: Negative.    Endocrine: Negative.    Genitourinary: Negative.    Musculoskeletal: Negative.    Neurological: Negative.    Psychiatric/Behavioral: Negative.     All other systems reviewed and are negative.        Physical Exam  Vitals and nursing note reviewed.   Constitutional:       Appearance: Normal appearance. He is obese.   HENT:      Head: Normocephalic and atraumatic.      Nose: Nose normal.   Eyes:      Conjunctiva/sclera: Conjunctivae normal.   Cardiovascular:      Rate and Rhythm: Normal rate and regular rhythm.      Pulses: Normal pulses.      Heart sounds: Normal heart sounds.   Pulmonary:      Effort:

## 2025-03-11 ENCOUNTER — CARE COORDINATION (OUTPATIENT)
Facility: CLINIC | Age: 85
End: 2025-03-11

## 2025-03-11 NOTE — CARE COORDINATION
Care Transitions Note    Follow Up Call     Patient Current Location:  Home:  [facility] [id number]    Care Transition Nurse contacted the patient by telephone. Verified name and  as identifiers.    Additional needs identified to be addressed with provider   No needs identified                 Method of communication with provider: none.    Care Summary Note:     Patient reports that he is doing pretty good and feeling Better than before.    Patient states that Today is the last day for antibiotic.   Pt. States that Cardiology and PCP. Appt. Went well.   Pt. Denied CP, SOB , fever and chills at this time.   Pt. Reports that Legs swelling and feet are still present.   Pt. States that Metolazone helps with his leg swelling.   No new or worsening as per Pt.     Patient gave the phone to his wife.   CTN spoke with Mrs Kerline Corado.   Mrs. Corado informed CTN that she Spoke with  today and was able to get more hrs and extra days for Patient.   Patient spouse that Patient can walk with walker .   CTN mentioned about RPM.   Pt. Spouse states that they are interested as patient had it in the past.     Patient provided verbal permission to speak with Kerline Corado , spouse/partner to discuss patient's health and provide information during 30-day transition of care period.      CTN unable to talk to Pt. Spouse fully about RPM. Pt. Spouse kept the conversation short and concluded the call.     Future Appointments         Provider Specialty Dept Phone    2025 8:00 AM Felton Oneill MD Family Medicine [phone]    2025 8:30 AM Barbara Posey MD Cardiology [phone]    2025 10:30 AM Felton Oneill MD Family Medicine [phone]            Care Transition Nurse provided contact information.  Plan for follow-up call in 6-10 days based on severity of symptoms and risk factors.  Plan for next call:  follow up symptoms, assess for any needs.       Laurita ASHBY

## 2025-03-12 ENCOUNTER — CARE COORDINATION (OUTPATIENT)
Facility: CLINIC | Age: 85
End: 2025-03-12

## 2025-03-12 NOTE — CARE COORDINATION
CTN received a missed call from Patient.     Ctn called Patient back. Pt. Reports that she called CTN by mistakes.     Patient kept the conversation short and concluded the call.

## 2025-03-14 ENCOUNTER — CARE COORDINATION (OUTPATIENT)
Facility: CLINIC | Age: 85
End: 2025-03-14

## 2025-03-14 NOTE — CARE COORDINATION
Care Transitions Note    Follow Up Call       Additional needs identified to be addressed with provider   No needs identified                 Method of communication with provider: none.    Care Summary Note:     Patient reports that he is doing okay.   Patient placed CTN on speaker phone.   Patient and Patient spouse Mrs. Kerline Corado on PHI agree for RPM.   Pt. Spouse states that Patient does not have a copay for RPM.   CTN informed Patient and spouse that It is important to take his vitals every day, even on the weekends, to keep your care team aware of how he is  doing every day of the week. Pt. And spouse state okay.       No questions, concerns and/or needs at this time as per Pt.  And Pt. spouse    Patient reminded that there is a physician on call 24 hours a day / 7 days a week (M-F 5pm to 8am and from Friday 5pm until Monday 8a for the weekend) should the patient have questions or concerns.  Patient reminded to call 911 if situation is emergent ( such as chest pain, shortness of breath, unstoppable bleeding, feeling of passing out,  worsening of symptoms)or patient feels the situation is emergent.  Pt verbalizes understanding.    Pt. Spouse kept the conversation short and ended the call.       Remote Patient Monitoring:  Offered patient enrollment in the Remote Patient Monitoring (RPM) program for in-home monitoring: Yes, patient enrolled today:     Remote Patient Monitoring Enrollment Note    Date/Time:  3/14/2025 4:20 PM    Offered patient enrollment in the [facility] Remote Patient Monitoring (RPM) program for in home monitoring for CHF; condition managed by [facility]. [facility].  Patient accepted.    Patient will be monitoring the following daily:  Blood Pressure, Pulse ox, and Weight    CTN reviewed the information below with the patient:    Emergency Contact (name and contact number):     Tommie Corado [phone]    No copay for RPM as per spouse    [x]  A

## 2025-03-17 DIAGNOSIS — I50.21 ACUTE SYSTOLIC CONGESTIVE HEART FAILURE (HCC): Primary | ICD-10-CM

## 2025-03-17 NOTE — PROGRESS NOTES
Remote Patient Monitoring Treatment Plan    Received request from ACM/CTLaurita Lafleur RN   to order remote patient monitoring for in home monitoring of CHF; condition managed by Dr. Posey ,[facility] and order completed.     Patient will be monitoring blood pressure   pulse ox   weight.      Patient will engage in Remote Patient Monitoring each day to develop the skills necessary for self management.       RPM Care Team Responsibilities:   Alerts will be reviewed daily and addressed within 2-4 hours during operational hours (Monday -Friday 9 am-4 pm)  Alert response and intervention documented in patient medical record  Alert response escalated to PCP per protocol and documented in patient medical record  Patient monitored over approximately  days  Discharge from program based on self-management readiness    See care coordination encounters for additional details.

## 2025-03-18 ENCOUNTER — CARE COORDINATION (OUTPATIENT)
Facility: CLINIC | Age: 85
End: 2025-03-18

## 2025-03-18 NOTE — CARE COORDINATION
Kit Order   Remote Patient Kit Ordering Note      Date/Time:  3/18/2025 10:41 AM      [facility]/family today to notify of RPM kit order; patient/family was unavailable; Left HIPAA compliant voicemail regarding RPM kit.    [] [facility]  [] Patient will receive package over the next 1-3 business days. Someone 21 years or older must be present to sign for UPS delivery.  [] HRS will contact patient within 24 hours, an HRS  will call the patient directly: If the patient does not answer, HRS will follow up with the clinical team notifying them about the unsuccessful attempt to contact the patient. HRS will make three call attempts to the patient.Provide patient with [facility] Virtual install number is: [phone].  [] The RPM Nurse will contact patient once equipment is active to welcome them to the program.                                                         [] Hours of RPM monitoring - Monday-Friday 5098-6193; encourage patient to get vitals entered by Noon each day to have the alert addressed same day.  [x][facility] mailed RPM Patient flyer to patient.                      CTN made aware the RPM kit has been ordered.

## 2025-03-19 ENCOUNTER — TELEPHONE (OUTPATIENT)
Age: 85
End: 2025-03-19

## 2025-03-19 ENCOUNTER — CARE COORDINATION (OUTPATIENT)
Facility: CLINIC | Age: 85
End: 2025-03-19

## 2025-03-19 NOTE — TELEPHONE ENCOUNTER
Received call regarding patient from Rhiannon Hinds RN, BSN, HN-BC, Manager of Care Coordination in [facility]. Patient needs follow up within 7 days of discharge- he was discharged from [facility] yesterday. He is a patient of Dr. Posey. Reviewed MD schedule and confirmed there is availability for tomorrow for this patient. Plan to call patient to coordinate appointment.

## 2025-03-19 NOTE — CARE COORDINATION
Care Transitions Note    Initial Call - Call within 2 business days of discharge: Yes    Patient Current Location:  [place]    Care Transition Nurse contacted the spouse/partner  by telephone to perform post hospital discharge assessment, verified name and  as identifiers.  Provided introduction to self, and explanation of the Care Transition Nurse role.    Mrs Gloria Corado is listed on the PHI form on file.     Patient: Jose Corado Jr    Patient : 1940   MRN: [id number]        Reason for Admission, per chart review   - Shortness of Breath   - Peripheral Edema          Was this an external facility discharge? Yes. Discharge Date: 3- . Facility Name: [facility]     Additional needs identified to be addressed with provider   Spouse reports that patient cannot stand by himself.     CTN reviewed with spouse to please follow up with medical provides , after hours providers, or emergency services for medical concerns or urgent needs.   Spouse verbalized understanding this information.               Method of communication with provider: chart routing.    Patients top risk factors for readmission:   - Patient is over the age of 80  - 8 or more current medications       Interventions to address risk factors:   The Initial Care Transitions Outreach was  initiated with spouse   Family  was encouraged  to please follow up with medical providers for any concerns or questions. Patient voiced understanding this information.      Home Health - CTN called [facility] as listed on patient's discharge instructions.  CTN attempting to verify receipt of home care orders and date for start of care.  [facility] and awaiting a return phone call.     Referral - With verbal permission from patient's spouse, a referral was placed to the [facility] with the [facility] Care Coordination Team for assistance with medical transportation and

## 2025-03-20 ENCOUNTER — CARE COORDINATION (OUTPATIENT)
Facility: CLINIC | Age: 85
End: 2025-03-20

## 2025-03-26 ENCOUNTER — CARE COORDINATION (OUTPATIENT)
Facility: CLINIC | Age: 85
End: 2025-03-26

## 2025-03-26 NOTE — CARE COORDINATION
Care Transitions Note    Follow Up Call     Noted Patient HRS/RPM status is pre-activated .     Noted Patient is currently admitted to [facility].

## 2025-03-27 ENCOUNTER — TELEPHONE (OUTPATIENT)
Facility: CLINIC | Age: 85
End: 2025-03-27

## 2025-03-27 ENCOUNTER — CARE COORDINATION (OUTPATIENT)
Facility: CLINIC | Age: 85
End: 2025-03-27

## 2025-03-27 NOTE — CARE COORDINATION
Inpatient notification received. Patient currently admitted to [facility].  program on hold.

## 2025-03-27 NOTE — TELEPHONE ENCOUNTER
Pt daughter called and wanted to let Dr. Oneill know that pt is in the hospital in room 154 I believe. Please advise if you have any additional questions

## 2025-03-28 ENCOUNTER — TELEPHONE (OUTPATIENT)
Age: 85
End: 2025-03-28

## 2025-03-28 NOTE — TELEPHONE ENCOUNTER
Patient wife called to let us know patient was in hospital on 3/16 and 3/25 and want to know if he need to come in earlier than June.

## 2025-03-31 ENCOUNTER — CARE COORDINATION (OUTPATIENT)
Facility: CLINIC | Age: 85
End: 2025-03-31

## 2025-03-31 NOTE — CARE COORDINATION
Care Transitions Note    Initial Call - Call within 2 business days of discharge: Yes    Attempted to reach patient for transitions of care follow up. Unable to reach patient.    Outreach Attempts:   Multiple attempts to contact patient at phone numbers on file.     Patient: Jose Corado Jr    Patient : 1940   MRN: [id number]    Reason for Admission: Acute L knee pain r/t acute gout flare ,Acute on chronic congestive heart failure   Discharge Date: 24  RURS: Readmission Risk Score: 14.2    Last Discharge Facility       Date Complaint Diagnosis Description Type Department Provider    4/15/24 Hand Pain; Rash Acute gout of right wrist, unspecified cause ... ED to Hosp-Admission (Discharged) (ADMITTED) [id number] Yoo MD; Ravinder Polanco..            Was this an external facility discharge? Yes. Discharge Date: 3/29/24. Facility Name: [facility].     Follow Up Appointment:   Patient has hospital follow up appointment scheduled within 7 days of discharge.    Future Appointments         Provider Specialty Dept Phone    2025 8:00 AM Felton Oneill MD Family Medicine [phone]    2025 8:30 AM Barbara Posey MD Cardiology [phone]    2025 10:30 AM Felton Oneill MD Family Medicine [phone]            Plan for follow-up on next business day.      Laurita Desai RN

## 2025-04-01 ENCOUNTER — CARE COORDINATION (OUTPATIENT)
Facility: CLINIC | Age: 85
End: 2025-04-01

## 2025-04-01 NOTE — CARE COORDINATION
Care Transitions Note    Initial Call - Call within 2 business days of discharge: Yes    Patient Current Location:  Home: Unable to determine. Patient is driving while speaking with CTN. Conversation kept short for safety reason.       Patient: Jose Corado Jr    Patient : 1940   MRN: [id number]    Reason for Admission: Acute L knee pain r/t acute gout flare ,Acute on chronic congestive heart failure   Discharge Date: 24  RURS: Readmission Risk Score: 14.2    Last Discharge Facility       Date Complaint Diagnosis Description Type Department Provider    4/15/24 Hand Pain; Rash Acute gout of right wrist, unspecified cause ... ED to Hosp-Admission (Discharged) (ADMITTED) [id number] Yoo MD; Ravinder Polanco..            Was this an external facility discharge? Yes. Discharge Date: 3/29/24. Facility Name: [facility].     Care Summary Note:     CTN attempt to reach patient on phone for follow up.   CTN reached Patient's spouse Mrs. Corado (listed on pt. PHI/Perm on file)  on phone.   Patient spouse reports that Patient is doing okay.   Patient's spouse reports that she has not received any call from Home health OT. Patient's spouse states that she doesn't know the name of home health.   Patient spouse reports that Patient has personal care aide through [employer] personal care aide  total of 41 hrs week. [facility]'s [facility].   Patient's spouse made aware of Pt. PCP appt. Tomorrow.     Patient spouse reports that she is currently driving at this time.   CTN kept the conversation short for safety reason as patient's spouse is driving.   Patient verbalizing needs for transportation resources.   CTN advised Patient's spouse to call Patient insurance for transportation benefits. Patient's spouse  states that she will do so.       CTN attempted to reach [facility] tea lead and inpatient case management x2 to follow up on Patient home health. Unable to reach. CTN

## 2025-04-02 ENCOUNTER — CARE COORDINATION (OUTPATIENT)
Facility: CLINIC | Age: 85
End: 2025-04-02

## 2025-04-02 NOTE — CARE COORDINATION
Care Transitions Note    Initial Call - Call within 2 business days of discharge: Yes    Attempted to reach patient for transitions of care follow up. Unable to reach patient.    Outreach Attempts:   HIPAA compliant voicemail left for patient.       Patient: Jose Corado Jr    Patient : 1940   MRN: [id number]    Reason for Admission: Acute L knee pain r/t acute gout flare ,Acute on chronic congestive heart failure   Discharge Date: 24  RURS: Readmission Risk Score: 14.2    Last Discharge Facility       Date Complaint Diagnosis Description Type Department Provider    4/15/24 Hand Pain; Rash Acute gout of right wrist, unspecified cause ... ED to Hosp-Admission (Discharged) (ADMITTED) [id number] Yoo MD; Ravinder Polanco..            Was this an external facility discharge? Yes. Discharge Date: 3/29/24. Facility Name: [facility].       CTN attempted to reach [facility]  inpatient case management Ms. Esparza  to follow up on Patient home health. Unable to reach. CTN left a voice message requesting a call back. No Patient/medical/health information left on message.     CTN spoke with Ms. Esparza ICM  of [facility] and she informed CTN that 3 home health declined and so far no accepting home health agency.     Ms. Esparza states that the she will call Patient to make aware.     Follow Up Appointment:   Patient has hospital follow up appointment scheduled greater than 14 days after discharge; noted Patient missed PCP appt. Today 25 .      Future Appointments         Provider Specialty Dept Phone    4/15/2025 10:30 AM Felton Oneill MD Family Medicine [phone]    2025 8:30 AM Barbara Posey MD Cardiology [phone]    2025 10:30 AM Fetlon Oneill MD Family Medicine [phone]            Plan for follow-up on next business day.      Laurita Desai RN

## 2025-04-03 ENCOUNTER — CARE COORDINATION (OUTPATIENT)
Facility: CLINIC | Age: 85
End: 2025-04-03

## 2025-04-03 NOTE — CARE COORDINATION
Care Transitions Note    Follow Up Call     CTN called [facility] and spoke with . CTN inquire if [facility] accepts Patient insurance [employer] P[employer] Medicare.      Ms. Hemphill states that [facility] intake teams states that they accept Patient insurance but will not be able to accept referral until wednesday next week due to staffing .     Noted [facility] MSW attempted to contact Patient's spouse today. Will attempt to contact Patient/spouse some other  time.

## 2025-04-03 NOTE — CARE COORDINATION
Reviewed VIGNESH. Attempted to contact patient spouse, Gloria, for Complex Care Management. No answer, left message and provided contact information. Will attempt contact at a later time.    Nika Vance LMSW  Care Management        This patient was evaluated for sepsis.  At this time, a diagnosis of sepsis is not supported by the overall clinical picture.

## 2025-04-04 ENCOUNTER — CARE COORDINATION (OUTPATIENT)
Facility: CLINIC | Age: 85
End: 2025-04-04

## 2025-04-04 NOTE — CARE COORDINATION
2nd attempt to reach  Attempted to contact patient spouse for Complex Care Management. Attempted both numbers listed. No answer, left message and provided contact information. Will attempt contact at a later time.    Nika Vance LMSW  Care Management

## 2025-04-07 ENCOUNTER — CARE COORDINATION (OUTPATIENT)
Facility: CLINIC | Age: 85
End: 2025-04-07

## 2025-04-07 NOTE — CARE COORDINATION
Attempted to contact patient spouse for Complex Care Management. No answer, left message and provided contact information. Will attempt contact at a later time.    Nika Vance LMSW  Care Management

## 2025-04-08 ENCOUNTER — CARE COORDINATION (OUTPATIENT)
Facility: CLINIC | Age: 85
End: 2025-04-08

## 2025-04-08 NOTE — CARE COORDINATION
Care Transitions Note    Follow Up Call     Attempted to reach patient/ Pt. Spouse for transitions of care follow up.  Unable to reach patient/Pt. Spouse.       Outreach Attempts:   Multiple attempts to contact patient, spouse/partner  at phone numbers on file.     Care Summary Note:     I have been unable to reach Patient/spouse on phone. CTN will refer to ACM . ACM referral sent.      Care Transition Nurse identified needs for Ambulatory Care Management.   The following challenges have been identified  RPM, follow up Home health, disease management.   Needs addressed:  home health, follow up if patient /spouse still has RPM equipments  Patients top risk factors for readmission: medical condition-CHF, DM , recent hospital admissions.       Follow Up Appointment:   Future Appointments         Provider Specialty Dept Phone    4/15/2025 9:00 AM Natalie Bee APRN - NP Cardiology [phone]    4/15/2025 10:30 AM Felton Oneill MD Family Medicine [phone]    9/29/2025 10:30 AM Felton Oneill MD Family Medicine [phone]            Laurita Desai RN

## 2025-04-09 ENCOUNTER — CARE COORDINATION (OUTPATIENT)
Facility: CLINIC | Age: 85
End: 2025-04-09

## 2025-04-11 ENCOUNTER — CARE COORDINATION (OUTPATIENT)
Facility: CLINIC | Age: 85
End: 2025-04-11

## 2025-04-11 NOTE — CARE COORDINATION
Care Transitions Note    Final Call     Noted ACM is following Patient for continued management. I appreciate assistance .   No transition of care outreach indicated.  Care Transition Program is closed and resolved.    Upcoming Appointments:    Future Appointments         Provider Specialty Dept Phone    4/15/2025 9:00 AM Natalie Bee APRN - NP Cardiology [phone]    4/15/2025 10:30 AM Felton Oneill MD Family Medicine [phone]    9/29/2025 10:30 AM Felton Oneill MD Family Medicine [phone]          Laurita Desai RN

## 2025-04-14 ENCOUNTER — CARE COORDINATION (OUTPATIENT)
Facility: CLINIC | Age: 85
End: 2025-04-14

## 2025-04-14 NOTE — CARE COORDINATION
Attempted to contact patient spouse for Complex Care Management. No answer, left message and provided contact information. Will attempt contact at a later time.Also attempted to reach  [facility] social work department. Left a message.     Addendum:  Received call from [facility]. [facility] PCP as Dr. Ashley Yun & SW as Ms. Avila at ext [phone].Attempted to reach. Unable to leave message. Left message at primary  office.       April ROSALINDA Vance  Care Management

## 2025-04-15 ENCOUNTER — OFFICE VISIT (OUTPATIENT)
Facility: CLINIC | Age: 85
End: 2025-04-15
Payer: MEDICARE

## 2025-04-15 VITALS
HEIGHT: 69 IN | TEMPERATURE: 97.7 F | WEIGHT: 237 LBS | OXYGEN SATURATION: 95 % | BODY MASS INDEX: 35.1 KG/M2 | SYSTOLIC BLOOD PRESSURE: 110 MMHG | DIASTOLIC BLOOD PRESSURE: 65 MMHG | HEART RATE: 51 BPM | RESPIRATION RATE: 20 BRPM

## 2025-04-15 DIAGNOSIS — R53.81 PHYSICAL DEBILITY: ICD-10-CM

## 2025-04-15 DIAGNOSIS — I10 ESSENTIAL (PRIMARY) HYPERTENSION: ICD-10-CM

## 2025-04-15 DIAGNOSIS — J44.9 CHRONIC OBSTRUCTIVE PULMONARY DISEASE, UNSPECIFIED COPD TYPE (HCC): ICD-10-CM

## 2025-04-15 DIAGNOSIS — I50.23 ACUTE ON CHRONIC SYSTOLIC CONGESTIVE HEART FAILURE (HCC): Primary | ICD-10-CM

## 2025-04-15 DIAGNOSIS — I25.708 ATHEROSCLEROSIS OF CORONARY ARTERY BYPASS GRAFT(S), UNSPECIFIED, WITH OTHER FORMS OF ANGINA PECTORIS: ICD-10-CM

## 2025-04-15 DIAGNOSIS — Z09 HOSPITAL DISCHARGE FOLLOW-UP: ICD-10-CM

## 2025-04-15 DIAGNOSIS — G47.00 INSOMNIA, UNSPECIFIED TYPE: ICD-10-CM

## 2025-04-15 DIAGNOSIS — N18.30 STAGE 3 CHRONIC KIDNEY DISEASE, UNSPECIFIED WHETHER STAGE 3A OR 3B CKD (HCC): ICD-10-CM

## 2025-04-15 DIAGNOSIS — R41.3 MEMORY DIFFICULTY: ICD-10-CM

## 2025-04-15 DIAGNOSIS — F39 MOOD DISORDER: ICD-10-CM

## 2025-04-15 DIAGNOSIS — E87.6 HYPOKALEMIA: ICD-10-CM

## 2025-04-15 DIAGNOSIS — E11.21 TYPE 2 DIABETES WITH NEPHROPATHY (HCC): ICD-10-CM

## 2025-04-15 DIAGNOSIS — M10.9 GOUT, UNSPECIFIED CAUSE, UNSPECIFIED CHRONICITY, UNSPECIFIED SITE: ICD-10-CM

## 2025-04-15 PROCEDURE — G8427 DOCREV CUR MEDS BY ELIG CLIN: HCPCS | Performed by: FAMILY MEDICINE

## 2025-04-15 PROCEDURE — 1160F RVW MEDS BY RX/DR IN RCRD: CPT | Performed by: FAMILY MEDICINE

## 2025-04-15 PROCEDURE — 99215 OFFICE O/P EST HI 40 MIN: CPT | Performed by: FAMILY MEDICINE

## 2025-04-15 PROCEDURE — 1111F DSCHRG MED/CURRENT MED MERGE: CPT | Performed by: FAMILY MEDICINE

## 2025-04-15 PROCEDURE — G8417 CALC BMI ABV UP PARAM F/U: HCPCS | Performed by: FAMILY MEDICINE

## 2025-04-15 PROCEDURE — 1123F ACP DISCUSS/DSCN MKR DOCD: CPT | Performed by: FAMILY MEDICINE

## 2025-04-15 PROCEDURE — 1036F TOBACCO NON-USER: CPT | Performed by: FAMILY MEDICINE

## 2025-04-15 PROCEDURE — 3023F SPIROM DOC REV: CPT | Performed by: FAMILY MEDICINE

## 2025-04-15 PROCEDURE — 3074F SYST BP LT 130 MM HG: CPT | Performed by: FAMILY MEDICINE

## 2025-04-15 PROCEDURE — 3078F DIAST BP <80 MM HG: CPT | Performed by: FAMILY MEDICINE

## 2025-04-15 PROCEDURE — 1159F MED LIST DOCD IN RCRD: CPT | Performed by: FAMILY MEDICINE

## 2025-04-15 RX ORDER — GUAIFENESIN 600 MG/1
TABLET, EXTENDED RELEASE ORAL
COMMUNITY
Start: 2025-04-03 | End: 2025-04-15

## 2025-04-15 RX ORDER — COLCHICINE 0.6 MG/1
0.6 TABLET ORAL DAILY
Qty: 30 TABLET | Refills: 2 | Status: SHIPPED | OUTPATIENT
Start: 2025-04-15

## 2025-04-15 RX ORDER — MAGNESIUM 200 MG
200 TABLET ORAL DAILY
Qty: 90 TABLET | Refills: 1 | Status: SHIPPED | OUTPATIENT
Start: 2025-04-15 | End: 2025-04-16 | Stop reason: ALTCHOICE

## 2025-04-15 RX ORDER — DEXTROMETHORPHAN HYDROBROMIDE AND PROMETHAZINE HYDROCHLORIDE 15; 6.25 MG/5ML; MG/5ML
5 SYRUP ORAL 4 TIMES DAILY PRN
Qty: 240 ML | Refills: 0 | Status: SHIPPED | OUTPATIENT
Start: 2025-04-15

## 2025-04-15 RX ORDER — MIRTAZAPINE 7.5 MG/1
7.5 TABLET, FILM COATED ORAL NIGHTLY
Qty: 90 TABLET | Refills: 1 | Status: SHIPPED | OUTPATIENT
Start: 2025-04-15

## 2025-04-15 RX ORDER — MULTIVITAMIN WITH FOLIC ACID 400 MCG
TABLET ORAL
COMMUNITY
Start: 2025-03-06 | End: 2025-04-15 | Stop reason: SDUPTHER

## 2025-04-15 RX ORDER — BUMETANIDE 1 MG/1
2 TABLET ORAL DAILY
Qty: 90 TABLET | Refills: 1 | Status: SHIPPED | OUTPATIENT
Start: 2025-04-15

## 2025-04-15 RX ORDER — DOXYCYCLINE HYCLATE 100 MG
100 TABLET ORAL 2 TIMES DAILY
Qty: 20 TABLET | Refills: 0 | Status: SHIPPED | OUTPATIENT
Start: 2025-04-15 | End: 2025-04-25

## 2025-04-15 RX ORDER — METOPROLOL TARTRATE 25 MG/1
TABLET, FILM COATED ORAL
COMMUNITY
Start: 2025-04-03

## 2025-04-15 RX ORDER — COLCHICINE 0.6 MG/1
0.6 TABLET ORAL DAILY
COMMUNITY
Start: 2025-04-02 | End: 2025-04-15 | Stop reason: SDUPTHER

## 2025-04-15 RX ORDER — MEMANTINE HYDROCHLORIDE 5 MG/1
5 TABLET ORAL DAILY
Qty: 90 TABLET | Refills: 1 | Status: SHIPPED | OUTPATIENT
Start: 2025-04-15

## 2025-04-15 RX ORDER — APIXABAN 2.5 MG/1
TABLET, FILM COATED ORAL
COMMUNITY
Start: 2025-03-06

## 2025-04-15 RX ORDER — POTASSIUM CHLORIDE 1500 MG/1
20 TABLET, EXTENDED RELEASE ORAL DAILY
Qty: 7 TABLET | Refills: 0 | Status: CANCELLED | OUTPATIENT
Start: 2025-04-15

## 2025-04-15 NOTE — PROGRESS NOTES
\"Have you been to the ER, urgent care clinic since your last visit?  Hospitalized since your last visit?\"    YES - When: approximately . ago.  Where and Why: 2 wks   [facility].    “Have you seen or consulted any other health care providers outside our system since your last visit?”    YES - When: [facility] .. [facility]  [facility]      
[facility] and [facility]  He will also get a different wheelchair which I suspect is probably [facility] mobility [facility] the [facility].  He is getting home health done.  Continue current treatment plan.  GERD: Patient has gastroesophageal reflux disease.  He gets heartburn on and off.  He is on pantoprazole.  Will continue current treatment plan.  Neuropathy: Patient has neuropathy with numbness and tingling lower extremities.  He takes gabapentin.  Continue current treatment plan.  Mood disorder: Patient has mood disorder with anxiety and depression.  He is on mirtazapine at bedtime.  He will continue with this medication.  Insomnia: Patient is on mirtazapine.  Takes this at bedtime.  Continue current treatment plan.  Hypokalemia: We will recheck labs and I will follow-up at next visit or sooner as needed.    Past Medical History  Past Medical History:   Diagnosis Date    CAD (coronary artery disease)     Congestive heart failure (HCC)     Diabetes (HCC)     Heart failure     History of low potassium     Hypertension     Osteoarthritis of both knees     Stroke (HCC)        Surgical History  Past Surgical History:   Procedure Laterality Date    EYE SURGERY      MAy 2 and May 17 cataract sugery in both eyes    OK UNLISTED PROCEDURE CARDIAC SURGERY  2007    bypass    TCAT IMPL WRLS P-ART PRS SNR L-T HEMODYN MNTR Right 2/10/2020    (Cardio MEMS) IMPLANT HEART FAILURE MONITORING DEVICE performed by Mirna Velasco MD at [facility] CARDIAC CATH LAB        Medications  Current Outpatient Medications   Medication Sig Dispense Refill    colchicine (COLCRYS) 0.6 MG tablet Take 1 tablet by mouth daily      metoprolol tartrate (LOPRESSOR) 25 MG tablet       guaiFENesin (MUCINEX) 600 MG extended release tablet       ELIQUIS 2.5 MG TABS tablet       bumetanide (BUMEX) 1 MG tablet Take 2 tablets by mouth daily      magnesium 200 MG TABS tablet Take 1 tablet by mouth daily 90 tablet 1    Multiple Vitamins-Minerals

## 2025-04-16 ENCOUNTER — CARE COORDINATION (OUTPATIENT)
Facility: CLINIC | Age: 85
End: 2025-04-16

## 2025-04-16 ENCOUNTER — OFFICE VISIT (OUTPATIENT)
Age: 85
End: 2025-04-16

## 2025-04-16 VITALS
HEART RATE: 74 BPM | OXYGEN SATURATION: 99 % | BODY MASS INDEX: 35.1 KG/M2 | WEIGHT: 237 LBS | SYSTOLIC BLOOD PRESSURE: 105 MMHG | HEIGHT: 69 IN | DIASTOLIC BLOOD PRESSURE: 57 MMHG

## 2025-04-16 DIAGNOSIS — I10 ESSENTIAL HYPERTENSION: Primary | ICD-10-CM

## 2025-04-16 DIAGNOSIS — I48.91 ATRIAL FIBRILLATION (HCC): Primary | ICD-10-CM

## 2025-04-16 DIAGNOSIS — E11.21 TYPE 2 DIABETES WITH NEPHROPATHY (HCC): ICD-10-CM

## 2025-04-16 RX ORDER — CALCIUM CARBONATE 300MG(750)
200 TABLET,CHEWABLE ORAL DAILY
Qty: 90 TABLET | Refills: 2 | Status: SHIPPED | OUTPATIENT
Start: 2025-04-16

## 2025-04-16 ASSESSMENT — PATIENT HEALTH QUESTIONNAIRE - PHQ9
SUM OF ALL RESPONSES TO PHQ QUESTIONS 1-9: 0
SUM OF ALL RESPONSES TO PHQ QUESTIONS 1-9: 0
2. FEELING DOWN, DEPRESSED OR HOPELESS: NOT AT ALL
SUM OF ALL RESPONSES TO PHQ QUESTIONS 1-9: 0
SUM OF ALL RESPONSES TO PHQ QUESTIONS 1-9: 0
1. LITTLE INTEREST OR PLEASURE IN DOING THINGS: NOT AT ALL

## 2025-04-16 ASSESSMENT — ENCOUNTER SYMPTOMS
SHORTNESS OF BREATH: 1
GASTROINTESTINAL NEGATIVE: 1
EYES NEGATIVE: 1
RHINORRHEA: 0
COUGH: 1

## 2025-04-16 NOTE — PROGRESS NOTES
1. Have you been to the ER, urgent care clinic since your last visit?  Hospitalized since your last visit?     Yes, [facility]    2. Have you seen or consulted any other health care providers outside of the [facility] since your last visit?  Include any pap smears or colon screening.      No

## 2025-04-16 NOTE — CARE COORDINATION
Ambulatory Care Coordination Note     2025 9:22 AM     Patient Current Location:  Home:  [facility] [address]     ACM contacted the patient by telephone. Verified name and  with patient as identifiers.         ACM: Asif Hernandez RN     Challenges to be reviewed by the provider   Additional needs identified to be addressed with provider No  Order placed to enroll patient in RPM               Method of communication with provider: phone.    Utilization: Patient has not had any utilization since our last call.     Care Summary Note:   HTN Teaching:        Always take medication as prescribed . Do not skip or stop medication unless specifically instructed to by MD or PCP. If you forget to take a dose, take it as soon as you remember. However, if it is almost time for your next dose, skip the missed dose and go back to your original schedule. Discussed symptoms of HTN - low sodium diet    Anti-Coagulation Medication Teaching   ACM teaching Patient the importance of taking anti-coagulations such as Plavix and Eliquis. Interactions with ASA as well as NSAIDS. Explained the importance of diet when on anticoag. Avoid green leafy vegetables. Do not take laxatives unless cleared by physician. Use caution with supplements and vitamins - do not take anything containing Vitamin E unless prescribed by MD. When in doubt - ask PCP. Advised to report any bruising issues to PCP or Cardiology and be aware of activity that may cause injury resulting in bleeding. Be aware of any bleeding with urination or bowel movement. Take medication only as prescribed.    Scheduled to see cardiologist today.    Further / follow up appointments listed below - reviewed upcoming appointments  Further questions answered as needed and patient has ACM contact information  Review of medications with attention to any side effects and determination that patient has sufficient quantity of meds and/or refills.  Assessment done with

## 2025-04-16 NOTE — PROGRESS NOTES
Remote Patient Monitoring Treatment Plan    Received request from [facility]/Asif Herrera RN   to order remote patient monitoring for in home monitoring of Diabetes; Condition managed by PCP.  HTN; Condition managed by PCP.  and order completed.     Patient will be monitoring blood pressure   glucose.      Patient will engage in Remote Patient Monitoring each day to develop the skills necessary for self management.       RPM Care Team Responsibilities:   Alerts will be reviewed daily and addressed within 2-4 hours during operational hours (Monday -Friday 9 am-4 pm)  Alert response and intervention documented in patient medical record  Alert response escalated to PCP per protocol and documented in patient medical record  Patient monitored over approximately  days  Discharge from program based on self-management readiness    See care coordination encounters for additional details.

## 2025-04-16 NOTE — PROGRESS NOTES
Jose Corado Jr is a 84 y.o. year old male.    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  According to wife Lynette pillow is nonoperational.  She is adjusting metolazone as needed for edema and is scheduled to have labs drawn as ordered by PCP today.  12/16/2024 AVILA NYHA3, edema chronic but reduces with metolazone which wife gives 3 times a week when she sees the swelling getting worse.  No chest pain or dizziness.  3/6/2025  Seen following recent admission for acute on chronic CHF and influenza he continues to complain of fatigue and dyspnea on exertion.  He did not have fasting blood work drawn prior to today's visit due to acute illness.  He continues to complain of fatigue chest cough and congestion.  He is no longer febrile medications adjusted during hospitalization currently without edema denies chest pain or palpitations.  He is now on oxygen at 2 L nasal cannula  4/16/2025  Seen following multiple admissions for acute on chronic heart failure with reduced EF.          Review of Systems   Constitutional:  Positive for fatigue.   HENT:  Negative for rhinorrhea.    Eyes: Negative.    Respiratory:  Positive for cough and shortness of breath.    Cardiovascular:  Negative for leg swelling.   Gastrointestinal: Negative.    Endocrine: Negative.    Genitourinary: Negative.    Musculoskeletal: Negative.    Neurological: Negative.    Psychiatric/Behavioral: Negative.     All other systems reviewed and are negative.        Physical Exam  Vitals and nursing note reviewed.   Constitutional:       Appearance: Normal appearance. He is obese.   HENT:      Head: Normocephalic and atraumatic.      Nose: Nose normal.   Eyes:      Conjunctiva/sclera: Conjunctivae normal.   Cardiovascular:      Rate and Rhythm: Normal rate and regular rhythm.

## 2025-04-17 ENCOUNTER — CARE COORDINATION (OUTPATIENT)
Facility: CLINIC | Age: 85
End: 2025-04-17

## 2025-04-17 NOTE — CARE COORDINATION
Kit Order   Remote Patient Kit Ordering Note      Date/Time:  4/17/2025 10:55 AM      [facility] call to [facility]/family today to notify of RPM kit order; patient/family was unavailable; Left HIPAA compliant voicemail regarding RPM kit.    [] [facility] shipping address  [] Patient will receive package over the next 1-3 business days. Someone 21 years or older must be present to sign for UPS delivery.  [] HRS will contact patient within 24 hours, an HRS  will call the patient directly: If the patient does not answer, HRS will follow up with the clinical team notifying them about the unsuccessful attempt to contact the patient. HRS will make three call attempts to the patient.Provide patient with [facility] Virtual install number is: [phone].  [] The RPM Nurse will contact patient once equipment is active to welcome them to the program.                                                         [] Hours of RPM monitoring - Monday-Friday 1429-2509; encourage patient to get vitals entered by Noon each day to have the alert addressed same day.  [x][facility] mailed RPM Patient flyer to patient.                      ACM made aware the RPM kit has been ordered.

## 2025-04-22 ENCOUNTER — CARE COORDINATION (OUTPATIENT)
Facility: CLINIC | Age: 85
End: 2025-04-22

## 2025-04-23 ENCOUNTER — CARE COORDINATION (OUTPATIENT)
Facility: CLINIC | Age: 85
End: 2025-04-23

## 2025-04-23 ASSESSMENT — PATIENT HEALTH QUESTIONNAIRE - PHQ9
1. LITTLE INTEREST OR PLEASURE IN DOING THINGS: SEVERAL DAYS
SUM OF ALL RESPONSES TO PHQ QUESTIONS 1-9: 2
SUM OF ALL RESPONSES TO PHQ QUESTIONS 1-9: 2
2. FEELING DOWN, DEPRESSED OR HOPELESS: SEVERAL DAYS
SUM OF ALL RESPONSES TO PHQ QUESTIONS 1-9: 2
SUM OF ALL RESPONSES TO PHQ QUESTIONS 1-9: 2

## 2025-04-23 NOTE — CARE COORDINATION
Ambulatory Care Coordination Note     2025 4:05 PM     Patient Current Location:  Home:  [facility] [address]     ACM contacted the spouse/partner by telephone. Verified name and  with spouse/partner as identifiers.         ACM: Asif Hernandez RN     Challenges to be reviewed by the provider   Additional needs identified to be addressed with provider No  none               Method of communication with provider: phone.    Utilization: Patient has not had any utilization since our last call.     Care Summary Note:   All conversations and all information obtained is through the patient's wife due to the patient suffering from dementia. Patient's wife states that periods of dementia sometimes seem to be more pronounced than others over the last week.  No notable change in Patient health status from last encounter. Follow up appointments listed below and questions from Patient / Care Giver answered.  Patient has ACM contact information.    Attention to medication at this encounter to any side effects and determination that patient has sufficient quantity of meds and/or refills. Shows understanding of medication therapy  Assessment done with attention to primary illness and / or condition.  Respiratory and cardiac status shows no issues at present  Education and advise regarding ambulation safety in the home     Multifactorial Fall Prevention Interventions  Fall prevention education.  Exercise-based program.  Home modification.  Assistive devices, such as a cane or walker.  Medication management.  Addressing health issues that increase the risk of falling, such as vision issues or foot problems.  Proper footwear.    Tips for preventing falls in the home include:  immediately mopping up spillages.  removing clutter, trailing wires and frayed carpet.  using non-slip mats and rugs.  making sure all rooms, passages and staircases are well lit.    Offered patient enrollment in the Remote Patient

## 2025-05-01 ENCOUNTER — CARE COORDINATION (OUTPATIENT)
Facility: CLINIC | Age: 85
End: 2025-05-01

## 2025-05-02 ENCOUNTER — CARE COORDINATION (OUTPATIENT)
Facility: CLINIC | Age: 85
End: 2025-05-02

## 2025-05-02 NOTE — CARE COORDINATION
Patient Current Location:  Home: [address] [facility] [address]         Ambulatory Care Coordination Note     5/2/2025 3:52 PM     Patient outreach attempt by this ACM today to perform care management follow up . ACM was unable to reach the patient by telephone today;   left voice message requesting a return phone call to this ACM.     ACM: Asif Hernandez RN     Care Summary Note: NA  According to HRS spread sheet patient is waiting on RPM delivery.    PCP/Specialist follow up:   Future Appointments         Provider Specialty Dept Phone    5/15/2025 10:30 AM Felton Oneill MD Family Medicine [phone]    6/4/2025 10:00 AM Barbara Posey MD Cardiology [phone]    9/29/2025 10:30 AM Felton Oneill MD Family Medicine [phone]            Follow Up:   Plan for next ACM outreach in approximately 1 week to complete:  - medication review  - advance care planning  - goal progression  - education   - RPM.

## 2025-05-07 ENCOUNTER — CARE COORDINATION (OUTPATIENT)
Facility: CLINIC | Age: 85
End: 2025-05-07

## 2025-05-07 ASSESSMENT — PATIENT HEALTH QUESTIONNAIRE - PHQ9
2. FEELING DOWN, DEPRESSED OR HOPELESS: SEVERAL DAYS
SUM OF ALL RESPONSES TO PHQ QUESTIONS 1-9: 2
SUM OF ALL RESPONSES TO PHQ QUESTIONS 1-9: 2
1. LITTLE INTEREST OR PLEASURE IN DOING THINGS: SEVERAL DAYS
SUM OF ALL RESPONSES TO PHQ QUESTIONS 1-9: 2
SUM OF ALL RESPONSES TO PHQ QUESTIONS 1-9: 2

## 2025-05-07 NOTE — CARE COORDINATION
Ambulatory Care Coordination Note     2025 1:05 PM     Patient Current Location:  Home:  [facility] [address]     ACM contacted the patient by telephone. Verified name and  with patient as identifiers.         ACM: Asif Hernandez RN     Challenges to be reviewed by the provider   Additional needs identified to be addressed with provider No  none               Method of communication with provider: phone.    Utilization: Patient has not had any utilization since our last call.     Care Summary Note:   Patient and wife are starting to have more frequent and honest conversations regarding Mr. Corado's dementia. The patient's wife stated they're putting plans in place to care for financial needs and medical needs. They have a  referral and ACM will contact  as needed for support groups and other ways the family could be supported. At this point Mr. Corado is reporting minor memory lapses which he says are not much different than they were a few months ago. ACM emphasized ways for the patient and his wife to decrease stress as much as possible, as stress would only make matters worse. Patient's wife has ACM and 's contact information.    Offered patient enrollment in the Remote Patient Monitoring (RPM) program for in-home monitoring: Yes, patient enrolled; current status is awaiting kit.     Assessments Completed:   No changes since last call    Medications Reviewed:   Patient denies any changes with medications and reports taking all medications as prescribed. and Completed during this call    Advance Care Planning:   Not on file; education provided     Care Planning:    Goals Addressed                   This Visit's Progress     Attend follow up appointments on schedule   On track     Prepare patients and caregivers for end of life decisions (ie. need for hospice, pain management, symptom relief, advance directives etc.)   On track     Take all medications

## 2025-05-12 ENCOUNTER — CARE COORDINATION (OUTPATIENT)
Facility: CLINIC | Age: 85
End: 2025-05-12

## 2025-05-12 NOTE — CARE COORDINATION
Ambulatory Social Work Note    Patient graduated from the Social Work program on 05/12/2025.  At this time all social work goals have been completed and the patient/family has the ability to self-manage. Patient is connected to a [employer]  for ongoing case management and support. Referral for additional transportation resources. This SW provided resources: [employer]-ride,  [facility] transportation contact number, [employer] ride application. Patient also has personal care aides daily, appropriate DME and home ramp is being installed soon.Contacted [facility] for additional transportation benefits. Member plan does not cover [employer] transport. Spouse aware to follow up with [facility]  for specific [facility] resources. Per spouse, she expressed no additional needs at this time. No further social work follow-up scheduled.  Patient/family has social work contact information for further questions, concerns, or needs.           Future Appointments   Date Time Provider Department Center   5/15/2025 10:30 AM Felton Oneill MD [facility] DEP   6/4/2025 10:00 AM Barbara Posey MD [facility] BS [facility]   9/29/2025 10:30 AM Felton Oneill MD [facility] DEP        April Pinky [facility]  Care Management

## 2025-05-12 NOTE — CARE COORDINATION
Ambulatory Care Coordination Note     5/12/2025 4:13 PM     Spouse/Partner outreach attempt by this ACM today to perform care management follow up . ACM was unable to reach the spouse/partner  by telephone today;   left voice message requesting a return phone call to this ACM.     ACM: Asif Hernandez RN     Care Summary Note: NA    PCP/Specialist follow up:   Future Appointments         Provider Specialty Dept Phone    5/15/2025 10:30 AM Felton Oneill MD Family Medicine [phone]    6/4/2025 10:00 AM Barbara Posey MD Cardiology [phone]    9/29/2025 10:30 AM Felton Oneill MD Family Medicine [phone]            Follow Up:   Plan for next ACM outreach in approximately 1 week to complete:  - medication review  - advance care planning  - goal progression  - education   - RPM.

## 2025-05-20 ENCOUNTER — CARE COORDINATION (OUTPATIENT)
Facility: CLINIC | Age: 85
End: 2025-05-20

## 2025-05-20 NOTE — CARE COORDINATION
Ambulatory Care Coordination Note     5/20/2025 2:27 PM     Spouse/Partner outreach attempt by this ACM today to perform care management follow up . ACM was unable to reach the spouse/partner  by telephone today;   left voice message requesting a return phone call to this ACM.     ACM: Asif Hernandez RN     Care Summary Note: NA    PCP/Specialist follow up:   Future Appointments         Provider Specialty Dept Phone    6/4/2025 10:00 AM Barbara Posey MD Cardiology [phone]    9/29/2025 10:30 AM Felton Oneill MD Family Medicine [phone]            Follow Up:   Plan for next AC outreach in approximately 2 weeks to complete:  - medication review  - advance care planning  - goal progression  - education .

## 2025-06-04 ENCOUNTER — CARE COORDINATION (OUTPATIENT)
Facility: CLINIC | Age: 85
End: 2025-06-04

## 2025-06-04 NOTE — CARE COORDINATION
Ambulatory Care Coordination Note     6/4/2025 4:38 PM     Spouse/Partner outreach attempt by this ACM today to perform care management follow up . ACM was unable to reach the spouse/partner  by telephone today;   left voice message requesting a return phone call to this ACM.     ACM: Asif Hernandez RN     Care Summary Note: NA      PCP/Specialist follow up:   Future Appointments         Provider Specialty Dept Phone    6/5/2025 11:30 AM Barbara Posey MD Cardiology [phone]    9/29/2025 10:30 AM Felton Oneill MD Family Medicine [phone]            Follow Up:   Plan for next AC outreach in approximately 2 weeks to complete:  - medication review  - advance care planning  - goal progression  - education   - RPM.

## 2025-06-05 ENCOUNTER — OFFICE VISIT (OUTPATIENT)
Age: 85
End: 2025-06-05
Payer: MEDICARE

## 2025-06-05 VITALS
HEIGHT: 69 IN | HEART RATE: 78 BPM | BODY MASS INDEX: 31.4 KG/M2 | OXYGEN SATURATION: 96 % | WEIGHT: 212 LBS | DIASTOLIC BLOOD PRESSURE: 62 MMHG | SYSTOLIC BLOOD PRESSURE: 115 MMHG

## 2025-06-05 DIAGNOSIS — I25.708 CORONARY ARTERY DISEASE INVOLVING CORONARY BYPASS GRAFT OF NATIVE HEART WITH OTHER FORMS OF ANGINA PECTORIS: ICD-10-CM

## 2025-06-05 DIAGNOSIS — Z95.818 PRESENCE OF CARDIOMEMS HF SYSTEM: ICD-10-CM

## 2025-06-05 DIAGNOSIS — Z79.01 LONG TERM (CURRENT) USE OF ANTICOAGULANTS: ICD-10-CM

## 2025-06-05 DIAGNOSIS — E78.5 DYSLIPIDEMIA: ICD-10-CM

## 2025-06-05 DIAGNOSIS — I50.42 CHRONIC COMBINED SYSTOLIC AND DIASTOLIC CONGESTIVE HEART FAILURE (HCC): Primary | ICD-10-CM

## 2025-06-05 DIAGNOSIS — Z95.1 S/P CABG X 4: ICD-10-CM

## 2025-06-05 DIAGNOSIS — E87.6 HYPOKALEMIA: ICD-10-CM

## 2025-06-05 DIAGNOSIS — I10 ESSENTIAL HYPERTENSION: ICD-10-CM

## 2025-06-05 DIAGNOSIS — I48.11 LONGSTANDING PERSISTENT ATRIAL FIBRILLATION (HCC): ICD-10-CM

## 2025-06-05 DIAGNOSIS — I95.1 ORTHOSTATIC HYPOTENSION: ICD-10-CM

## 2025-06-05 PROCEDURE — 1123F ACP DISCUSS/DSCN MKR DOCD: CPT | Performed by: INTERNAL MEDICINE

## 2025-06-05 PROCEDURE — 99214 OFFICE O/P EST MOD 30 MIN: CPT | Performed by: INTERNAL MEDICINE

## 2025-06-05 PROCEDURE — 1160F RVW MEDS BY RX/DR IN RCRD: CPT | Performed by: INTERNAL MEDICINE

## 2025-06-05 PROCEDURE — 1125F AMNT PAIN NOTED PAIN PRSNT: CPT | Performed by: INTERNAL MEDICINE

## 2025-06-05 PROCEDURE — G8417 CALC BMI ABV UP PARAM F/U: HCPCS | Performed by: INTERNAL MEDICINE

## 2025-06-05 PROCEDURE — 1159F MED LIST DOCD IN RCRD: CPT | Performed by: INTERNAL MEDICINE

## 2025-06-05 PROCEDURE — G8427 DOCREV CUR MEDS BY ELIG CLIN: HCPCS | Performed by: INTERNAL MEDICINE

## 2025-06-05 PROCEDURE — 1036F TOBACCO NON-USER: CPT | Performed by: INTERNAL MEDICINE

## 2025-06-05 RX ORDER — BUMETANIDE 1 MG/1
1 TABLET ORAL DAILY
Qty: 90 TABLET | Refills: 2 | Status: SHIPPED | OUTPATIENT
Start: 2025-06-05

## 2025-06-05 RX ORDER — AMLODIPINE BESYLATE 2.5 MG/1
2.5 TABLET ORAL DAILY
Qty: 90 TABLET | Refills: 3 | Status: SHIPPED | OUTPATIENT
Start: 2025-06-05

## 2025-06-05 RX ORDER — SPIRONOLACTONE 25 MG/1
25 TABLET ORAL DAILY
Qty: 90 TABLET | Refills: 1 | Status: SHIPPED | OUTPATIENT
Start: 2025-06-05

## 2025-06-05 RX ORDER — POTASSIUM CHLORIDE 750 MG/1
10 TABLET, EXTENDED RELEASE ORAL DAILY
Qty: 90 TABLET | Refills: 1 | Status: SHIPPED | OUTPATIENT
Start: 2025-06-05

## 2025-06-05 RX ORDER — CALCIUM CARBONATE 300MG(750)
200 TABLET,CHEWABLE ORAL DAILY
Qty: 90 TABLET | Refills: 2 | Status: SHIPPED | OUTPATIENT
Start: 2025-06-05

## 2025-06-05 RX ORDER — NITROGLYCERIN 0.3 MG/1
0.3 TABLET SUBLINGUAL EVERY 5 MIN PRN
Qty: 25 TABLET | Refills: 0 | Status: SHIPPED | OUTPATIENT
Start: 2025-06-05

## 2025-06-05 RX ORDER — DAPAGLIFLOZIN 10 MG/1
10 TABLET, FILM COATED ORAL EVERY MORNING
Qty: 90 TABLET | Refills: 3 | Status: SHIPPED | OUTPATIENT
Start: 2025-06-05

## 2025-06-05 RX ORDER — METOPROLOL TARTRATE 25 MG/1
25 TABLET, FILM COATED ORAL 2 TIMES DAILY
Qty: 180 TABLET | Refills: 2 | Status: SHIPPED | OUTPATIENT
Start: 2025-06-05

## 2025-06-05 ASSESSMENT — PATIENT HEALTH QUESTIONNAIRE - PHQ9
2. FEELING DOWN, DEPRESSED OR HOPELESS: NOT AT ALL
SUM OF ALL RESPONSES TO PHQ QUESTIONS 1-9: 0
1. LITTLE INTEREST OR PLEASURE IN DOING THINGS: NOT AT ALL
SUM OF ALL RESPONSES TO PHQ QUESTIONS 1-9: 0

## 2025-06-05 ASSESSMENT — ENCOUNTER SYMPTOMS
GASTROINTESTINAL NEGATIVE: 1
RHINORRHEA: 0
SHORTNESS OF BREATH: 1
EYES NEGATIVE: 1
COUGH: 1

## 2025-06-05 NOTE — PROGRESS NOTES
1. Have you been to the ER, urgent care clinic since your last visit?  Hospitalized since your last visit?     No      2.  Where do you normally have your labs drawn?   [facility]    3. Do you need any refills today?   Yes    4. Which local pharmacy do you use (enter pharmacy)?   [facility]    5. Which mail order pharmacy do you use (enter pharmacy)?   No     6. Are you here for surgical clearance and if so who will be doing your     procedure/surgery (care team)?   No      
pain          See patient instructions also for other medical advice given    Medications Discontinued During This Encounter   Medication Reason    nitroGLYCERIN (NITROSTAT) 0.3 MG SL tablet REORDER    potassium chloride (KLOR-CON M) 20 MEQ extended release tablet REORDER    amLODIPine (NORVASC) 10 MG tablet REORDER    dapagliflozin (FARXIGA) 10 MG tablet REORDER    metoprolol tartrate (LOPRESSOR) 25 MG tablet REORDER    bumetanide (BUMEX) 1 MG tablet REORDER    Magnesium 400 MG TABS REORDER       Follow-up and Dispositions    Return in about 6 weeks (around 7/17/2025), or if symptoms worsen or fail to improve, for post test/procedure.           Return to ER if any significant CP not relieved by s/l NTG, severe SOB, severe palpitations, loss of consciousness    6/5/2025  Plan for medicines : Change Bumex to 1 mg a day.  Add spironolactone 25 mg a day.  Reduce Klor-Con to 10 mEq a day (he is taking 40 mEq a day now) reduce amlodipine to 2.5 mg a day to prevent hypotension.  Continue other current cardiac medications.  Will consider a stress test as CHF improves.  Discussed that he should go back to the hospital if shortness of breath worsens.  Exercise Plan: Try to walk as much as possible.  Diet plan: Mediterranean diet guidelines reinforced.

## 2025-06-16 ENCOUNTER — CARE COORDINATION (OUTPATIENT)
Facility: CLINIC | Age: 85
End: 2025-06-16

## 2025-06-16 NOTE — CARE COORDINATION
Ambulatory Care Coordination Note     2025 4:19 PM     Patient Current Location:  Home:  [facility] [address]     ACM contacted the patient by telephone. Verified name and  with patient as identifiers.         ACM: Asif Hernandez RN     Challenges to be reviewed by the provider   Additional needs identified to be addressed with provider No                 Method of communication with provider: phone.    Utilization: Patient has not had any utilization since our last call.     Care Summary Note:   Patient exhibiting an increase in dementia symptoms as reported by patients wife.  No notable change in Patient health status from last encounter. Follow up appointments listed below and questions from Patient / Care Giver answered.  Patient has ACM contact information.    Attention to medication at this encounter to any side effects and determination that patient has sufficient quantity of meds and/or refills. Shows understanding of medication therapy  Assessment done with attention to primary illness and / or condition.  Respiratory and cardiac status shows no issues at present    Offered patient enrollment in the Remote Patient Monitoring (RPM) program for in-home monitoring: Deferred at this time because dementia and some degree of confusion; will discuss at next outreach.     Assessments Completed:   No changes since last call    Medications Reviewed:   Patient denies any changes with medications and reports taking all medications as prescribed.    Advance Care Planning:   Not on file; education provided     Care Planning:    Goals Addressed                   This Visit's Progress     Attend follow up appointments on schedule   On track     Prepare patients and caregivers for end of life decisions (ie. need for hospice, pain management, symptom relief, advance directives etc.)   On track     Take all medications as ordered   On track             PCP/Specialist follow up:   Future Appointments

## 2025-06-30 ENCOUNTER — CARE COORDINATION (OUTPATIENT)
Facility: CLINIC | Age: 85
End: 2025-06-30

## 2025-06-30 NOTE — CARE COORDINATION
Ambulatory Care Coordination Note     6/30/2025 4:16 PM     Spouse/Partner outreach attempt by this ACM today to perform care management follow up . ACM was unable to reach the spouse/partner  by telephone today;   left voice message requesting a return phone call to this ACM.     ACM: Asif Hernandez RN     Care Summary Note: NA    PCP/Specialist follow up:   Future Appointments         Provider Specialty Dept Phone    7/14/2025 8:30 AM Natalie Bee APRN - NP Cardiology [phone]    9/29/2025 10:30 AM Felton Oneill MD Family Medicine [phone]            Follow Up:   Plan for next ACM outreach in approximately 1 week to complete:  - medication review  - advance care planning  - goal progression  - education   - RPM.

## 2025-07-07 ENCOUNTER — CARE COORDINATION (OUTPATIENT)
Facility: CLINIC | Age: 85
End: 2025-07-07

## 2025-07-07 NOTE — CARE COORDINATION
Ambulatory Care Coordination Note     7/7/2025 11:45 AM     Patient outreach attempt by this ACM today to perform care management follow up . ACM was unable to reach the patient by telephone today;   left voice message requesting a return phone call to this ACM.     ACM: Asif Hernandez RN     Care Summary Note: NA    PCP/Specialist follow up:   Future Appointments         Provider Specialty Dept Phone    7/14/2025 8:30 AM Natalie Bee APRN - NP Cardiology [phone]    9/29/2025 10:30 AM Felton Oneill MD Family Medicine [phone]            Follow Up:   Plan for next ACM outreach in approximately 1-2 days  to complete:  - education   - RPM.

## 2025-07-08 ENCOUNTER — CARE COORDINATION (OUTPATIENT)
Facility: CLINIC | Age: 85
End: 2025-07-08

## 2025-07-08 DIAGNOSIS — I50.20 SYSTOLIC CONGESTIVE HEART FAILURE, UNSPECIFIED HF CHRONICITY (HCC): Primary | ICD-10-CM

## 2025-07-08 NOTE — CARE COORDINATION
Ambulatory Care Coordination Note     7/8/2025 3:04 PM     patient outreach attempt by this ACM today to perform care management follow up . AC was unable to reach the patient by telephone today;   left voice message requesting a return phone call to this ACM.     Patient closed (patient disengaged) from the High Risk Care Management program on 7/8/2025.  Patient unable to progress towards self-management. .  Care management goals have been completed.       No further Ambulatory Care Manager follow up scheduled.

## 2025-07-08 NOTE — CARE COORDINATION
Patient Jose Corado   07/08/25     Care Coordination  placed call to patient to arrange RPM kit  through [employer]. Left HIPAA Compliant Message     provided return and how to pack equipment in original packing via the patients voicemail if available and provided call back number should patient have questions.    Patient made aware [employer] will  equipment in 2-4 days.

## 2025-07-09 ENCOUNTER — TELEPHONE (OUTPATIENT)
Age: 85
End: 2025-07-09

## 2025-07-09 DIAGNOSIS — I50.42 CHRONIC COMBINED SYSTOLIC AND DIASTOLIC CONGESTIVE HEART FAILURE (HCC): Primary | ICD-10-CM

## 2025-07-09 LAB
ANION GAP SERPL CALCULATED.3IONS-SCNC: 8 MMOL/L (ref 3–15)
BUN BLDV-MCNC: 37 MG/DL (ref 6–22)
CALCIUM SERPL-MCNC: 10.1 MG/DL (ref 8.4–10.5)
CHLORIDE BLD-SCNC: 100 MMOL/L (ref 98–110)
CO2: 38 MMOL/L (ref 20–32)
CREAT SERPL-MCNC: 1.6 MG/DL (ref 0.8–1.6)
GFR, ESTIMATED: 40.8 ML/MIN/1.73 SQ.M.
GLUCOSE: 106 MG/DL (ref 70–99)
MAGNESIUM: 1.9 MG/DL (ref 1.6–2.5)
POTASSIUM SERPL-SCNC: 2.5 MMOL/L (ref 3.5–5.5)
SODIUM BLD-SCNC: 146 MMOL/L (ref 133–145)

## 2025-07-09 NOTE — TELEPHONE ENCOUNTER
No more metolazone  Patient has potassium tablets of 10 mEq as per her list.  Tell her to take 40 mEq twice a day for 2 days and she can start right now.  If she does not feel good, she should go to emergency room.  Check if patient has any edema and let me know.  Repeat BMP in 2 days which should be done in the morning on a stat basis.

## 2025-07-09 NOTE — PROGRESS NOTES
Remote Patient Order Discontinued    Received request from Asif Hernandez RN   to discontinue order for remote patient monitoring of CHF and order completed.

## 2025-07-09 NOTE — TELEPHONE ENCOUNTER
Spoke to patient wife regarding lab/test per Dr. Posey. Lab reviewed.No more metolazone  Patient has potassium tablets of 10 mEq as per her list.  Tell her to take 40 mEq twice a day for 2 days and she can start right now.  If she does not feel good, she should go to emergency room.  Check if patient has any edema and let me know.  Repeat BMP in 2 days which should be done in the morning on a stat basis. He voices understanding and acceptance of this advice and will call back if any further questions or concerns.

## 2025-07-10 NOTE — PROGRESS NOTES
[facility] in [place]  Hector Tadeo comes in for follow-up care. He is accompanied by his wife. CHF: Patient has a history of chronic diastolic CHF. Currently has occasional shortness of breath and wheeze. He has been on metolazone 3 times a week and furosemide daily. Lower extremity swelling has increased. His weight has gone up by 5 pounds since I last saw him. This is CHF with exacerbation. We discussed supportive care measures. Patient has been taking the furosemide and given he continues to have the swelling may benefit from getting IV Lasix. Patient declines this at the moment. He does not feel short of breath. I will have him take the metolazone daily for the next 10 days. He will continue with the furosemide. He is due to see the cardiologist next week. In the meantime he will keep a weight log. We need to see his weight coming down. If it keeps increasing that he will have to go to the emergency room. CAD: Patient has coronary artery disease. Denies chest pain or diaphoresis. He has had CABG x4. He is on pravastatin, nitroglycerin as needed, apixaban. We will continue with the current treatment plan. Dyslipidemia: Has dyslipidemia. He is on pravastatin. He will take a diet low in polysaturated fats. Hypertension: Patient has hypertension. Blood pressure is stable. Denies headache, changes in vision or focal weakness. Patient is on amlodipine 10 mg daily. We will continue current treatment plan. Mood disorder: Patient has mood disorder with anxiety and depression. Patient takes sertraline. Stable on the medication. Continue current treatment plan. CKD stage IIIa: Patient has chronic kidney disease stage IIIa. Plan is to avoid any nephrotoxic medications. GERD: Patient has gastroesophageal reflux disease. He is on Protonix. We will continue with this medication. COPD: Patient has COPD. He is on inhaler medication.   He has been followed up by the pulmonologist.  Patient is on Sotalol refill sent yesterday by [facility].    Call #2, received voicemail for Milli Mejia, spouse on verbal comm. Left detailed message asking to call back re: Ryan's Sotalol and to schedule follow up visit.     Looks like spot on hold is 7/22 at 11:30 in [place]. (No device / AF on Sotalol / APC)

## 2025-07-11 ENCOUNTER — TELEPHONE (OUTPATIENT)
Facility: CLINIC | Age: 85
End: 2025-07-11

## 2025-07-11 NOTE — TELEPHONE ENCOUNTER
Noah from [facility] lab called stating that the patient has a critical Potassium of 2.5.  I spoke with Dr. Em and then the patient and he was advised to follow up at the E/D for evaluation of of the critical potssum.

## 2025-07-17 ENCOUNTER — OFFICE VISIT (OUTPATIENT)
Age: 85
End: 2025-07-17
Payer: MEDICARE

## 2025-07-17 VITALS — HEART RATE: 74 BPM | OXYGEN SATURATION: 94 % | DIASTOLIC BLOOD PRESSURE: 58 MMHG | SYSTOLIC BLOOD PRESSURE: 120 MMHG

## 2025-07-17 DIAGNOSIS — I50.42 CHRONIC COMBINED SYSTOLIC AND DIASTOLIC CONGESTIVE HEART FAILURE (HCC): ICD-10-CM

## 2025-07-17 DIAGNOSIS — I25.708 CORONARY ARTERY DISEASE INVOLVING CORONARY BYPASS GRAFT OF NATIVE HEART WITH OTHER FORMS OF ANGINA PECTORIS: ICD-10-CM

## 2025-07-17 DIAGNOSIS — I48.11 LONGSTANDING PERSISTENT ATRIAL FIBRILLATION (HCC): ICD-10-CM

## 2025-07-17 DIAGNOSIS — I10 ESSENTIAL HYPERTENSION: ICD-10-CM

## 2025-07-17 DIAGNOSIS — Z95.1 S/P CABG X 4: ICD-10-CM

## 2025-07-17 DIAGNOSIS — Z95.818 PRESENCE OF CARDIOMEMS HF SYSTEM: ICD-10-CM

## 2025-07-17 DIAGNOSIS — I25.10 CORONARY ARTERY DISEASE INVOLVING NATIVE HEART WITHOUT ANGINA PECTORIS, UNSPECIFIED VESSEL OR LESION TYPE: Primary | ICD-10-CM

## 2025-07-17 PROCEDURE — 99214 OFFICE O/P EST MOD 30 MIN: CPT | Performed by: NURSE PRACTITIONER

## 2025-07-17 PROCEDURE — 1125F AMNT PAIN NOTED PAIN PRSNT: CPT | Performed by: NURSE PRACTITIONER

## 2025-07-17 PROCEDURE — 1160F RVW MEDS BY RX/DR IN RCRD: CPT | Performed by: NURSE PRACTITIONER

## 2025-07-17 PROCEDURE — G8427 DOCREV CUR MEDS BY ELIG CLIN: HCPCS | Performed by: NURSE PRACTITIONER

## 2025-07-17 PROCEDURE — 93000 ELECTROCARDIOGRAM COMPLETE: CPT | Performed by: NURSE PRACTITIONER

## 2025-07-17 PROCEDURE — 1123F ACP DISCUSS/DSCN MKR DOCD: CPT | Performed by: NURSE PRACTITIONER

## 2025-07-17 PROCEDURE — G8417 CALC BMI ABV UP PARAM F/U: HCPCS | Performed by: NURSE PRACTITIONER

## 2025-07-17 PROCEDURE — 1159F MED LIST DOCD IN RCRD: CPT | Performed by: NURSE PRACTITIONER

## 2025-07-17 PROCEDURE — 1036F TOBACCO NON-USER: CPT | Performed by: NURSE PRACTITIONER

## 2025-07-17 RX ORDER — PANTOPRAZOLE SODIUM 40 MG/1
40 TABLET, DELAYED RELEASE ORAL DAILY
Qty: 90 TABLET | Refills: 0 | Status: SHIPPED | OUTPATIENT
Start: 2025-07-17

## 2025-07-17 RX ORDER — ALBUTEROL SULFATE 90 UG/1
1 INHALANT RESPIRATORY (INHALATION) EVERY 4 HOURS PRN
Qty: 18 G | Refills: 0 | Status: SHIPPED | OUTPATIENT
Start: 2025-07-17

## 2025-07-17 ASSESSMENT — PATIENT HEALTH QUESTIONNAIRE - PHQ9
2. FEELING DOWN, DEPRESSED OR HOPELESS: NOT AT ALL
SUM OF ALL RESPONSES TO PHQ QUESTIONS 1-9: 0
1. LITTLE INTEREST OR PLEASURE IN DOING THINGS: NOT AT ALL

## 2025-07-17 ASSESSMENT — ENCOUNTER SYMPTOMS
GASTROINTESTINAL NEGATIVE: 1
RHINORRHEA: 0
SHORTNESS OF BREATH: 1
EYES NEGATIVE: 1

## 2025-07-17 NOTE — PROGRESS NOTES
1. Have you been to the ER, urgent care clinic since your last visit?  Hospitalized since your last visit?[facility] potassium low     2. Have you seen or consulted any other health care providers outside of the [facility] since your last visit?  Include any pap smears or colon screening. No     Patient has not had medications this morning  
a visually estimated EF of 50 - 55%. Left ventricle size is normal. Increased wall thickness. Findings consistent with severe concentric hypertrophy.  Unable to perform strain due to irregular heart rhythym. Normal wall motion.    Right Ventricle: Reduced systolic function.    Mitral Valve: Mild regurgitation.    Left Atrium: Left atrium is moderately dilated. LA Vol Index A/L is 48 mL/m2.    Right Atrium: Right atrium is dilated.    Pulmonary Arteries: Mild pulmonary hypertension present. The estimated PASP is 44 mmHg.    Aorta: Mildly dilated aortic root. Ao root diameter is 3.8 cm.   Comparison Study Information  Prior Study  There is a prior study available for comparison. Prior study date: 7/20/2022. As compared to the previous study, there are significant changes. Pulmonary hypertension has decreased.      Nuclear stress test  8/2023  Interpretation Summary    Stress Combined Conclusion: Findings suggest a moderate risk of cardiac events.    Stress Function: Left ventricular function post-stress is abnormal. Post-stress ejection fraction is 28%. The stress end diastolic cavity size is normal. Stress end diastolic index: 128.0 mL. The stress end systolic cavity size is severely enlarged. Stress end systolic index: 93.0 mL    Perfusion Comments: Prone images were not obtained. LV perfusion is probably abnormal. There is no evidence of inducible ischemia.    Perfusion Defect: There is a mild severity left ventricular stress perfusion defect that is small in size present in the basal to mid inferior segment(s) that is fixed. Viability in the area is poor. There is abnormal wall motion in the defect area. The defect appears to be probable infarction.    Perfusion Conclusion: There is no evidence of transient ischemic dilation (TID).    ECG: Resting ECG demonstrates atrial fibrillation.    ECG: The ECG was negative for ischemia.    Stress Test: A pharmacological stress test was performed using lexiscan. The patient

## 2025-07-18 ASSESSMENT — ENCOUNTER SYMPTOMS: COUGH: 0
